# Patient Record
Sex: MALE | Race: BLACK OR AFRICAN AMERICAN | Employment: OTHER | URBAN - METROPOLITAN AREA
[De-identification: names, ages, dates, MRNs, and addresses within clinical notes are randomized per-mention and may not be internally consistent; named-entity substitution may affect disease eponyms.]

---

## 2017-01-16 ENCOUNTER — OFFICE VISIT (OUTPATIENT)
Dept: ORTHOPEDIC SURGERY | Age: 82
End: 2017-01-16

## 2017-01-16 VITALS
HEART RATE: 83 BPM | SYSTOLIC BLOOD PRESSURE: 138 MMHG | HEIGHT: 74 IN | WEIGHT: 174 LBS | BODY MASS INDEX: 22.33 KG/M2 | DIASTOLIC BLOOD PRESSURE: 73 MMHG

## 2017-01-16 DIAGNOSIS — M17.12 PRIMARY OSTEOARTHRITIS OF LEFT KNEE: ICD-10-CM

## 2017-01-16 DIAGNOSIS — S80.02XD CONTUSION OF LEFT KNEE, SUBSEQUENT ENCOUNTER: Primary | ICD-10-CM

## 2017-01-16 PROCEDURE — 20610 DRAIN/INJ JOINT/BURSA W/O US: CPT | Performed by: ORTHOPAEDIC SURGERY

## 2017-01-16 PROCEDURE — G8427 DOCREV CUR MEDS BY ELIG CLIN: HCPCS | Performed by: ORTHOPAEDIC SURGERY

## 2017-01-16 PROCEDURE — G8419 CALC BMI OUT NRM PARAM NOF/U: HCPCS | Performed by: ORTHOPAEDIC SURGERY

## 2017-01-16 PROCEDURE — G8484 FLU IMMUNIZE NO ADMIN: HCPCS | Performed by: ORTHOPAEDIC SURGERY

## 2017-01-16 PROCEDURE — 99213 OFFICE O/P EST LOW 20 MIN: CPT | Performed by: ORTHOPAEDIC SURGERY

## 2017-01-16 PROCEDURE — G8598 ASA/ANTIPLAT THER USED: HCPCS | Performed by: ORTHOPAEDIC SURGERY

## 2017-01-16 PROCEDURE — 1036F TOBACCO NON-USER: CPT | Performed by: ORTHOPAEDIC SURGERY

## 2017-01-16 PROCEDURE — 4040F PNEUMOC VAC/ADMIN/RCVD: CPT | Performed by: ORTHOPAEDIC SURGERY

## 2017-01-16 PROCEDURE — 1123F ACP DISCUSS/DSCN MKR DOCD: CPT | Performed by: ORTHOPAEDIC SURGERY

## 2017-01-16 RX ORDER — CYCLOBENZAPRINE HCL 10 MG
10 TABLET ORAL NIGHTLY PRN
Qty: 30 TABLET | Refills: 0 | Status: SHIPPED | OUTPATIENT
Start: 2017-01-16 | End: 2019-02-28

## 2017-02-08 ENCOUNTER — HOSPITAL ENCOUNTER (OUTPATIENT)
Dept: PHYSICAL THERAPY | Facility: MEDICAL CENTER | Age: 82
Discharge: HOME | End: 2017-02-28
Admitting: ORTHOPAEDIC SURGERY

## 2017-02-14 ENCOUNTER — OFFICE VISIT (OUTPATIENT)
Dept: INTERNAL MEDICINE CLINIC | Age: 82
End: 2017-02-14

## 2017-02-14 VITALS
HEART RATE: 74 BPM | SYSTOLIC BLOOD PRESSURE: 130 MMHG | OXYGEN SATURATION: 98 % | WEIGHT: 182 LBS | DIASTOLIC BLOOD PRESSURE: 82 MMHG | BODY MASS INDEX: 23.37 KG/M2

## 2017-02-14 DIAGNOSIS — H91.93 HEARING DEFICIT, BILATERAL: ICD-10-CM

## 2017-02-14 DIAGNOSIS — R68.89 FORGETFULNESS: ICD-10-CM

## 2017-02-14 DIAGNOSIS — E78.00 PURE HYPERCHOLESTEROLEMIA: ICD-10-CM

## 2017-02-14 DIAGNOSIS — I10 ESSENTIAL HYPERTENSION: ICD-10-CM

## 2017-02-14 PROCEDURE — G8427 DOCREV CUR MEDS BY ELIG CLIN: HCPCS | Performed by: INTERNAL MEDICINE

## 2017-02-14 PROCEDURE — G8598 ASA/ANTIPLAT THER USED: HCPCS | Performed by: INTERNAL MEDICINE

## 2017-02-14 PROCEDURE — 1036F TOBACCO NON-USER: CPT | Performed by: INTERNAL MEDICINE

## 2017-02-14 PROCEDURE — G8484 FLU IMMUNIZE NO ADMIN: HCPCS | Performed by: INTERNAL MEDICINE

## 2017-02-14 PROCEDURE — 4040F PNEUMOC VAC/ADMIN/RCVD: CPT | Performed by: INTERNAL MEDICINE

## 2017-02-14 PROCEDURE — 99214 OFFICE O/P EST MOD 30 MIN: CPT | Performed by: INTERNAL MEDICINE

## 2017-02-14 PROCEDURE — 1123F ACP DISCUSS/DSCN MKR DOCD: CPT | Performed by: INTERNAL MEDICINE

## 2017-02-14 PROCEDURE — G8420 CALC BMI NORM PARAMETERS: HCPCS | Performed by: INTERNAL MEDICINE

## 2017-02-14 RX ORDER — ATORVASTATIN CALCIUM 40 MG/1
40 TABLET, FILM COATED ORAL DAILY
Qty: 30 TABLET | Refills: 5 | Status: SHIPPED | OUTPATIENT
Start: 2017-02-14 | End: 2018-06-27 | Stop reason: SDUPTHER

## 2017-02-14 RX ORDER — METOPROLOL SUCCINATE 25 MG/1
25 TABLET, EXTENDED RELEASE ORAL DAILY
Qty: 30 TABLET | Refills: 5 | Status: SHIPPED | OUTPATIENT
Start: 2017-02-14 | End: 2018-02-06 | Stop reason: SDUPTHER

## 2017-02-14 RX ORDER — LISINOPRIL 10 MG/1
TABLET ORAL
Qty: 30 TABLET | Refills: 5 | Status: SHIPPED | OUTPATIENT
Start: 2017-02-14 | End: 2017-04-28 | Stop reason: SDUPTHER

## 2017-02-14 ASSESSMENT — PATIENT HEALTH QUESTIONNAIRE - PHQ9
1. LITTLE INTEREST OR PLEASURE IN DOING THINGS: 1
SUM OF ALL RESPONSES TO PHQ9 QUESTIONS 1 & 2: 1
SUM OF ALL RESPONSES TO PHQ QUESTIONS 1-9: 1
2. FEELING DOWN, DEPRESSED OR HOPELESS: 0

## 2017-02-19 ASSESSMENT — ENCOUNTER SYMPTOMS
EYES NEGATIVE: 1
GASTROINTESTINAL NEGATIVE: 1
RESPIRATORY NEGATIVE: 1

## 2017-04-07 ENCOUNTER — OFFICE VISIT (OUTPATIENT)
Dept: ORTHOPEDIC SURGERY | Age: 82
End: 2017-04-07

## 2017-04-07 VITALS
DIASTOLIC BLOOD PRESSURE: 90 MMHG | HEART RATE: 56 BPM | SYSTOLIC BLOOD PRESSURE: 175 MMHG | HEIGHT: 74 IN | WEIGHT: 174 LBS | RESPIRATION RATE: 16 BRPM | BODY MASS INDEX: 22.33 KG/M2

## 2017-04-07 DIAGNOSIS — M17.12 PRIMARY OSTEOARTHRITIS OF LEFT KNEE: Primary | ICD-10-CM

## 2017-04-07 DIAGNOSIS — M25.662 JOINT STIFFNESS OF LEFT LOWER LEG: ICD-10-CM

## 2017-04-07 PROCEDURE — 1036F TOBACCO NON-USER: CPT | Performed by: ORTHOPAEDIC SURGERY

## 2017-04-07 PROCEDURE — G8598 ASA/ANTIPLAT THER USED: HCPCS | Performed by: ORTHOPAEDIC SURGERY

## 2017-04-07 PROCEDURE — 99213 OFFICE O/P EST LOW 20 MIN: CPT | Performed by: ORTHOPAEDIC SURGERY

## 2017-04-07 PROCEDURE — G8419 CALC BMI OUT NRM PARAM NOF/U: HCPCS | Performed by: ORTHOPAEDIC SURGERY

## 2017-04-07 PROCEDURE — G8427 DOCREV CUR MEDS BY ELIG CLIN: HCPCS | Performed by: ORTHOPAEDIC SURGERY

## 2017-04-07 PROCEDURE — 4040F PNEUMOC VAC/ADMIN/RCVD: CPT | Performed by: ORTHOPAEDIC SURGERY

## 2017-04-07 PROCEDURE — 1123F ACP DISCUSS/DSCN MKR DOCD: CPT | Performed by: ORTHOPAEDIC SURGERY

## 2017-04-28 ENCOUNTER — OFFICE VISIT (OUTPATIENT)
Dept: ORTHOPEDIC SURGERY | Age: 82
End: 2017-04-28

## 2017-04-28 VITALS
HEIGHT: 74 IN | BODY MASS INDEX: 22.33 KG/M2 | DIASTOLIC BLOOD PRESSURE: 70 MMHG | HEART RATE: 70 BPM | RESPIRATION RATE: 16 BRPM | SYSTOLIC BLOOD PRESSURE: 154 MMHG | WEIGHT: 174 LBS

## 2017-04-28 DIAGNOSIS — I10 ESSENTIAL HYPERTENSION: ICD-10-CM

## 2017-04-28 DIAGNOSIS — M19.041 DEGENERATIVE ARTHRITIS OF FINGER, RIGHT: Primary | ICD-10-CM

## 2017-04-28 PROCEDURE — 99214 OFFICE O/P EST MOD 30 MIN: CPT | Performed by: ORTHOPAEDIC SURGERY

## 2017-04-28 PROCEDURE — 4040F PNEUMOC VAC/ADMIN/RCVD: CPT | Performed by: ORTHOPAEDIC SURGERY

## 2017-04-28 PROCEDURE — G8598 ASA/ANTIPLAT THER USED: HCPCS | Performed by: ORTHOPAEDIC SURGERY

## 2017-04-28 PROCEDURE — G8419 CALC BMI OUT NRM PARAM NOF/U: HCPCS | Performed by: ORTHOPAEDIC SURGERY

## 2017-04-28 PROCEDURE — 1036F TOBACCO NON-USER: CPT | Performed by: ORTHOPAEDIC SURGERY

## 2017-04-28 PROCEDURE — 20600 DRAIN/INJ JOINT/BURSA W/O US: CPT | Performed by: ORTHOPAEDIC SURGERY

## 2017-04-28 PROCEDURE — G8427 DOCREV CUR MEDS BY ELIG CLIN: HCPCS | Performed by: ORTHOPAEDIC SURGERY

## 2017-04-28 RX ORDER — LISINOPRIL 10 MG/1
TABLET ORAL
Qty: 90 TABLET | Refills: 3 | Status: SHIPPED | OUTPATIENT
Start: 2017-04-28 | End: 2018-06-27 | Stop reason: SDUPTHER

## 2017-05-15 ENCOUNTER — OFFICE VISIT (OUTPATIENT)
Dept: INTERNAL MEDICINE CLINIC | Age: 82
End: 2017-05-15

## 2017-05-15 VITALS
HEART RATE: 75 BPM | SYSTOLIC BLOOD PRESSURE: 110 MMHG | OXYGEN SATURATION: 98 % | BODY MASS INDEX: 22.21 KG/M2 | WEIGHT: 173 LBS | DIASTOLIC BLOOD PRESSURE: 70 MMHG

## 2017-05-15 DIAGNOSIS — Z23 NEED FOR PROPHYLACTIC VACCINATION AGAINST DIPHTHERIA-TETANUS-PERTUSSIS (DTP): ICD-10-CM

## 2017-05-15 DIAGNOSIS — R53.83 OTHER FATIGUE: ICD-10-CM

## 2017-05-15 DIAGNOSIS — I25.10 CAD IN NATIVE ARTERY: ICD-10-CM

## 2017-05-15 DIAGNOSIS — Z23 NEED FOR PROPHYLACTIC VACCINATION AGAINST STREPTOCOCCUS PNEUMONIAE (PNEUMOCOCCUS): ICD-10-CM

## 2017-05-15 DIAGNOSIS — E78.2 MIXED HYPERLIPIDEMIA: ICD-10-CM

## 2017-05-15 DIAGNOSIS — I10 ESSENTIAL HYPERTENSION: ICD-10-CM

## 2017-05-15 LAB
ANION GAP SERPL CALCULATED.3IONS-SCNC: 14 MMOL/L (ref 3–16)
BUN BLDV-MCNC: 11 MG/DL (ref 7–20)
CALCIUM SERPL-MCNC: 9.8 MG/DL (ref 8.3–10.6)
CHLORIDE BLD-SCNC: 102 MMOL/L (ref 99–110)
CHOLESTEROL, TOTAL: 160 MG/DL (ref 0–199)
CO2: 27 MMOL/L (ref 21–32)
CREAT SERPL-MCNC: 0.8 MG/DL (ref 0.8–1.3)
CREATININE URINE: 96.1 MG/DL (ref 39–259)
GFR AFRICAN AMERICAN: >60
GFR NON-AFRICAN AMERICAN: >60
GLUCOSE BLD-MCNC: 63 MG/DL (ref 70–99)
HDLC SERPL-MCNC: 64 MG/DL (ref 40–60)
LDL CHOLESTEROL CALCULATED: 85 MG/DL
MICROALBUMIN UR-MCNC: <1.2 MG/DL
MICROALBUMIN/CREAT UR-RTO: NORMAL MG/G (ref 0–30)
POTASSIUM SERPL-SCNC: 5.4 MMOL/L (ref 3.5–5.1)
SODIUM BLD-SCNC: 143 MMOL/L (ref 136–145)
TRIGL SERPL-MCNC: 54 MG/DL (ref 0–150)
TSH REFLEX: 0.89 UIU/ML (ref 0.27–4.2)
VLDLC SERPL CALC-MCNC: 11 MG/DL

## 2017-05-15 PROCEDURE — G8427 DOCREV CUR MEDS BY ELIG CLIN: HCPCS | Performed by: INTERNAL MEDICINE

## 2017-05-15 PROCEDURE — 99214 OFFICE O/P EST MOD 30 MIN: CPT | Performed by: INTERNAL MEDICINE

## 2017-05-15 PROCEDURE — 90670 PCV13 VACCINE IM: CPT | Performed by: INTERNAL MEDICINE

## 2017-05-15 PROCEDURE — G8419 CALC BMI OUT NRM PARAM NOF/U: HCPCS | Performed by: INTERNAL MEDICINE

## 2017-05-15 PROCEDURE — 1036F TOBACCO NON-USER: CPT | Performed by: INTERNAL MEDICINE

## 2017-05-15 PROCEDURE — G0009 ADMIN PNEUMOCOCCAL VACCINE: HCPCS | Performed by: INTERNAL MEDICINE

## 2017-05-15 PROCEDURE — G8598 ASA/ANTIPLAT THER USED: HCPCS | Performed by: INTERNAL MEDICINE

## 2017-05-15 PROCEDURE — 4040F PNEUMOC VAC/ADMIN/RCVD: CPT | Performed by: INTERNAL MEDICINE

## 2017-05-15 PROCEDURE — 1123F ACP DISCUSS/DSCN MKR DOCD: CPT | Performed by: INTERNAL MEDICINE

## 2017-05-15 RX ORDER — CLOTRIMAZOLE AND BETAMETHASONE DIPROPIONATE 10; .64 MG/G; MG/G
CREAM TOPICAL
Qty: 15 G | Refills: 0 | Status: SHIPPED | OUTPATIENT
Start: 2017-05-15 | End: 2017-08-15 | Stop reason: SDUPTHER

## 2017-05-21 ASSESSMENT — ENCOUNTER SYMPTOMS
GASTROINTESTINAL NEGATIVE: 1
EYES NEGATIVE: 1
RESPIRATORY NEGATIVE: 1

## 2017-07-07 ENCOUNTER — TELEPHONE (OUTPATIENT)
Dept: ORTHOPEDIC SURGERY | Age: 82
End: 2017-07-07

## 2017-07-07 ENCOUNTER — OFFICE VISIT (OUTPATIENT)
Dept: ORTHOPEDIC SURGERY | Age: 82
End: 2017-07-07

## 2017-07-07 VITALS
DIASTOLIC BLOOD PRESSURE: 80 MMHG | HEIGHT: 74 IN | SYSTOLIC BLOOD PRESSURE: 154 MMHG | HEART RATE: 75 BPM | WEIGHT: 173 LBS | BODY MASS INDEX: 22.2 KG/M2

## 2017-07-07 DIAGNOSIS — M17.12 PRIMARY OSTEOARTHRITIS OF LEFT KNEE: ICD-10-CM

## 2017-07-07 DIAGNOSIS — R29.898 LEFT LEG WEAKNESS: Primary | ICD-10-CM

## 2017-07-07 DIAGNOSIS — R26.9 GAIT ABNORMALITY: ICD-10-CM

## 2017-07-07 PROCEDURE — 99213 OFFICE O/P EST LOW 20 MIN: CPT | Performed by: ORTHOPAEDIC SURGERY

## 2017-07-07 PROCEDURE — G8598 ASA/ANTIPLAT THER USED: HCPCS | Performed by: ORTHOPAEDIC SURGERY

## 2017-07-07 PROCEDURE — 1036F TOBACCO NON-USER: CPT | Performed by: ORTHOPAEDIC SURGERY

## 2017-07-07 PROCEDURE — 1123F ACP DISCUSS/DSCN MKR DOCD: CPT | Performed by: ORTHOPAEDIC SURGERY

## 2017-07-07 PROCEDURE — G8420 CALC BMI NORM PARAMETERS: HCPCS | Performed by: ORTHOPAEDIC SURGERY

## 2017-07-07 PROCEDURE — 4040F PNEUMOC VAC/ADMIN/RCVD: CPT | Performed by: ORTHOPAEDIC SURGERY

## 2017-07-07 PROCEDURE — G8427 DOCREV CUR MEDS BY ELIG CLIN: HCPCS | Performed by: ORTHOPAEDIC SURGERY

## 2017-08-15 ENCOUNTER — OFFICE VISIT (OUTPATIENT)
Dept: INTERNAL MEDICINE CLINIC | Age: 82
End: 2017-08-15

## 2017-08-15 VITALS
SYSTOLIC BLOOD PRESSURE: 126 MMHG | WEIGHT: 170 LBS | BODY MASS INDEX: 21.82 KG/M2 | HEART RATE: 74 BPM | OXYGEN SATURATION: 98 % | HEIGHT: 74 IN | DIASTOLIC BLOOD PRESSURE: 80 MMHG

## 2017-08-15 DIAGNOSIS — E78.2 MIXED HYPERLIPIDEMIA: ICD-10-CM

## 2017-08-15 DIAGNOSIS — R53.83 FATIGUE, UNSPECIFIED TYPE: ICD-10-CM

## 2017-08-15 DIAGNOSIS — I25.10 CAD IN NATIVE ARTERY: ICD-10-CM

## 2017-08-15 DIAGNOSIS — I10 ESSENTIAL HYPERTENSION: ICD-10-CM

## 2017-08-15 PROCEDURE — G8427 DOCREV CUR MEDS BY ELIG CLIN: HCPCS | Performed by: INTERNAL MEDICINE

## 2017-08-15 PROCEDURE — 1036F TOBACCO NON-USER: CPT | Performed by: INTERNAL MEDICINE

## 2017-08-15 PROCEDURE — 1123F ACP DISCUSS/DSCN MKR DOCD: CPT | Performed by: INTERNAL MEDICINE

## 2017-08-15 PROCEDURE — 4040F PNEUMOC VAC/ADMIN/RCVD: CPT | Performed by: INTERNAL MEDICINE

## 2017-08-15 PROCEDURE — 99214 OFFICE O/P EST MOD 30 MIN: CPT | Performed by: INTERNAL MEDICINE

## 2017-08-15 PROCEDURE — G8598 ASA/ANTIPLAT THER USED: HCPCS | Performed by: INTERNAL MEDICINE

## 2017-08-15 PROCEDURE — G8420 CALC BMI NORM PARAMETERS: HCPCS | Performed by: INTERNAL MEDICINE

## 2017-08-15 RX ORDER — CLOTRIMAZOLE AND BETAMETHASONE DIPROPIONATE 10; .64 MG/G; MG/G
CREAM TOPICAL
Qty: 15 G | Refills: 0 | Status: SHIPPED | OUTPATIENT
Start: 2017-08-15 | End: 2019-02-28

## 2017-08-20 ASSESSMENT — ENCOUNTER SYMPTOMS
RESPIRATORY NEGATIVE: 1
EYES NEGATIVE: 1
GASTROINTESTINAL NEGATIVE: 1

## 2017-08-28 ENCOUNTER — TELEPHONE (OUTPATIENT)
Dept: ORTHOPEDIC SURGERY | Age: 82
End: 2017-08-28

## 2017-08-28 ENCOUNTER — OFFICE VISIT (OUTPATIENT)
Dept: ORTHOPEDIC SURGERY | Age: 82
End: 2017-08-28

## 2017-08-28 VITALS
BODY MASS INDEX: 21.82 KG/M2 | WEIGHT: 170 LBS | HEIGHT: 74 IN | HEART RATE: 72 BPM | DIASTOLIC BLOOD PRESSURE: 79 MMHG | SYSTOLIC BLOOD PRESSURE: 171 MMHG

## 2017-08-28 DIAGNOSIS — M48.061 LUMBAR STENOSIS: Primary | ICD-10-CM

## 2017-08-28 DIAGNOSIS — R26.81 GAIT INSTABILITY: ICD-10-CM

## 2017-08-28 DIAGNOSIS — R29.898 WEAKNESS OF LOWER EXTREMITY, UNSPECIFIED LATERALITY: ICD-10-CM

## 2017-08-28 PROCEDURE — G8420 CALC BMI NORM PARAMETERS: HCPCS | Performed by: ORTHOPAEDIC SURGERY

## 2017-08-28 PROCEDURE — 4040F PNEUMOC VAC/ADMIN/RCVD: CPT | Performed by: ORTHOPAEDIC SURGERY

## 2017-08-28 PROCEDURE — G8598 ASA/ANTIPLAT THER USED: HCPCS | Performed by: ORTHOPAEDIC SURGERY

## 2017-08-28 PROCEDURE — 99213 OFFICE O/P EST LOW 20 MIN: CPT | Performed by: ORTHOPAEDIC SURGERY

## 2017-08-28 PROCEDURE — 1036F TOBACCO NON-USER: CPT | Performed by: ORTHOPAEDIC SURGERY

## 2017-08-28 PROCEDURE — 1123F ACP DISCUSS/DSCN MKR DOCD: CPT | Performed by: ORTHOPAEDIC SURGERY

## 2017-08-28 PROCEDURE — G8427 DOCREV CUR MEDS BY ELIG CLIN: HCPCS | Performed by: ORTHOPAEDIC SURGERY

## 2017-09-21 ENCOUNTER — OFFICE VISIT (OUTPATIENT)
Dept: CARDIOLOGY CLINIC | Age: 82
End: 2017-09-21

## 2017-09-21 VITALS
BODY MASS INDEX: 22.34 KG/M2 | SYSTOLIC BLOOD PRESSURE: 132 MMHG | HEART RATE: 63 BPM | WEIGHT: 174 LBS | DIASTOLIC BLOOD PRESSURE: 76 MMHG

## 2017-09-21 DIAGNOSIS — I25.119 CORONARY ARTERY DISEASE INVOLVING NATIVE CORONARY ARTERY OF NATIVE HEART WITH ANGINA PECTORIS (HCC): Primary | ICD-10-CM

## 2017-09-21 DIAGNOSIS — I10 ESSENTIAL HYPERTENSION: ICD-10-CM

## 2017-09-21 PROCEDURE — 99214 OFFICE O/P EST MOD 30 MIN: CPT | Performed by: INTERNAL MEDICINE

## 2017-09-21 PROCEDURE — G8420 CALC BMI NORM PARAMETERS: HCPCS | Performed by: INTERNAL MEDICINE

## 2017-09-21 PROCEDURE — 1036F TOBACCO NON-USER: CPT | Performed by: INTERNAL MEDICINE

## 2017-09-21 PROCEDURE — G8427 DOCREV CUR MEDS BY ELIG CLIN: HCPCS | Performed by: INTERNAL MEDICINE

## 2017-09-21 PROCEDURE — 1123F ACP DISCUSS/DSCN MKR DOCD: CPT | Performed by: INTERNAL MEDICINE

## 2017-09-21 PROCEDURE — G8598 ASA/ANTIPLAT THER USED: HCPCS | Performed by: INTERNAL MEDICINE

## 2017-09-21 PROCEDURE — 4040F PNEUMOC VAC/ADMIN/RCVD: CPT | Performed by: INTERNAL MEDICINE

## 2017-10-12 ENCOUNTER — TELEPHONE (OUTPATIENT)
Dept: ORTHOPEDIC SURGERY | Age: 82
End: 2017-10-12

## 2017-11-30 ENCOUNTER — OFFICE VISIT (OUTPATIENT)
Dept: INTERNAL MEDICINE CLINIC | Age: 82
End: 2017-11-30

## 2017-11-30 VITALS
HEART RATE: 66 BPM | WEIGHT: 174 LBS | OXYGEN SATURATION: 97 % | SYSTOLIC BLOOD PRESSURE: 138 MMHG | RESPIRATION RATE: 16 BRPM | DIASTOLIC BLOOD PRESSURE: 88 MMHG | HEIGHT: 74 IN | BODY MASS INDEX: 22.33 KG/M2

## 2017-11-30 DIAGNOSIS — I10 ESSENTIAL HYPERTENSION: ICD-10-CM

## 2017-11-30 DIAGNOSIS — E78.2 MIXED HYPERLIPIDEMIA: ICD-10-CM

## 2017-11-30 DIAGNOSIS — Z23 FLU VACCINE NEED: ICD-10-CM

## 2017-11-30 DIAGNOSIS — N52.01 ERECTILE DYSFUNCTION DUE TO ARTERIAL INSUFFICIENCY: ICD-10-CM

## 2017-11-30 PROCEDURE — G8427 DOCREV CUR MEDS BY ELIG CLIN: HCPCS | Performed by: INTERNAL MEDICINE

## 2017-11-30 PROCEDURE — 4040F PNEUMOC VAC/ADMIN/RCVD: CPT | Performed by: INTERNAL MEDICINE

## 2017-11-30 PROCEDURE — G8420 CALC BMI NORM PARAMETERS: HCPCS | Performed by: INTERNAL MEDICINE

## 2017-11-30 PROCEDURE — 1036F TOBACCO NON-USER: CPT | Performed by: INTERNAL MEDICINE

## 2017-11-30 PROCEDURE — 99214 OFFICE O/P EST MOD 30 MIN: CPT | Performed by: INTERNAL MEDICINE

## 2017-11-30 PROCEDURE — G8598 ASA/ANTIPLAT THER USED: HCPCS | Performed by: INTERNAL MEDICINE

## 2017-11-30 PROCEDURE — 90662 IIV NO PRSV INCREASED AG IM: CPT | Performed by: INTERNAL MEDICINE

## 2017-11-30 PROCEDURE — 1123F ACP DISCUSS/DSCN MKR DOCD: CPT | Performed by: INTERNAL MEDICINE

## 2017-11-30 PROCEDURE — G0008 ADMIN INFLUENZA VIRUS VAC: HCPCS | Performed by: INTERNAL MEDICINE

## 2017-11-30 PROCEDURE — G8484 FLU IMMUNIZE NO ADMIN: HCPCS | Performed by: INTERNAL MEDICINE

## 2017-11-30 RX ORDER — METOPROLOL SUCCINATE 25 MG/1
TABLET, EXTENDED RELEASE ORAL
Refills: 5 | COMMUNITY
Start: 2017-10-30 | End: 2018-04-30 | Stop reason: ALTCHOICE

## 2017-11-30 RX ORDER — SILDENAFIL 100 MG/1
100 TABLET, FILM COATED ORAL PRN
Qty: 6 TABLET | Refills: 5 | Status: SHIPPED | OUTPATIENT
Start: 2017-11-30 | End: 2018-07-11 | Stop reason: SDUPTHER

## 2017-12-01 ASSESSMENT — ENCOUNTER SYMPTOMS
RESPIRATORY NEGATIVE: 1
EYES NEGATIVE: 1
GASTROINTESTINAL NEGATIVE: 1

## 2017-12-01 NOTE — PROGRESS NOTES
Heart healthy diet and statin compliance discussed. 3. Erectile dysfunction due to arterial insufficiency  sildenafil (VIAGRA) 100 MG tablet  Diet, proper rest, regular physical activity discussed. 4. Flu vaccine need  INFLUENZA, HIGH DOSE, 65 YRS +, IM, PF, PREFILL SYR, 0.5ML (FLUZONE HD)     Ryder Bailey received counseling on the following healthy behaviors: diet, physical activity and proper rest.    Patient given educational materials on Nutrition and Exercise    I have instructed Ryder Bailey to complete a self tracking handout on Blood Pressures  and instructed them to bring it with them to his next appointment. Discussed use, benefit, and side effects of prescribed medications. Barriers to medication compliance addressed. All patient questions answered. Pt voiced understanding. Plan:    See plans above.

## 2018-04-24 ENCOUNTER — OFFICE VISIT (OUTPATIENT)
Dept: INTERNAL MEDICINE CLINIC | Age: 83
End: 2018-04-24

## 2018-04-24 VITALS
DIASTOLIC BLOOD PRESSURE: 70 MMHG | BODY MASS INDEX: 22.97 KG/M2 | WEIGHT: 179 LBS | HEART RATE: 82 BPM | OXYGEN SATURATION: 98 % | HEIGHT: 74 IN | SYSTOLIC BLOOD PRESSURE: 126 MMHG

## 2018-04-24 DIAGNOSIS — E78.2 MIXED HYPERLIPIDEMIA: ICD-10-CM

## 2018-04-24 DIAGNOSIS — S89.92XD INJURY OF LEFT KNEE, SUBSEQUENT ENCOUNTER: ICD-10-CM

## 2018-04-24 DIAGNOSIS — G31.84 MCI (MILD COGNITIVE IMPAIRMENT) WITH MEMORY LOSS: ICD-10-CM

## 2018-04-24 DIAGNOSIS — I10 ESSENTIAL HYPERTENSION: Primary | ICD-10-CM

## 2018-04-24 LAB
A/G RATIO: 1.4 (ref 1.1–2.2)
ALBUMIN SERPL-MCNC: 4.4 G/DL (ref 3.4–5)
ALP BLD-CCNC: 68 U/L (ref 40–129)
ALT SERPL-CCNC: 16 U/L (ref 10–40)
ANION GAP SERPL CALCULATED.3IONS-SCNC: 14 MMOL/L (ref 3–16)
AST SERPL-CCNC: 20 U/L (ref 15–37)
BILIRUB SERPL-MCNC: 0.3 MG/DL (ref 0–1)
BUN BLDV-MCNC: 14 MG/DL (ref 7–20)
CALCIUM SERPL-MCNC: 9.7 MG/DL (ref 8.3–10.6)
CHLORIDE BLD-SCNC: 101 MMOL/L (ref 99–110)
CHOLESTEROL, TOTAL: 175 MG/DL (ref 0–199)
CO2: 29 MMOL/L (ref 21–32)
CREAT SERPL-MCNC: 0.7 MG/DL (ref 0.8–1.3)
FOLATE: >20 NG/ML (ref 4.78–24.2)
GFR AFRICAN AMERICAN: >60
GFR NON-AFRICAN AMERICAN: >60
GLOBULIN: 3.1 G/DL
GLUCOSE BLD-MCNC: 74 MG/DL (ref 70–99)
HCT VFR BLD CALC: 43.9 % (ref 40.5–52.5)
HDLC SERPL-MCNC: 64 MG/DL (ref 40–60)
HEMOGLOBIN: 14.5 G/DL (ref 13.5–17.5)
LDL CHOLESTEROL CALCULATED: 99 MG/DL
MCH RBC QN AUTO: 28.8 PG (ref 26–34)
MCHC RBC AUTO-ENTMCNC: 33 G/DL (ref 31–36)
MCV RBC AUTO: 87.3 FL (ref 80–100)
PDW BLD-RTO: 13.1 % (ref 12.4–15.4)
PLATELET # BLD: 195 K/UL (ref 135–450)
PMV BLD AUTO: 9.8 FL (ref 5–10.5)
POTASSIUM SERPL-SCNC: 4.7 MMOL/L (ref 3.5–5.1)
RBC # BLD: 5.03 M/UL (ref 4.2–5.9)
SODIUM BLD-SCNC: 144 MMOL/L (ref 136–145)
TOTAL PROTEIN: 7.5 G/DL (ref 6.4–8.2)
TRIGL SERPL-MCNC: 58 MG/DL (ref 0–150)
TSH REFLEX: 0.84 UIU/ML (ref 0.27–4.2)
VITAMIN B-12: 552 PG/ML (ref 211–911)
VLDLC SERPL CALC-MCNC: 12 MG/DL
WBC # BLD: 4.6 K/UL (ref 4–11)

## 2018-04-24 PROCEDURE — G8427 DOCREV CUR MEDS BY ELIG CLIN: HCPCS | Performed by: INTERNAL MEDICINE

## 2018-04-24 PROCEDURE — 99214 OFFICE O/P EST MOD 30 MIN: CPT | Performed by: INTERNAL MEDICINE

## 2018-04-24 PROCEDURE — G8420 CALC BMI NORM PARAMETERS: HCPCS | Performed by: INTERNAL MEDICINE

## 2018-04-24 PROCEDURE — 4040F PNEUMOC VAC/ADMIN/RCVD: CPT | Performed by: INTERNAL MEDICINE

## 2018-04-24 PROCEDURE — 1123F ACP DISCUSS/DSCN MKR DOCD: CPT | Performed by: INTERNAL MEDICINE

## 2018-04-24 PROCEDURE — G8598 ASA/ANTIPLAT THER USED: HCPCS | Performed by: INTERNAL MEDICINE

## 2018-04-24 PROCEDURE — 1036F TOBACCO NON-USER: CPT | Performed by: INTERNAL MEDICINE

## 2018-04-24 ASSESSMENT — PATIENT HEALTH QUESTIONNAIRE - PHQ9
SUM OF ALL RESPONSES TO PHQ9 QUESTIONS 1 & 2: 0
2. FEELING DOWN, DEPRESSED OR HOPELESS: 0
SUM OF ALL RESPONSES TO PHQ QUESTIONS 1-9: 0
1. LITTLE INTEREST OR PLEASURE IN DOING THINGS: 0

## 2018-04-25 LAB — RPR: NORMAL

## 2018-04-26 ENCOUNTER — OFFICE VISIT (OUTPATIENT)
Dept: CARDIOLOGY CLINIC | Age: 83
End: 2018-04-26

## 2018-04-26 VITALS
SYSTOLIC BLOOD PRESSURE: 124 MMHG | BODY MASS INDEX: 22.98 KG/M2 | WEIGHT: 179 LBS | HEART RATE: 68 BPM | DIASTOLIC BLOOD PRESSURE: 64 MMHG

## 2018-04-26 DIAGNOSIS — I25.119 CORONARY ARTERY DISEASE INVOLVING NATIVE CORONARY ARTERY OF NATIVE HEART WITH ANGINA PECTORIS (HCC): Primary | ICD-10-CM

## 2018-04-26 DIAGNOSIS — I10 ESSENTIAL HYPERTENSION: ICD-10-CM

## 2018-04-26 PROCEDURE — G8427 DOCREV CUR MEDS BY ELIG CLIN: HCPCS | Performed by: INTERNAL MEDICINE

## 2018-04-26 PROCEDURE — 1036F TOBACCO NON-USER: CPT | Performed by: INTERNAL MEDICINE

## 2018-04-26 PROCEDURE — G8598 ASA/ANTIPLAT THER USED: HCPCS | Performed by: INTERNAL MEDICINE

## 2018-04-26 PROCEDURE — 99214 OFFICE O/P EST MOD 30 MIN: CPT | Performed by: INTERNAL MEDICINE

## 2018-04-26 PROCEDURE — 4040F PNEUMOC VAC/ADMIN/RCVD: CPT | Performed by: INTERNAL MEDICINE

## 2018-04-26 PROCEDURE — 1123F ACP DISCUSS/DSCN MKR DOCD: CPT | Performed by: INTERNAL MEDICINE

## 2018-04-26 PROCEDURE — G8420 CALC BMI NORM PARAMETERS: HCPCS | Performed by: INTERNAL MEDICINE

## 2018-04-30 ENCOUNTER — PRE-EVALUATION (OUTPATIENT)
Dept: ORTHOPEDIC SURGERY | Age: 83
End: 2018-04-30

## 2018-04-30 ENCOUNTER — TELEPHONE (OUTPATIENT)
Dept: ORTHOPEDIC SURGERY | Age: 83
End: 2018-04-30

## 2018-04-30 ENCOUNTER — OFFICE VISIT (OUTPATIENT)
Dept: ORTHOPEDIC SURGERY | Age: 83
End: 2018-04-30

## 2018-04-30 VITALS
HEART RATE: 60 BPM | WEIGHT: 181 LBS | BODY MASS INDEX: 23.23 KG/M2 | SYSTOLIC BLOOD PRESSURE: 171 MMHG | HEIGHT: 74 IN | DIASTOLIC BLOOD PRESSURE: 72 MMHG

## 2018-04-30 DIAGNOSIS — M48.00 SPINAL STENOSIS, UNSPECIFIED SPINAL REGION: Primary | ICD-10-CM

## 2018-04-30 DIAGNOSIS — M48.061 SPINAL STENOSIS OF LUMBAR REGION, UNSPECIFIED WHETHER NEUROGENIC CLAUDICATION PRESENT: ICD-10-CM

## 2018-04-30 DIAGNOSIS — R26.81 GAIT INSTABILITY: ICD-10-CM

## 2018-04-30 DIAGNOSIS — M62.89 MUSCLE STIFFNESS: ICD-10-CM

## 2018-04-30 PROCEDURE — 4040F PNEUMOC VAC/ADMIN/RCVD: CPT | Performed by: ORTHOPAEDIC SURGERY

## 2018-04-30 PROCEDURE — APPSS15 APP SPLIT SHARED TIME 0-15 MINUTES: Performed by: NURSE PRACTITIONER

## 2018-04-30 PROCEDURE — G8427 DOCREV CUR MEDS BY ELIG CLIN: HCPCS | Performed by: ORTHOPAEDIC SURGERY

## 2018-04-30 PROCEDURE — 99213 OFFICE O/P EST LOW 20 MIN: CPT | Performed by: ORTHOPAEDIC SURGERY

## 2018-04-30 PROCEDURE — 1036F TOBACCO NON-USER: CPT | Performed by: ORTHOPAEDIC SURGERY

## 2018-04-30 PROCEDURE — G8420 CALC BMI NORM PARAMETERS: HCPCS | Performed by: ORTHOPAEDIC SURGERY

## 2018-04-30 PROCEDURE — 1123F ACP DISCUSS/DSCN MKR DOCD: CPT | Performed by: ORTHOPAEDIC SURGERY

## 2018-04-30 PROCEDURE — G8598 ASA/ANTIPLAT THER USED: HCPCS | Performed by: ORTHOPAEDIC SURGERY

## 2018-05-07 ASSESSMENT — ENCOUNTER SYMPTOMS
GASTROINTESTINAL NEGATIVE: 1
EYES NEGATIVE: 1
RESPIRATORY NEGATIVE: 1

## 2018-05-10 ENCOUNTER — HOSPITAL ENCOUNTER (OUTPATIENT)
Dept: MRI IMAGING | Age: 83
Discharge: HOME | End: 2018-05-10
Attending: ORTHOPAEDIC SURGERY | Admitting: ORTHOPAEDIC SURGERY

## 2018-05-10 ENCOUNTER — HOSPITAL ENCOUNTER (OUTPATIENT)
Dept: CT IMAGING | Age: 83
Discharge: HOME | End: 2018-05-10
Attending: INTERNAL MEDICINE | Admitting: INTERNAL MEDICINE

## 2018-05-10 DIAGNOSIS — M48.00 SPINAL STENOSIS, UNSPECIFIED SPINAL REGION: ICD-10-CM

## 2018-05-10 DIAGNOSIS — I62.00 NONTRAUMATIC SUBDURAL HEMORRHAGE (HCC): ICD-10-CM

## 2018-05-10 DIAGNOSIS — G31.84 MCI (MILD COGNITIVE IMPAIRMENT) WITH MEMORY LOSS: ICD-10-CM

## 2018-05-22 ENCOUNTER — TELEPHONE (OUTPATIENT)
Dept: INTERNAL MEDICINE CLINIC | Age: 83
End: 2018-05-22

## 2018-07-11 ENCOUNTER — OFFICE VISIT (OUTPATIENT)
Dept: INTERNAL MEDICINE CLINIC | Age: 83
End: 2018-07-11

## 2018-07-11 VITALS
DIASTOLIC BLOOD PRESSURE: 64 MMHG | HEIGHT: 73 IN | BODY MASS INDEX: 22.93 KG/M2 | WEIGHT: 173 LBS | SYSTOLIC BLOOD PRESSURE: 126 MMHG | HEART RATE: 70 BPM | OXYGEN SATURATION: 99 %

## 2018-07-11 DIAGNOSIS — I10 ESSENTIAL HYPERTENSION: ICD-10-CM

## 2018-07-11 DIAGNOSIS — G31.84 MCI (MILD COGNITIVE IMPAIRMENT) WITH MEMORY LOSS: Primary | ICD-10-CM

## 2018-07-11 DIAGNOSIS — E78.00 PURE HYPERCHOLESTEROLEMIA: ICD-10-CM

## 2018-07-11 DIAGNOSIS — N52.01 ERECTILE DYSFUNCTION DUE TO ARTERIAL INSUFFICIENCY: ICD-10-CM

## 2018-07-11 PROCEDURE — 1123F ACP DISCUSS/DSCN MKR DOCD: CPT | Performed by: INTERNAL MEDICINE

## 2018-07-11 PROCEDURE — 4040F PNEUMOC VAC/ADMIN/RCVD: CPT | Performed by: INTERNAL MEDICINE

## 2018-07-11 PROCEDURE — G8598 ASA/ANTIPLAT THER USED: HCPCS | Performed by: INTERNAL MEDICINE

## 2018-07-11 PROCEDURE — 1101F PT FALLS ASSESS-DOCD LE1/YR: CPT | Performed by: INTERNAL MEDICINE

## 2018-07-11 PROCEDURE — 99214 OFFICE O/P EST MOD 30 MIN: CPT | Performed by: INTERNAL MEDICINE

## 2018-07-11 PROCEDURE — G8427 DOCREV CUR MEDS BY ELIG CLIN: HCPCS | Performed by: INTERNAL MEDICINE

## 2018-07-11 PROCEDURE — G8420 CALC BMI NORM PARAMETERS: HCPCS | Performed by: INTERNAL MEDICINE

## 2018-07-11 PROCEDURE — 1036F TOBACCO NON-USER: CPT | Performed by: INTERNAL MEDICINE

## 2018-07-11 RX ORDER — SILDENAFIL 100 MG/1
100 TABLET, FILM COATED ORAL PRN
Qty: 6 TABLET | Refills: 5 | Status: SHIPPED | OUTPATIENT
Start: 2018-07-11 | End: 2019-02-28 | Stop reason: SDUPTHER

## 2018-07-11 RX ORDER — METOPROLOL SUCCINATE 25 MG/1
25 TABLET, EXTENDED RELEASE ORAL DAILY
Qty: 30 TABLET | Refills: 5 | Status: SHIPPED | OUTPATIENT
Start: 2018-07-11 | End: 2018-07-11 | Stop reason: SDUPTHER

## 2018-07-31 ASSESSMENT — ENCOUNTER SYMPTOMS
EYES NEGATIVE: 1
RESPIRATORY NEGATIVE: 1
GASTROINTESTINAL NEGATIVE: 1

## 2018-11-28 ENCOUNTER — OFFICE VISIT (OUTPATIENT)
Dept: INTERNAL MEDICINE CLINIC | Age: 83
End: 2018-11-28
Payer: MEDICARE

## 2018-11-28 VITALS
HEIGHT: 73 IN | DIASTOLIC BLOOD PRESSURE: 60 MMHG | OXYGEN SATURATION: 95 % | BODY MASS INDEX: 23.22 KG/M2 | WEIGHT: 175.2 LBS | HEART RATE: 63 BPM | SYSTOLIC BLOOD PRESSURE: 128 MMHG

## 2018-11-28 DIAGNOSIS — I10 ESSENTIAL HYPERTENSION: Primary | ICD-10-CM

## 2018-11-28 DIAGNOSIS — E78.2 MIXED HYPERLIPIDEMIA: ICD-10-CM

## 2018-11-28 DIAGNOSIS — I25.10 CORONARY ARTERY DISEASE INVOLVING NATIVE CORONARY ARTERY OF NATIVE HEART WITHOUT ANGINA PECTORIS: ICD-10-CM

## 2018-11-28 PROCEDURE — G8420 CALC BMI NORM PARAMETERS: HCPCS | Performed by: INTERNAL MEDICINE

## 2018-11-28 PROCEDURE — 1101F PT FALLS ASSESS-DOCD LE1/YR: CPT | Performed by: INTERNAL MEDICINE

## 2018-11-28 PROCEDURE — 1123F ACP DISCUSS/DSCN MKR DOCD: CPT | Performed by: INTERNAL MEDICINE

## 2018-11-28 PROCEDURE — 99214 OFFICE O/P EST MOD 30 MIN: CPT | Performed by: INTERNAL MEDICINE

## 2018-11-28 PROCEDURE — G8598 ASA/ANTIPLAT THER USED: HCPCS | Performed by: INTERNAL MEDICINE

## 2018-11-28 PROCEDURE — G8484 FLU IMMUNIZE NO ADMIN: HCPCS | Performed by: INTERNAL MEDICINE

## 2018-11-28 PROCEDURE — G8427 DOCREV CUR MEDS BY ELIG CLIN: HCPCS | Performed by: INTERNAL MEDICINE

## 2018-11-28 PROCEDURE — 4040F PNEUMOC VAC/ADMIN/RCVD: CPT | Performed by: INTERNAL MEDICINE

## 2018-11-28 PROCEDURE — 1036F TOBACCO NON-USER: CPT | Performed by: INTERNAL MEDICINE

## 2018-11-28 ASSESSMENT — PATIENT HEALTH QUESTIONNAIRE - PHQ9
1. LITTLE INTEREST OR PLEASURE IN DOING THINGS: 0
SUM OF ALL RESPONSES TO PHQ QUESTIONS 1-9: 0
2. FEELING DOWN, DEPRESSED OR HOPELESS: 0
SUM OF ALL RESPONSES TO PHQ QUESTIONS 1-9: 0
SUM OF ALL RESPONSES TO PHQ9 QUESTIONS 1 & 2: 0

## 2018-11-28 NOTE — PROGRESS NOTES
Subjective:      Patient ID: Ardine Paget is a 82 y.o. male. HPI He is here for a check up. Seen today for hypertension, hyperlipidemia. He is taking medication as prescribed, eats fairly balanced meals and is physically active. Has a [employer] business but has slowed down with the physical activity aspect of his [place]. Review of Systems   Constitutional: Negative. HENT: Negative. Eyes: Negative. Respiratory: Negative. Cardiovascular:        HTN, on stable med regimen. See list.    CAD with H/O CABG. Stable. Denies CP or SOB. Gastrointestinal: Negative. Endocrine:        Hyperlipidemia, treated with statin. LDL 99. Genitourinary: Negative. Musculoskeletal: Negative. Skin: Negative. Neurological: Negative. Psychiatric/Behavioral: Negative. Objective:   Physical Exam   Constitutional: He is oriented to person, place, and time. He appears well-developed and well-nourished. No distress. HENT:   Head: Normocephalic and atraumatic. Right Ear: External ear normal.   Left Ear: External ear normal.   Nose: Nose normal.   Mouth/Throat: Oropharynx is clear and moist.   Eyes: Pupils are equal, round, and reactive to light. Conjunctivae and EOM are normal. No scleral icterus. Neck: Normal range of motion. Neck supple. No thyromegaly present. Cardiovascular: Normal rate, regular rhythm, normal heart sounds and intact distal pulses. Pulmonary/Chest: Effort normal and breath sounds normal.   Abdominal: Soft. Bowel sounds are normal. He exhibits no mass. Lymphadenopathy:     He has no cervical adenopathy. Neurological: He is alert and oriented to person, place, and time. He has normal reflexes. Skin: Skin is warm and dry. Psychiatric: He has a normal mood and affect. His behavior is normal. Judgment and thought content normal.       Assessment:        Diagnosis Orders   1. Essential hypertension  Stable. Continue same med regimen. DASH diet discussed.    2. Mixed hyperlipidemia  Heart healthy diet and statin compliance discussed. 3. Coronary artery disease involving native coronary artery of native heart without angina pectoris  Stable. Heart healthy lifestyle discussed. Plan:    See plans above.         Viktor Sood MD

## 2018-12-11 ASSESSMENT — ENCOUNTER SYMPTOMS
EYES NEGATIVE: 1
GASTROINTESTINAL NEGATIVE: 1
RESPIRATORY NEGATIVE: 1

## 2019-02-28 ENCOUNTER — OFFICE VISIT (OUTPATIENT)
Dept: INTERNAL MEDICINE CLINIC | Age: 84
End: 2019-02-28
Payer: MEDICARE

## 2019-02-28 VITALS
SYSTOLIC BLOOD PRESSURE: 122 MMHG | DIASTOLIC BLOOD PRESSURE: 62 MMHG | HEIGHT: 72 IN | WEIGHT: 177 LBS | HEART RATE: 76 BPM | BODY MASS INDEX: 23.98 KG/M2 | OXYGEN SATURATION: 99 %

## 2019-02-28 DIAGNOSIS — I25.10 CORONARY ARTERY DISEASE INVOLVING NATIVE CORONARY ARTERY OF NATIVE HEART WITHOUT ANGINA PECTORIS: ICD-10-CM

## 2019-02-28 DIAGNOSIS — C61 PROSTATE CANCER (HCC): ICD-10-CM

## 2019-02-28 DIAGNOSIS — E78.2 MIXED HYPERLIPIDEMIA: ICD-10-CM

## 2019-02-28 DIAGNOSIS — M54.89 OTHER BACK PAIN, UNSPECIFIED CHRONICITY: ICD-10-CM

## 2019-02-28 DIAGNOSIS — N52.01 ERECTILE DYSFUNCTION DUE TO ARTERIAL INSUFFICIENCY: ICD-10-CM

## 2019-02-28 DIAGNOSIS — I10 ESSENTIAL HYPERTENSION: Primary | ICD-10-CM

## 2019-02-28 PROCEDURE — G8598 ASA/ANTIPLAT THER USED: HCPCS | Performed by: INTERNAL MEDICINE

## 2019-02-28 PROCEDURE — G8427 DOCREV CUR MEDS BY ELIG CLIN: HCPCS | Performed by: INTERNAL MEDICINE

## 2019-02-28 PROCEDURE — G8484 FLU IMMUNIZE NO ADMIN: HCPCS | Performed by: INTERNAL MEDICINE

## 2019-02-28 PROCEDURE — 1036F TOBACCO NON-USER: CPT | Performed by: INTERNAL MEDICINE

## 2019-02-28 PROCEDURE — 4040F PNEUMOC VAC/ADMIN/RCVD: CPT | Performed by: INTERNAL MEDICINE

## 2019-02-28 PROCEDURE — 99214 OFFICE O/P EST MOD 30 MIN: CPT | Performed by: INTERNAL MEDICINE

## 2019-02-28 PROCEDURE — 1101F PT FALLS ASSESS-DOCD LE1/YR: CPT | Performed by: INTERNAL MEDICINE

## 2019-02-28 PROCEDURE — 1123F ACP DISCUSS/DSCN MKR DOCD: CPT | Performed by: INTERNAL MEDICINE

## 2019-02-28 PROCEDURE — G8420 CALC BMI NORM PARAMETERS: HCPCS | Performed by: INTERNAL MEDICINE

## 2019-02-28 RX ORDER — SILDENAFIL 100 MG/1
100 TABLET, FILM COATED ORAL PRN
Qty: 6 TABLET | Refills: 5 | Status: SHIPPED | OUTPATIENT
Start: 2019-02-28 | End: 2022-10-26 | Stop reason: ALTCHOICE

## 2019-02-28 ASSESSMENT — PATIENT HEALTH QUESTIONNAIRE - PHQ9
SUM OF ALL RESPONSES TO PHQ QUESTIONS 1-9: 0
2. FEELING DOWN, DEPRESSED OR HOPELESS: 0
SUM OF ALL RESPONSES TO PHQ9 QUESTIONS 1 & 2: 0
1. LITTLE INTEREST OR PLEASURE IN DOING THINGS: 0
SUM OF ALL RESPONSES TO PHQ QUESTIONS 1-9: 0

## 2019-03-01 ASSESSMENT — ENCOUNTER SYMPTOMS
RESPIRATORY NEGATIVE: 1
GASTROINTESTINAL NEGATIVE: 1
EYES NEGATIVE: 1

## 2019-04-30 ENCOUNTER — OFFICE VISIT (OUTPATIENT)
Dept: CARDIOLOGY CLINIC | Age: 84
End: 2019-04-30
Payer: MEDICARE

## 2019-04-30 VITALS
WEIGHT: 174 LBS | DIASTOLIC BLOOD PRESSURE: 82 MMHG | BODY MASS INDEX: 23.6 KG/M2 | SYSTOLIC BLOOD PRESSURE: 138 MMHG | HEART RATE: 64 BPM

## 2019-04-30 DIAGNOSIS — R07.9 CHEST PAIN AT REST: Primary | ICD-10-CM

## 2019-04-30 DIAGNOSIS — I25.119 CORONARY ARTERY DISEASE INVOLVING NATIVE CORONARY ARTERY OF NATIVE HEART WITH ANGINA PECTORIS (HCC): ICD-10-CM

## 2019-04-30 DIAGNOSIS — I10 ESSENTIAL HYPERTENSION: ICD-10-CM

## 2019-04-30 PROCEDURE — 99214 OFFICE O/P EST MOD 30 MIN: CPT | Performed by: INTERNAL MEDICINE

## 2019-04-30 PROCEDURE — G8598 ASA/ANTIPLAT THER USED: HCPCS | Performed by: INTERNAL MEDICINE

## 2019-04-30 PROCEDURE — 4040F PNEUMOC VAC/ADMIN/RCVD: CPT | Performed by: INTERNAL MEDICINE

## 2019-04-30 PROCEDURE — G8420 CALC BMI NORM PARAMETERS: HCPCS | Performed by: INTERNAL MEDICINE

## 2019-04-30 PROCEDURE — G8427 DOCREV CUR MEDS BY ELIG CLIN: HCPCS | Performed by: INTERNAL MEDICINE

## 2019-04-30 PROCEDURE — 93000 ELECTROCARDIOGRAM COMPLETE: CPT | Performed by: INTERNAL MEDICINE

## 2019-04-30 PROCEDURE — 1123F ACP DISCUSS/DSCN MKR DOCD: CPT | Performed by: INTERNAL MEDICINE

## 2019-04-30 PROCEDURE — 1036F TOBACCO NON-USER: CPT | Performed by: INTERNAL MEDICINE

## 2019-04-30 NOTE — PROGRESS NOTES
[address]   Dr Velma Mendez. Eugenio STEIN, [facility]    Outpatient Follow Up Note    Subjective:   CHIEF COMPLAINT / HPI:  Follow Up secondary to hypertension, hyperlipidemia and coronary artery disease     Geni Carvajal is 82 y.o. male who presents today for  follow-up. He is doing well without complaints or problems. He did have some issues with his toes and appears at his toenails are very thickened chunky and needs to have a podiatrist.  His vitals are otherwise stable. Lungs are clear and no chest pains. I looked at his feet I do not see any injuries. And cardiac status looks stable. He is still doing his job as a lawn care person. Will return to see me in about 6 months. CAD with previous bypass surgery and angioplasty  Status post TURP  Past Medical History:    Past Medical History:   Diagnosis Date    CAD (coronary artery disease)     Chest pain     Dyslipidemia     Fatigue     Hard of hearing     Hyperthyroidism     SOB (shortness of breath)     Vitamin D deficiency      Past Surgical History  Past Surgical History:   Procedure Laterality Date    CARDIAC SURGERY       Social History:       Social History     Tobacco Use   Smoking Status Former Smoker    Last attempt to quit: 1975    Years since quittin.3   Smokeless Tobacco Never Used     Current Medications:  Prior to Visit Medications    Medication Sig Taking? Authorizing Provider   sildenafil (VIAGRA) 100 MG tablet Take 1 tablet by mouth as needed for Erectile Dysfunction  Joey Hoang MD   metoprolol succinate (TOPROL XL) 25 MG extended release tablet TAKE 1 TABLET BY MOUTH DAILY.   Joey Honag MD   atorvastatin (LIPITOR) 40 MG tablet TAKE 1 TABLET BY MOUTH DAILY FOR CHOLESTEROL  Joey Hoang MD   lisinopril (PRINIVIL;ZESTRIL) 10 MG tablet TAKE 1 TABLET BY MOUTH DAILY FOR BLOOD PRESSURE  Joey Hoang MD   therapeutic multivitamin-minerals [facility]) tablet Take 1 tablet by mouth daily.  Historical Provider, MD   aspirin 81 MG EC tablet Take 81 mg by mouth daily. Historical Provider, MD   Cholecalciferol (VITAMIN D) 1000 UNIT CAPS Take  by mouth daily. Historical Provider, MD     Family History  No family history on file. Current Medications  Current Outpatient Medications   Medication Sig Dispense Refill    sildenafil (VIAGRA) 100 MG tablet Take 1 tablet by mouth as needed for Erectile Dysfunction 6 tablet 5    metoprolol succinate (TOPROL XL) 25 MG extended release tablet TAKE 1 TABLET BY MOUTH DAILY. 90 tablet 3    atorvastatin (LIPITOR) 40 MG tablet TAKE 1 TABLET BY MOUTH DAILY FOR CHOLESTEROL 90 tablet 3    lisinopril (PRINIVIL;ZESTRIL) 10 MG tablet TAKE 1 TABLET BY MOUTH DAILY FOR BLOOD PRESSURE 90 tablet 3    therapeutic multivitamin-minerals (THERAGRAN-M) tablet Take 1 tablet by mouth daily.  aspirin 81 MG EC tablet Take 81 mg by mouth daily.  Cholecalciferol (VITAMIN D) 1000 UNIT CAPS Take  by mouth daily. No current facility-administered medications for this visit. REVIEW OF SYSTEMS:    CONSTITUTIONAL: No major weight gain or loss, fatigue, weakness, night sweats or fever. HEENT: No new vision difficulties or ringing in the ears. RESPIRATORY: No new SOB, PND, orthopnea or cough. CARDIOVASCULAR: See HPI  GI: No nausea, vomiting, diarrhea, constipation, abdominal pain or changes in bowel habits. : No urinary frequency, urgency, incontinence hematuria or dysuria. SKIN: No cyanosis or skin lesions. MUSCULOSKELETAL: No new muscle or joint pain. NEUROLOGICAL: No syncope or TIA-like symptoms.   PSYCHIATRIC: No anxiety, pain, insomnia or depression    Objective:   PHYSICAL EXAM:        VITALS:    Wt Readings from Last 3 Encounters:   04/30/19 174 lb (78.9 kg)   02/28/19 177 lb (80.3 kg)   11/28/18 175 lb 3.2 oz (79.5 kg)     BP Readings from Last 3 Encounters:   04/30/19 138/82   02/28/19 122/62   11/28/18 128/60     Pulse Readings from Last 3 Encounters:   04/30/19 64   02/28/19 76   11/28/18 63       CONSTITUTIONAL: Cooperative, no apparent distress, and appears well nourished / developed  NEUROLOGIC:  Awake and orientated to person, place and time. PSYCH: Calm affect. SKIN: Warm and dry. HEENT: Sclera non-icteric, normocephalic, neck supple, no elevation of JVP, normal carotid pulses with no bruits and thyroid normal size. LUNGS:  No increased work of breathing and clear to auscultation, no crackles or wheezing  CARDIOVASCULAR: No edema. The lungs are clear the heart is regular the abdomen is soft. Cervical veins are not engorged  The carotid upstroke is normal in amplitude and contour without delay or bruit  JVP is not elevated  ABDOMEN:  Normal bowel sounds, non-distended and non-tender to palpation  EXT: No edema, no calf tenderness. Pulses are present bilaterally.     DATA:    Lab Results   Component Value Date    ALT 16 04/24/2018    AST 20 04/24/2018    ALKPHOS 68 04/24/2018    BILITOT 0.3 04/24/2018     Lab Results   Component Value Date    CREATININE 0.7 (L) 02/28/2019    BUN 11 02/28/2019     02/28/2019    K 4.1 02/28/2019     02/28/2019    CO2 28 02/28/2019     Lab Results   Component Value Date    TSH 0.96 12/15/2011     Lab Results   Component Value Date    WBC 3.7 (L) 02/28/2019    HGB 13.1 (L) 02/28/2019    HCT 40.6 02/28/2019    MCV 87.1 02/28/2019     02/28/2019     No components found for: CHLPL  Lab Results   Component Value Date    TRIG 42 02/28/2019    TRIG 58 04/24/2018    TRIG 54 05/15/2017     Lab Results   Component Value Date    HDL 56 02/28/2019    HDL 64 (H) 04/24/2018    HDL 64 (H) 05/15/2017     Lab Results   Component Value Date    LDLCALC 78 02/28/2019    LDLCALC 99 04/24/2018    LDLCALC 85 05/15/2017     Lab Results   Component Value Date    LABVLDL 8 02/28/2019    LABVLDL 12 04/24/2018    LABVLDL 11 05/15/2017     Radiology Review:  Pertinent images / reports were reviewed as a part of this visit and reveals the following:    Last Echo:    Last Stress Test / Angiogram: 12/2/15  Conclusions        Summary    No evidence for ischemia.    TID is normal at 0.91    Left ventricular ejection fraction of 52 %.           Last ECG: Sinus rhythm with RBBB pattern. She is stable compared to 2011. EKG today on 9/21/17 shows sinus rhythm right bundle branch block pattern 75/m. Assessment:   CAD and post bypass. Plan:     Continue current therapy. He should be seeing a podiatrist for his feet and toenails. Return to see me in 6 months. All else looks stable. Please call if we can assist further [phone]. Eirc Perez.  Eugenio STEIN, [facility]      This note was likely completed using voice recognition technology and may contain unintended errors

## 2019-06-05 ENCOUNTER — OFFICE VISIT (OUTPATIENT)
Dept: INTERNAL MEDICINE CLINIC | Age: 84
End: 2019-06-05
Payer: MEDICARE

## 2019-06-05 VITALS
OXYGEN SATURATION: 97 % | HEIGHT: 71 IN | BODY MASS INDEX: 23.52 KG/M2 | WEIGHT: 168 LBS | DIASTOLIC BLOOD PRESSURE: 72 MMHG | SYSTOLIC BLOOD PRESSURE: 128 MMHG | HEART RATE: 88 BPM

## 2019-06-05 DIAGNOSIS — I10 ESSENTIAL HYPERTENSION: ICD-10-CM

## 2019-06-05 DIAGNOSIS — R68.89 FORGETFULNESS: ICD-10-CM

## 2019-06-05 DIAGNOSIS — R21 RASH: ICD-10-CM

## 2019-06-05 DIAGNOSIS — E78.2 MIXED HYPERLIPIDEMIA: ICD-10-CM

## 2019-06-05 DIAGNOSIS — H91.93 DECREASED HEARING OF BOTH EARS: Primary | ICD-10-CM

## 2019-06-05 PROCEDURE — G8420 CALC BMI NORM PARAMETERS: HCPCS | Performed by: INTERNAL MEDICINE

## 2019-06-05 PROCEDURE — G8427 DOCREV CUR MEDS BY ELIG CLIN: HCPCS | Performed by: INTERNAL MEDICINE

## 2019-06-05 PROCEDURE — G8598 ASA/ANTIPLAT THER USED: HCPCS | Performed by: INTERNAL MEDICINE

## 2019-06-05 PROCEDURE — 99214 OFFICE O/P EST MOD 30 MIN: CPT | Performed by: INTERNAL MEDICINE

## 2019-06-05 PROCEDURE — 1036F TOBACCO NON-USER: CPT | Performed by: INTERNAL MEDICINE

## 2019-06-05 PROCEDURE — 1123F ACP DISCUSS/DSCN MKR DOCD: CPT | Performed by: INTERNAL MEDICINE

## 2019-06-05 PROCEDURE — 4040F PNEUMOC VAC/ADMIN/RCVD: CPT | Performed by: INTERNAL MEDICINE

## 2019-06-05 RX ORDER — CLOTRIMAZOLE AND BETAMETHASONE DIPROPIONATE 10; .64 MG/G; MG/G
CREAM TOPICAL
Qty: 15 G | Refills: 0 | Status: SHIPPED | OUTPATIENT
Start: 2019-06-05 | End: 2022-10-26 | Stop reason: ALTCHOICE

## 2019-06-05 ASSESSMENT — PATIENT HEALTH QUESTIONNAIRE - PHQ9
SUM OF ALL RESPONSES TO PHQ QUESTIONS 1-9: 0
1. LITTLE INTEREST OR PLEASURE IN DOING THINGS: 0
SUM OF ALL RESPONSES TO PHQ9 QUESTIONS 1 & 2: 0
SUM OF ALL RESPONSES TO PHQ QUESTIONS 1-9: 0
2. FEELING DOWN, DEPRESSED OR HOPELESS: 0

## 2019-06-05 NOTE — PROGRESS NOTES
Subjective:      Patient ID: Gentry Lundberg is a 82 y.o. male. HPIHe is here for a check up and f/u on HTN, HLD, h/o CAD. 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 He says his memory is a problem at times. Is forgetful. Review of Systems   Constitutional: Negative. HENT:        Decrease hearing see HPI. Eyes: Negative. Respiratory: Negative. Cardiovascular:        CAD, H/O CABG and stent. Denies CP or SOB with exertion. HTN, treated with B BLKer. Says he has not taken lisinopril for > 1 year. Gastrointestinal: Negative. Endocrine:        Hyperlipidemia, treated with statin. LDL 78. Genitourinary: Negative. Musculoskeletal: Negative. Skin:        Has rash on buttock. It is pruritic. Psychiatric/Behavioral:        Forgetfulness. See HPI. Takes care of ADL without problem. Physical Exam   Constitutional: He is oriented to person, place, and time. He appears well-developed and well-nourished. No distress. HENT:   Head: Normocephalic and atraumatic. Right Ear: External ear normal.   Left Ear: External ear normal.   Nose: Nose normal.   Mouth/Throat: Oropharynx is clear and moist.   TMs negative. Decrease high pitch hearing bilaterally. Eyes: Pupils are equal, round, and reactive to light. Conjunctivae and EOM are normal. No scleral icterus. Neck: Normal range of motion. Neck supple. No thyromegaly present. Cardiovascular: Normal rate, regular rhythm, normal heart sounds and intact distal pulses. Pulmonary/Chest: Effort normal and breath sounds normal.   Abdominal: Soft. Bowel sounds are normal. He exhibits no mass. Lymphadenopathy:     He has no cervical adenopathy. Neurological: He is alert and oriented to person, place, and time. He has normal reflexes. Skin: Skin is warm and dry. Scaly macular papular rash, buttocks. Psychiatric: He has a normal mood and affect. His behavior is normal. Judgment and thought content normal.       Assessment:        Diagnosis Orders   1. Decreased hearing of both ears  High pitch hearing loss. Yazan Magaña MD, Otolaryngology, [facility]   2. Rash  Suspect fungal infection. clotrimazole-betamethasone (LOTRISONE) 1-0.05 % cream   3. Essential hypertension  Stable. Continue same med regimen. DASH diet discussed. 4. Mixed hyperlipidemia  Heart healthy diet and statin compliance discussed. 5. Forgetfulness  Mild cognitive and memory challenges. Highly functional.   Discussed planning and organization. Plan:    See plans above.         Theodore Kaufman MD

## 2019-06-19 ASSESSMENT — ENCOUNTER SYMPTOMS
EYES NEGATIVE: 1
GASTROINTESTINAL NEGATIVE: 1
RESPIRATORY NEGATIVE: 1

## 2019-08-05 ENCOUNTER — TELEPHONE (OUTPATIENT)
Dept: INTERNAL MEDICINE CLINIC | Age: 84
End: 2019-08-05

## 2019-08-05 NOTE — TELEPHONE ENCOUNTER
Pt stated that he is needing 3 of his medication filled for blood pressure. Did not know the name of the medications when called.  Stated he spoke with his pharmacy and told him he had to call us for an appointment    [facility] [address] [phone] Betty Hallie [phone]       Last office visit: 6/05/2019  Next office visit: 9/12/2019

## 2019-08-14 DIAGNOSIS — I10 ESSENTIAL HYPERTENSION: ICD-10-CM

## 2019-08-21 RX ORDER — LISINOPRIL 10 MG/1
TABLET ORAL
Qty: 90 TABLET | Refills: 0 | Status: SHIPPED | OUTPATIENT
Start: 2019-08-21 | End: 2020-01-09

## 2019-10-30 ENCOUNTER — OFFICE VISIT (OUTPATIENT)
Dept: CARDIOLOGY CLINIC | Age: 84
End: 2019-10-30
Payer: MEDICARE

## 2019-10-30 VITALS
BODY MASS INDEX: 22.87 KG/M2 | SYSTOLIC BLOOD PRESSURE: 130 MMHG | WEIGHT: 164 LBS | HEART RATE: 56 BPM | DIASTOLIC BLOOD PRESSURE: 72 MMHG

## 2019-10-30 DIAGNOSIS — R97.20 ELEVATED PSA: ICD-10-CM

## 2019-10-30 DIAGNOSIS — I25.119 CORONARY ARTERY DISEASE INVOLVING NATIVE CORONARY ARTERY OF NATIVE HEART WITH ANGINA PECTORIS (HCC): ICD-10-CM

## 2019-10-30 DIAGNOSIS — I25.119 CORONARY ARTERY DISEASE INVOLVING NATIVE CORONARY ARTERY OF NATIVE HEART WITH ANGINA PECTORIS (HCC): Primary | ICD-10-CM

## 2019-10-30 DIAGNOSIS — N40.1 BENIGN PROSTATIC HYPERPLASIA WITH LOWER URINARY TRACT SYMPTOMS, SYMPTOM DETAILS UNSPECIFIED: ICD-10-CM

## 2019-10-30 DIAGNOSIS — E78.5 HYPERLIPIDEMIA, UNSPECIFIED HYPERLIPIDEMIA TYPE: ICD-10-CM

## 2019-10-30 LAB
ALBUMIN SERPL-MCNC: 4 G/DL (ref 3.4–5)
ANION GAP SERPL CALCULATED.3IONS-SCNC: 12 MMOL/L (ref 3–16)
BILIRUBIN URINE: NEGATIVE
BLOOD, URINE: NEGATIVE
BUN BLDV-MCNC: 14 MG/DL (ref 7–20)
CALCIUM SERPL-MCNC: 9.4 MG/DL (ref 8.3–10.6)
CHLORIDE BLD-SCNC: 100 MMOL/L (ref 99–110)
CLARITY: CLEAR
CO2: 29 MMOL/L (ref 21–32)
COLOR: YELLOW
CREAT SERPL-MCNC: 0.8 MG/DL (ref 0.8–1.3)
EPITHELIAL CELLS, UA: 0 /HPF (ref 0–5)
GFR AFRICAN AMERICAN: >60
GFR NON-AFRICAN AMERICAN: >60
GLUCOSE BLD-MCNC: 83 MG/DL (ref 70–99)
GLUCOSE URINE: NEGATIVE MG/DL
HYALINE CASTS: 0 /LPF (ref 0–8)
KETONES, URINE: NEGATIVE MG/DL
LEUKOCYTE ESTERASE, URINE: NEGATIVE
MICROSCOPIC EXAMINATION: YES
NITRITE, URINE: NEGATIVE
PH UA: 7 (ref 5–8)
PHOSPHORUS: 4 MG/DL (ref 2.5–4.9)
POTASSIUM SERPL-SCNC: 4.7 MMOL/L (ref 3.5–5.1)
PROTEIN UA: ABNORMAL MG/DL
RBC UA: 4 /HPF (ref 0–4)
SODIUM BLD-SCNC: 141 MMOL/L (ref 136–145)
SPECIFIC GRAVITY UA: 1.02 (ref 1–1.03)
URINE REFLEX TO CULTURE: ABNORMAL
URINE TYPE: ABNORMAL
UROBILINOGEN, URINE: 0.2 E.U./DL
WBC UA: 0 /HPF (ref 0–5)

## 2019-10-30 PROCEDURE — 99214 OFFICE O/P EST MOD 30 MIN: CPT | Performed by: INTERNAL MEDICINE

## 2019-10-30 PROCEDURE — G8420 CALC BMI NORM PARAMETERS: HCPCS | Performed by: INTERNAL MEDICINE

## 2019-10-30 PROCEDURE — G8484 FLU IMMUNIZE NO ADMIN: HCPCS | Performed by: INTERNAL MEDICINE

## 2019-10-30 PROCEDURE — G8427 DOCREV CUR MEDS BY ELIG CLIN: HCPCS | Performed by: INTERNAL MEDICINE

## 2019-10-30 PROCEDURE — 4040F PNEUMOC VAC/ADMIN/RCVD: CPT | Performed by: INTERNAL MEDICINE

## 2019-10-30 PROCEDURE — G8598 ASA/ANTIPLAT THER USED: HCPCS | Performed by: INTERNAL MEDICINE

## 2019-10-30 PROCEDURE — 1123F ACP DISCUSS/DSCN MKR DOCD: CPT | Performed by: INTERNAL MEDICINE

## 2019-10-30 PROCEDURE — 1036F TOBACCO NON-USER: CPT | Performed by: INTERNAL MEDICINE

## 2019-10-31 LAB
ESTIMATED AVERAGE GLUCOSE: 122.6 MG/DL
HBA1C MFR BLD: 5.9 %

## 2020-01-09 RX ORDER — LISINOPRIL 10 MG/1
TABLET ORAL
Qty: 90 TABLET | Refills: 0 | Status: SHIPPED | OUTPATIENT
Start: 2020-01-09 | End: 2020-04-24 | Stop reason: SDUPTHER

## 2020-04-14 ENCOUNTER — TELEPHONE (OUTPATIENT)
Dept: INTERNAL MEDICINE CLINIC | Age: 85
End: 2020-04-14

## 2020-04-23 ASSESSMENT — PATIENT HEALTH QUESTIONNAIRE - PHQ9
SUM OF ALL RESPONSES TO PHQ QUESTIONS 1-9: 1
SUM OF ALL RESPONSES TO PHQ9 QUESTIONS 1 & 2: 1
1. LITTLE INTEREST OR PLEASURE IN DOING THINGS: 0
SUM OF ALL RESPONSES TO PHQ QUESTIONS 1-9: 1
2. FEELING DOWN, DEPRESSED OR HOPELESS: 1

## 2020-04-24 ENCOUNTER — VIRTUAL VISIT (OUTPATIENT)
Dept: INTERNAL MEDICINE CLINIC | Age: 85
End: 2020-04-24
Payer: COMMERCIAL

## 2020-04-24 PROCEDURE — G8420 CALC BMI NORM PARAMETERS: HCPCS | Performed by: INTERNAL MEDICINE

## 2020-04-24 PROCEDURE — G8427 DOCREV CUR MEDS BY ELIG CLIN: HCPCS | Performed by: INTERNAL MEDICINE

## 2020-04-24 PROCEDURE — 99442 PR PHYS/QHP TELEPHONE EVALUATION 11-20 MIN: CPT | Performed by: INTERNAL MEDICINE

## 2020-04-24 RX ORDER — LISINOPRIL 10 MG/1
10 TABLET ORAL DAILY
Qty: 90 TABLET | Refills: 3 | Status: SHIPPED | OUTPATIENT
Start: 2020-04-24 | End: 2020-09-24 | Stop reason: SDUPTHER

## 2020-04-24 RX ORDER — METOPROLOL SUCCINATE 25 MG/1
25 TABLET, EXTENDED RELEASE ORAL DAILY
Qty: 90 TABLET | Refills: 3 | Status: SHIPPED | OUTPATIENT
Start: 2020-04-24 | End: 2020-09-24 | Stop reason: SDUPTHER

## 2020-04-24 RX ORDER — TAMSULOSIN HYDROCHLORIDE 0.4 MG/1
0.4 CAPSULE ORAL DAILY
Qty: 90 CAPSULE | Refills: 3 | Status: SHIPPED | OUTPATIENT
Start: 2020-04-24 | End: 2020-09-24 | Stop reason: SDUPTHER

## 2020-04-24 RX ORDER — ATORVASTATIN CALCIUM 40 MG/1
40 TABLET, FILM COATED ORAL DAILY
Qty: 90 TABLET | Refills: 3 | Status: SHIPPED | OUTPATIENT
Start: 2020-04-24 | End: 2020-09-24 | Stop reason: SDUPTHER

## 2020-09-24 ENCOUNTER — OFFICE VISIT (OUTPATIENT)
Dept: INTERNAL MEDICINE CLINIC | Age: 85
End: 2020-09-24
Payer: COMMERCIAL

## 2020-09-24 VITALS
SYSTOLIC BLOOD PRESSURE: 128 MMHG | WEIGHT: 161 LBS | DIASTOLIC BLOOD PRESSURE: 68 MMHG | HEART RATE: 76 BPM | HEIGHT: 71 IN | TEMPERATURE: 97.6 F | BODY MASS INDEX: 22.54 KG/M2 | OXYGEN SATURATION: 99 %

## 2020-09-24 PROCEDURE — G0008 ADMIN INFLUENZA VIRUS VAC: HCPCS | Performed by: INTERNAL MEDICINE

## 2020-09-24 PROCEDURE — 90694 VACC AIIV4 NO PRSRV 0.5ML IM: CPT | Performed by: INTERNAL MEDICINE

## 2020-09-24 PROCEDURE — 99214 OFFICE O/P EST MOD 30 MIN: CPT | Performed by: INTERNAL MEDICINE

## 2020-09-24 RX ORDER — LISINOPRIL 10 MG/1
10 TABLET ORAL DAILY
Qty: 90 TABLET | Refills: 3 | Status: SHIPPED | OUTPATIENT
Start: 2020-09-24 | End: 2021-08-09 | Stop reason: SDUPTHER

## 2020-09-24 RX ORDER — TAMSULOSIN HYDROCHLORIDE 0.4 MG/1
0.4 CAPSULE ORAL DAILY
Qty: 90 CAPSULE | Refills: 3 | Status: SHIPPED | OUTPATIENT
Start: 2020-09-24 | End: 2021-09-16 | Stop reason: SDUPTHER

## 2020-09-24 RX ORDER — METOPROLOL SUCCINATE 25 MG/1
25 TABLET, EXTENDED RELEASE ORAL DAILY
Qty: 90 TABLET | Refills: 3 | Status: SHIPPED | OUTPATIENT
Start: 2020-09-24 | End: 2021-08-09 | Stop reason: SDUPTHER

## 2020-09-24 RX ORDER — ATORVASTATIN CALCIUM 40 MG/1
40 TABLET, FILM COATED ORAL DAILY
Qty: 90 TABLET | Refills: 3 | Status: SHIPPED | OUTPATIENT
Start: 2020-09-24 | End: 2021-09-16 | Stop reason: SDUPTHER

## 2020-09-24 NOTE — PROGRESS NOTES
Vaccine Information Sheet, \"Influenza - Inactivated\"  given to Celia Girard, or parent/legal guardian of  Celia Girard and verbalized understanding. Patient responses:    Have you ever had a reaction to a flu vaccine? No  Do you have any current illness? No  Have you ever had Guillian Tad Syndrome? No  Do you have a serious allergy to any of the follow: Neomycin, Polymyxin, Thimerosal, eggs or egg products? No    Flu vaccine given per order. Please see immunization tab. Risks and benefits explained. Current VIS given.       Immunizations Administered     Name Date Dose Route    Influenza, Triv, inactivated, subunit, adjuvanted, IM (Fluad 65 yrs and older) 9/24/2020 0.5 mL Intramuscular    Site: Deltoid- Right    Lot: [id number]    [address]: 64675-004-71

## 2020-09-24 NOTE — PROGRESS NOTES
Subjective:      Patient ID: Dionna Kumar is a 82 y.o. male. HPIHe is here for a check up. Concerned about memory. Does not retain information. This was a problem before but now it is worse. Dementia w/u negative a few years ago including lab studies and CT of head. He still works with his lawn business. Plans to cut back some. Son pays his bills. Daughter also assists. He can pretty much do most other things. Still drives. He forgets at times where he is going if destination is unfamiliar. He has CAD and denies CP or SOB. Review of Systems   Constitutional: Negative. HENT: Negative. Eyes: Negative. Respiratory: Negative. Cardiovascular:        CAD, no CP or SOB with exertion. Gastrointestinal: Negative. Endocrine:        HLD, treated with statin. Last LDL 99. Current pending. Genitourinary: Positive for frequency and urgency. Negative for decreased urine volume, difficulty urinating, discharge, dysuria, flank pain, hematuria, penile pain, penile swelling, scrotal swelling and testicular pain. Urinates often. H/O BPH. Musculoskeletal: Negative. Skin: Negative. Neurological: Negative for dizziness, tremors, seizures, speech difficulty, weakness, light-headedness and numbness. See HPI. Psychiatric/Behavioral: Positive for confusion and decreased concentration. Negative for agitation, behavioral problems, dysphoric mood and sleep disturbance. The patient is not nervous/anxious. See HPI. Objective:   Physical Exam  Constitutional:       General: He is not in acute distress. Appearance: He is well-developed. HENT:      Head: Normocephalic and atraumatic. Right Ear: External ear normal.      Left Ear: External ear normal.      Nose: Nose normal.   Eyes:      General: No scleral icterus. Conjunctiva/sclera: Conjunctivae normal.      Pupils: Pupils are equal, round, and reactive to light.    Neck:      Musculoskeletal: Normal range of motion and neck supple. Thyroid: No thyromegaly. Cardiovascular:      Rate and Rhythm: Normal rate and regular rhythm. Heart sounds: Normal heart sounds. Pulmonary:      Effort: Pulmonary effort is normal.      Breath sounds: Normal breath sounds. Abdominal:      General: Bowel sounds are normal.      Palpations: Abdomen is soft. There is no mass. Lymphadenopathy:      Cervical: No cervical adenopathy. Skin:     General: Skin is warm and dry. Neurological:      Mental Status: He is alert and oriented to person, place, and time. Deep Tendon Reflexes: Reflexes are normal and symmetric. Psychiatric:         Behavior: Behavior normal.         Thought Content: Thought content normal.         Judgment: Judgment normal.      Comments: Remembered 2 of 3 objects. Created clock with correct time. Assessment:        Diagnosis Orders   1. MCI (mild cognitive impairment) with memory loss  Mild dementia. Still very functional.   Diet, physical activity, which he gets, and proper rest discussed. MVI recommended. Memory and cognitive exercises suggested. Geriatric psych consult. Will dicussed with family. 2. Essential hypertension  lisinopril (PRINIVIL;ZESTRIL) 10 MG tablet    metoprolol succinate (TOPROL XL) 25 MG extended release tablet  DASH and low sodium diet discussed. COMPREHENSIVE METABOLIC PANEL    TSH with Reflex    CBC WITH AUTO DIFFERENTIAL    MICROALBUMIN / CREATININE URINE RATIO   3. Pure hypercholesterolemia  atorvastatin (LIPITOR) 40 MG tablet    Lipid Panel  Heart healthy diet and statin compliance discussed. 4. Flu vaccine need  INFLUENZA, TRIV, INACTIVATED, SUBUNIT, ADJUVANTED, 65 YRS AND OLDER, IM, PREFILL SYR, 0.5ML (FLUAD TRIV)   5. Benign prostatic hyperplasia with urinary frequency  tamsulosin (FLOMAX) 0.4 MG capsule    MARYANN - Elvis Bolton MD, [facility]   6.  IGT (impaired glucose tolerance)  HEMOGLOBIN A1C  Diet discussed. 7. Acute cystitis without hematuria  Suspect symptoms are more likely from BPH. Need to rule out uti however. URINALYSIS    Culture, Urine   8. Screening PSA (prostate specific antigen)  Psa screening               Plan:    See plans above.          Garret Scott MD

## 2020-09-25 ENCOUNTER — TELEPHONE (OUTPATIENT)
Dept: INTERNAL MEDICINE CLINIC | Age: 85
End: 2020-09-25

## 2020-09-25 NOTE — TELEPHONE ENCOUNTER
----- Message from Zaida Verdin sent at 9/25/2020 11:04 AM EDT -----  Subject: Message to Provider    QUESTIONS  Information for Provider? pt son Tata Crawford stated on his father discharge   papers it told him to call dr Warren Forward   however   he was already advised that it was just to set his father up for a f/u.   he still insist that dr Lee Dalton calls him back for clarification.  ---------------------------------------------------------------------------  --------------  [address]  What is the best way for the office to contact you? OK to leave message on   voicemail  Preferred Call Back Phone Number? [phone]  ---------------------------------------------------------------------------  --------------  SCRIPT ANSWERS  Relationship to Patient?  Self

## 2020-09-28 ENCOUNTER — TELEPHONE (OUTPATIENT)
Dept: INTERNAL MEDICINE CLINIC | Age: 85
End: 2020-09-28

## 2020-09-28 NOTE — TELEPHONE ENCOUNTER
Called pt son, could not leave ms, mailbox is full. He will need to bring pt into office to get hippa resigned.  It has

## 2020-10-04 ASSESSMENT — ENCOUNTER SYMPTOMS
EYES NEGATIVE: 1
RESPIRATORY NEGATIVE: 1
GASTROINTESTINAL NEGATIVE: 1

## 2021-01-28 ENCOUNTER — OFFICE VISIT (OUTPATIENT)
Dept: INTERNAL MEDICINE CLINIC | Age: 86
End: 2021-01-28
Payer: COMMERCIAL

## 2021-01-28 VITALS
HEIGHT: 71 IN | BODY MASS INDEX: 23.66 KG/M2 | HEART RATE: 97 BPM | OXYGEN SATURATION: 98 % | TEMPERATURE: 96.9 F | WEIGHT: 169 LBS | SYSTOLIC BLOOD PRESSURE: 122 MMHG | DIASTOLIC BLOOD PRESSURE: 82 MMHG

## 2021-01-28 DIAGNOSIS — E78.2 MIXED HYPERLIPIDEMIA: ICD-10-CM

## 2021-01-28 DIAGNOSIS — R73.02 IGT (IMPAIRED GLUCOSE TOLERANCE): ICD-10-CM

## 2021-01-28 DIAGNOSIS — I10 ESSENTIAL HYPERTENSION: Primary | ICD-10-CM

## 2021-01-28 PROCEDURE — 99214 OFFICE O/P EST MOD 30 MIN: CPT | Performed by: INTERNAL MEDICINE

## 2021-01-28 ASSESSMENT — PATIENT HEALTH QUESTIONNAIRE - PHQ9
SUM OF ALL RESPONSES TO PHQ9 QUESTIONS 1 & 2: 0
2. FEELING DOWN, DEPRESSED OR HOPELESS: 0

## 2021-01-28 NOTE — PROGRESS NOTES
Patient: Jacki Castle is a 82 y.o. male who presents today with the following Chief Complaint(s):    Chief Complaint   Patient presents with    Hypertension         HPIHe is here for a check up and f/u on hypertension, IGT, hyperlipidemia. He is taking medication as prescribed, eats a fairly balanced meal plan including regular vegetables and walks as part of physical activity. Current Outpatient Medications   Medication Sig Dispense Refill    tamsulosin (FLOMAX) 0.4 MG capsule Take 1 capsule by mouth daily for prostate enlargement. 90 capsule 3    lisinopril (PRINIVIL;ZESTRIL) 10 MG tablet Take 1 tablet by mouth daily for blood pressure and heart. 90 tablet 3    atorvastatin (LIPITOR) 40 MG tablet Take 1 tablet by mouth daily for cholesterol. 90 tablet 3    metoprolol succinate (TOPROL XL) 25 MG extended release tablet Take 1 tablet by mouth daily for blood pressure and heart. 90 tablet 3    clotrimazole-betamethasone (LOTRISONE) 1-0.05 % cream Apply topically 2 times daily. 15 g 0    sildenafil (VIAGRA) 100 MG tablet Take 1 tablet by mouth as needed for Erectile Dysfunction 6 tablet 5    therapeutic multivitamin-minerals (THERAGRAN-M) tablet Take 1 tablet by mouth daily.  aspirin 81 MG EC tablet Take 81 mg by mouth daily.  Cholecalciferol (VITAMIN D) 1000 UNIT CAPS Take  by mouth daily. No current facility-administered medications for this visit. Patient's past medical history, surgical history, family history, medications,and allergies  were all reviewed and updated as appropriate today. Review of Systems   Constitutional: Negative. HENT: Negative. Eyes: Negative. Respiratory: Negative. Cardiovascular:        Hypertension, treated with B Blker and ACE I.     CAD with H/O CABG, and PCI. Denies CP or SOB currently. Gastrointestinal: Negative. Endocrine:        IGT, diet controlled. Last A1c 5.9. Hyperlipidemia, treated with statin. Prior .

## 2021-02-01 ASSESSMENT — ENCOUNTER SYMPTOMS
RESPIRATORY NEGATIVE: 1
GASTROINTESTINAL NEGATIVE: 1
EYES NEGATIVE: 1

## 2021-05-14 ENCOUNTER — OFFICE VISIT (OUTPATIENT)
Dept: INTERNAL MEDICINE CLINIC | Age: 86
End: 2021-05-14
Payer: COMMERCIAL

## 2021-05-14 VITALS
SYSTOLIC BLOOD PRESSURE: 140 MMHG | HEIGHT: 71 IN | HEART RATE: 83 BPM | BODY MASS INDEX: 23.77 KG/M2 | DIASTOLIC BLOOD PRESSURE: 70 MMHG | WEIGHT: 169.8 LBS | OXYGEN SATURATION: 96 %

## 2021-05-14 DIAGNOSIS — R73.02 IGT (IMPAIRED GLUCOSE TOLERANCE): ICD-10-CM

## 2021-05-14 DIAGNOSIS — N30.00 ACUTE CYSTITIS WITHOUT HEMATURIA: ICD-10-CM

## 2021-05-14 DIAGNOSIS — I10 ESSENTIAL HYPERTENSION: ICD-10-CM

## 2021-05-14 DIAGNOSIS — E78.00 PURE HYPERCHOLESTEROLEMIA: ICD-10-CM

## 2021-05-14 DIAGNOSIS — N40.1 BENIGN PROSTATIC HYPERPLASIA WITH URINARY FREQUENCY: ICD-10-CM

## 2021-05-14 DIAGNOSIS — I25.10 CORONARY ARTERY DISEASE INVOLVING NATIVE CORONARY ARTERY OF NATIVE HEART WITHOUT ANGINA PECTORIS: ICD-10-CM

## 2021-05-14 DIAGNOSIS — R41.3 IMPAIRED MEMORY: ICD-10-CM

## 2021-05-14 DIAGNOSIS — R25.1 TREMOR OF RIGHT HAND: ICD-10-CM

## 2021-05-14 DIAGNOSIS — G31.84 MCI (MILD COGNITIVE IMPAIRMENT): ICD-10-CM

## 2021-05-14 DIAGNOSIS — Z12.5 SCREENING PSA (PROSTATE SPECIFIC ANTIGEN): ICD-10-CM

## 2021-05-14 DIAGNOSIS — R26.89 BALANCE DISORDER: Primary | ICD-10-CM

## 2021-05-14 DIAGNOSIS — R35.0 BENIGN PROSTATIC HYPERPLASIA WITH URINARY FREQUENCY: ICD-10-CM

## 2021-05-14 LAB
A/G RATIO: 1.9 (ref 1.1–2.2)
ALBUMIN SERPL-MCNC: 4.3 G/DL (ref 3.4–5)
ALP BLD-CCNC: 69 U/L (ref 40–129)
ALT SERPL-CCNC: 23 U/L (ref 10–40)
ANION GAP SERPL CALCULATED.3IONS-SCNC: 12 MMOL/L (ref 3–16)
AST SERPL-CCNC: 24 U/L (ref 15–37)
BASOPHILS ABSOLUTE: 0 K/UL (ref 0–0.2)
BASOPHILS RELATIVE PERCENT: 0.4 %
BILIRUB SERPL-MCNC: 0.4 MG/DL (ref 0–1)
BILIRUBIN URINE: NEGATIVE
BLOOD, URINE: NEGATIVE
BUN BLDV-MCNC: 12 MG/DL (ref 7–20)
CALCIUM SERPL-MCNC: 9.3 MG/DL (ref 8.3–10.6)
CHLORIDE BLD-SCNC: 106 MMOL/L (ref 99–110)
CHOLESTEROL, TOTAL: 146 MG/DL (ref 0–199)
CLARITY: CLEAR
CO2: 25 MMOL/L (ref 21–32)
COLOR: YELLOW
CREAT SERPL-MCNC: 0.9 MG/DL (ref 0.8–1.3)
CREATININE URINE: 158.3 MG/DL (ref 39–259)
EOSINOPHILS ABSOLUTE: 0 K/UL (ref 0–0.6)
EOSINOPHILS RELATIVE PERCENT: 1 %
GFR AFRICAN AMERICAN: >60
GFR NON-AFRICAN AMERICAN: >60
GLOBULIN: 2.3 G/DL
GLUCOSE BLD-MCNC: 83 MG/DL (ref 70–99)
GLUCOSE URINE: NEGATIVE MG/DL
HCT VFR BLD CALC: 38.4 % (ref 40.5–52.5)
HDLC SERPL-MCNC: 58 MG/DL (ref 40–60)
HEMOGLOBIN: 12.9 G/DL (ref 13.5–17.5)
KETONES, URINE: NEGATIVE MG/DL
LDL CHOLESTEROL CALCULATED: 79 MG/DL
LEUKOCYTE ESTERASE, URINE: NEGATIVE
LYMPHOCYTES ABSOLUTE: 1 K/UL (ref 1–5.1)
LYMPHOCYTES RELATIVE PERCENT: 28.4 %
MCH RBC QN AUTO: 28.8 PG (ref 26–34)
MCHC RBC AUTO-ENTMCNC: 33.5 G/DL (ref 31–36)
MCV RBC AUTO: 85.9 FL (ref 80–100)
MICROALBUMIN UR-MCNC: 4 MG/DL
MICROALBUMIN/CREAT UR-RTO: 25.3 MG/G (ref 0–30)
MICROSCOPIC EXAMINATION: NORMAL
MONOCYTES ABSOLUTE: 0.6 K/UL (ref 0–1.3)
MONOCYTES RELATIVE PERCENT: 18.6 %
NEUTROPHILS ABSOLUTE: 1.8 K/UL (ref 1.7–7.7)
NEUTROPHILS RELATIVE PERCENT: 51.6 %
NITRITE, URINE: NEGATIVE
PDW BLD-RTO: 13.4 % (ref 12.4–15.4)
PH UA: 5.5 (ref 5–8)
PLATELET # BLD: 138 K/UL (ref 135–450)
PMV BLD AUTO: 10.5 FL (ref 5–10.5)
POTASSIUM SERPL-SCNC: 3.9 MMOL/L (ref 3.5–5.1)
PROSTATE SPECIFIC ANTIGEN: 0.07 NG/ML (ref 0–4)
PROTEIN UA: NEGATIVE MG/DL
RBC # BLD: 4.47 M/UL (ref 4.2–5.9)
SODIUM BLD-SCNC: 143 MMOL/L (ref 136–145)
SPECIFIC GRAVITY UA: 1.02 (ref 1–1.03)
TOTAL PROTEIN: 6.6 G/DL (ref 6.4–8.2)
TRIGL SERPL-MCNC: 43 MG/DL (ref 0–150)
TSH REFLEX: 1.29 UIU/ML (ref 0.27–4.2)
URINE TYPE: NORMAL
UROBILINOGEN, URINE: 0.2 E.U./DL
VLDLC SERPL CALC-MCNC: 9 MG/DL
WBC # BLD: 3.5 K/UL (ref 4–11)

## 2021-05-14 PROCEDURE — 99214 OFFICE O/P EST MOD 30 MIN: CPT | Performed by: INTERNAL MEDICINE

## 2021-05-14 ASSESSMENT — PATIENT HEALTH QUESTIONNAIRE - PHQ9
SUM OF ALL RESPONSES TO PHQ9 QUESTIONS 1 & 2: 0
2. FEELING DOWN, DEPRESSED OR HOPELESS: 0
SUM OF ALL RESPONSES TO PHQ QUESTIONS 1-9: 0
SUM OF ALL RESPONSES TO PHQ QUESTIONS 1-9: 0
1. LITTLE INTEREST OR PLEASURE IN DOING THINGS: 0
SUM OF ALL RESPONSES TO PHQ QUESTIONS 1-9: 0

## 2021-05-14 NOTE — PROGRESS NOTES
Patient: Matthias Taveras is a 82 y.o. male who presents today with the following Chief Complaint(s):    Chief Complaint   Patient presents with    Follow-up     PT has been walking off balance     Hypertension         HPI He is here for a check up. Concerned about balance. Feels he is not as study as he should be. May sway to one side or the other. No falls. Memory and cognition have been a concern. He has mild impairment as previously noted. Does not pay bills. Admits to being forgetful. 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 His son helps. does not pay bills. Forgetful. Still working. Balance a little off. Right hand resting tremor. Current Outpatient Medications   Medication Sig Dispense Refill    tamsulosin (FLOMAX) 0.4 MG capsule Take 1 capsule by mouth daily for prostate enlargement. 90 capsule 3    lisinopril (PRINIVIL;ZESTRIL) 10 MG tablet Take 1 tablet by mouth daily for blood pressure and heart. 90 tablet 3    atorvastatin (LIPITOR) 40 MG tablet Take 1 tablet by mouth daily for cholesterol. 90 tablet 3    metoprolol succinate (TOPROL XL) 25 MG extended release tablet Take 1 tablet by mouth daily for blood pressure and heart. 90 tablet 3    clotrimazole-betamethasone (LOTRISONE) 1-0.05 % cream Apply topically 2 times daily. 15 g 0    sildenafil (VIAGRA) 100 MG tablet Take 1 tablet by mouth as needed for Erectile Dysfunction 6 tablet 5    therapeutic multivitamin-minerals (THERAGRAN-M) tablet Take 1 tablet by mouth daily.  aspirin 81 MG EC tablet Take 81 mg by mouth daily.  Cholecalciferol (VITAMIN D) 1000 UNIT CAPS Take  by mouth daily. No current facility-administered medications for this visit. Patient's past medical history, surgical history, family history, medications,and allergies  were all reviewed and updated as appropriate today. Review of Systems   Constitutional: Negative. HENT: Negative. Eyes: Negative. Respiratory: Negative. Cardiovascular:        CAD with past h/o CABG and PCI. Denies CP or sob. Hypertension, treated with B blker and ACE I.    Gastrointestinal: Negative. Endocrine:        IGT, diet controlled. Last A1c 5.8. Genitourinary: Negative. Musculoskeletal: Negative. Skin: Negative. Neurological:        See HPI. Psychiatric/Behavioral: Positive for decreased concentration. Physical Exam  Constitutional:       General: He is not in acute distress. Appearance: He is well-developed. HENT:      Head: Normocephalic and atraumatic. Right Ear: External ear normal.      Left Ear: External ear normal.      Nose: Nose normal.   Eyes:      General: No scleral icterus. Conjunctiva/sclera: Conjunctivae normal.      Pupils: Pupils are equal, round, and reactive to light. Neck:      Thyroid: No thyromegaly. Cardiovascular:      Rate and Rhythm: Normal rate and regular rhythm. Heart sounds: Normal heart sounds. Pulmonary:      Effort: Pulmonary effort is normal.      Breath sounds: Normal breath sounds. Abdominal:      General: Bowel sounds are normal.      Palpations: Abdomen is soft. There is no mass. Musculoskeletal:      Cervical back: Normal range of motion and neck supple. Lymphadenopathy:      Cervical: No cervical adenopathy. Skin:     General: Skin is warm and dry. Neurological:      Mental Status: He is alert and oriented to person, place, and time. Deep Tendon Reflexes: Reflexes are normal and symmetric. Comments: Mild memory and cognitive challenges as previously outlined. Proprioception ok. Gait is slightly wide based, mildly shuffling, need to self correct tendency to sway to one side of the other. Right hand fine, resting tremor. Psychiatric:         Behavior: Behavior normal.         Thought Content:  Thought content normal.         Judgment: Judgment normal.         Vitals:    05/14/21 1054   BP: (!) 140/70 Pulse:    SpO2:        Assessment:   Diagnosis Orders   1. Balance disorder  Altered gait as noted. Resting tremor. ? Early parkinson's. Needs neurological work up. Given balance and gait exercises. [facility] DO, Neurology, [place]   2. Benign prostatic hyperplasia with urinary frequency  Diet discussed. Continue flomax. 3. Coronary artery disease involving native coronary artery of native heart without angina pectoris  Heart healthy lifestyle discussed. Med compliance stressed. 4. MCI (mild cognitive impairment)  Exercises given. Healthy lifestyle discussed. Highly functional.  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 5. Tremor of right hand  See above. 6. Impaired memory  Exercises, healthy diet and lifestyle stressed. Plan:  See plans above.

## 2021-05-15 LAB
ESTIMATED AVERAGE GLUCOSE: 119.8 MG/DL
HBA1C MFR BLD: 5.8 %
URINE CULTURE, ROUTINE: NORMAL

## 2021-06-13 PROBLEM — R25.1 TREMOR OF RIGHT HAND: Status: ACTIVE | Noted: 2021-06-13

## 2021-06-13 PROBLEM — R26.89 BALANCE DISORDER: Status: ACTIVE | Noted: 2021-06-13

## 2021-06-13 PROBLEM — R41.3 IMPAIRED MEMORY: Status: ACTIVE | Noted: 2021-06-13

## 2021-06-13 ASSESSMENT — ENCOUNTER SYMPTOMS
EYES NEGATIVE: 1
RESPIRATORY NEGATIVE: 1
GASTROINTESTINAL NEGATIVE: 1

## 2021-07-26 ENCOUNTER — NURSE TRIAGE (OUTPATIENT)
Dept: OTHER | Facility: CLINIC | Age: 86
End: 2021-07-26

## 2021-07-26 NOTE — TELEPHONE ENCOUNTER
Received call from [facility] at [facility]ARINE with Red Flag Complaint. Brief description of triage: Son calling. Called 911 over the weekend due to not being able to walk. Paramedics checked vitals and told him to follow up with PCP. Sister with pt now-states is not able to move much. Attempted to call [place] (daughter that is with pt). No answer. Also attempted to call pt cell with no answer and unable to leave a message. Will try to call pt back. Attempted to call pt again, no answer, unable to leave message. Care advice provided, patient verbalizes understanding; denies any other questions or concerns; instructed to call back for any new or worsening symptoms. Attention Provider: Thank you for allowing me to participate in the care of your patient. The patient was connected to triage in response to information provided to the ECC. Please do not respond through this encounter as the response is not directed to a shared pool. Reason for Disposition   No answer. First attempt to contact caller. Follow-up call scheduled within 15 minutes.     Protocols used: NO CONTACT OR DUPLICATE CONTACT CALL-ADULT-[place]
greater than 3 times/day

## 2021-08-06 ENCOUNTER — NURSE TRIAGE (OUTPATIENT)
Dept: OTHER | Facility: CLINIC | Age: 86
End: 2021-08-06

## 2021-08-06 NOTE — TELEPHONE ENCOUNTER
Reason for Disposition   Patient wants to be seen    Answer Assessment - Initial Assessment Questions  1. DESCRIPTION: \"Describe your dizziness. \"      Dizzy    2. LIGHTHEADED: \"Do you feel lightheaded? \" (e.g., somewhat faint, woozy, weak upon standing)      Woozy at times    3. VERTIGO: \"Do you feel like either you or the room is spinning or tilting? \" (i.e. vertigo)      Unsure    4. SEVERITY: \"How bad is it? \"  \"Do you feel like you are going to faint? \" \"Can you stand and walk? \"    - MILD - walking normally    - MODERATE - interferes with normal activities (e.g., work, school)     - SEVERE - unable to stand, requires support to walk, feels like passing out now. Mild to mod    5. ONSET:  \"When did the dizziness begin? \"      6 months ago    6. AGGRAVATING FACTORS: \"Does anything make it worse? \" (e.g., standing, change in head position)      More dizzy with quick movements    7. HEART RATE: \"Can you tell me your heart rate? \" \"How many beats in 15 seconds? \"  (Note: not all patients can do this)        Denies    8. CAUSE: \"What do you think is causing the dizziness? \"      Unsure    9. RECURRENT SYMPTOM: \"Have you had dizziness before? \" If so, ask: \"When was the last time? \" \"What happened that time? \"      On/off for 6 months    10. OTHER SYMPTOMS: \"Do you have any other symptoms? \" (e.g., fever, chest pain, vomiting, diarrhea, bleeding)      Cp maybe,     11. PREGNANCY: \"Is there any chance you are pregnant? \" \"When was your last menstrual period? \"        n/a    Protocols used: DIZZINESS-ADULT-[place]    Received call from MONI/Margareth Nichols 1070 at [facility] with Red Flag Complaint.     Brief description of triage: as above c/o dizziness for 6 months states have to move slow and walk slowly denies fever     Triage indicates for patient to be seen today if [place] go [place] [facility]    Care advice provided, patient verbalizes understanding; denies any other questions or concerns; instructed to call back for any new or worsening symptoms. Writer provided warm transfer to [facility] for appointment scheduling. Attention Provider: Thank you for allowing me to participate in the care of your patient. The patient was connected to triage in response to information provided to the ECC. Please do not respond through this encounter as the response is not directed to a shared pool.

## 2021-08-09 ENCOUNTER — OFFICE VISIT (OUTPATIENT)
Dept: INTERNAL MEDICINE CLINIC | Age: 86
End: 2021-08-09
Payer: COMMERCIAL

## 2021-08-09 VITALS
HEIGHT: 71 IN | OXYGEN SATURATION: 99 % | DIASTOLIC BLOOD PRESSURE: 70 MMHG | BODY MASS INDEX: 21.98 KG/M2 | SYSTOLIC BLOOD PRESSURE: 130 MMHG | HEART RATE: 92 BPM | WEIGHT: 157 LBS

## 2021-08-09 DIAGNOSIS — R42 DIZZINESS: Primary | ICD-10-CM

## 2021-08-09 DIAGNOSIS — R26.89 BALANCE DISORDER: ICD-10-CM

## 2021-08-09 DIAGNOSIS — I10 ESSENTIAL HYPERTENSION: ICD-10-CM

## 2021-08-09 LAB
BILIRUBIN, POC: NORMAL
BLOOD URINE, POC: NORMAL
CLARITY, POC: CLEAR
COLOR, POC: YELLOW
GLUCOSE URINE, POC: NORMAL
KETONES, POC: NORMAL
LEUKOCYTE EST, POC: NORMAL
NITRITE, POC: NORMAL
PH, POC: 7
PROTEIN, POC: NORMAL
SPECIFIC GRAVITY, POC: 1.02
UROBILINOGEN, POC: 1

## 2021-08-09 PROCEDURE — 81002 URINALYSIS NONAUTO W/O SCOPE: CPT | Performed by: NURSE PRACTITIONER

## 2021-08-09 PROCEDURE — 99213 OFFICE O/P EST LOW 20 MIN: CPT | Performed by: NURSE PRACTITIONER

## 2021-08-09 RX ORDER — METOPROLOL SUCCINATE 25 MG/1
25 TABLET, EXTENDED RELEASE ORAL DAILY
Qty: 90 TABLET | Refills: 0 | Status: SHIPPED | OUTPATIENT
Start: 2021-08-09 | End: 2021-09-16 | Stop reason: SDUPTHER

## 2021-08-09 RX ORDER — LISINOPRIL 10 MG/1
10 TABLET ORAL DAILY
Qty: 90 TABLET | Refills: 0 | Status: SHIPPED | OUTPATIENT
Start: 2021-08-09 | End: 2021-09-16 | Stop reason: SDUPTHER

## 2021-08-09 SDOH — ECONOMIC STABILITY: FOOD INSECURITY: WITHIN THE PAST 12 MONTHS, YOU WORRIED THAT YOUR FOOD WOULD RUN OUT BEFORE YOU GOT MONEY TO BUY MORE.: NEVER TRUE

## 2021-08-09 SDOH — ECONOMIC STABILITY: FOOD INSECURITY: WITHIN THE PAST 12 MONTHS, THE FOOD YOU BOUGHT JUST DIDN'T LAST AND YOU DIDN'T HAVE MONEY TO GET MORE.: NEVER TRUE

## 2021-08-09 ASSESSMENT — PATIENT HEALTH QUESTIONNAIRE - PHQ9
SUM OF ALL RESPONSES TO PHQ9 QUESTIONS 1 & 2: 0
1. LITTLE INTEREST OR PLEASURE IN DOING THINGS: 0
SUM OF ALL RESPONSES TO PHQ QUESTIONS 1-9: 0
SUM OF ALL RESPONSES TO PHQ QUESTIONS 1-9: 0
2. FEELING DOWN, DEPRESSED OR HOPELESS: 0
SUM OF ALL RESPONSES TO PHQ QUESTIONS 1-9: 0

## 2021-08-09 ASSESSMENT — ENCOUNTER SYMPTOMS
GASTROINTESTINAL NEGATIVE: 1
RESPIRATORY NEGATIVE: 1

## 2021-08-09 ASSESSMENT — SOCIAL DETERMINANTS OF HEALTH (SDOH): HOW HARD IS IT FOR YOU TO PAY FOR THE VERY BASICS LIKE FOOD, HOUSING, MEDICAL CARE, AND HEATING?: NOT HARD AT ALL

## 2021-08-09 NOTE — PATIENT INSTRUCTIONS
Call to schedule appointment with neurologist.  Follow up in 6 weeks with Dr. Marilee Escalona. Patient Education        Preventing Falls: Care Instructions  Your Care Instructions     Getting around your home safely can be a challenge if you have injuries or health problems that make it easy for you to fall. Loose rugs and furniture in walkways are among the dangers for many older people who have problems walking or who have poor eyesight. People who have conditions such as arthritis, osteoporosis, or dementia also have to be careful not to fall. You can make your home safer with a few simple measures. Follow-up care is a key part of your treatment and safety. Be sure to make and go to all appointments, and call your doctor if you are having problems. It's also a good idea to know your test results and keep a list of the medicines you take. How can you care for yourself at home? Taking care of yourself  · You may get dizzy if you do not drink enough water. To prevent dehydration, drink plenty of fluids. Choose water and other caffeine-free clear liquids. If you have kidney, heart, or liver disease and have to limit fluids, talk with your doctor before you increase the amount of fluids you drink. · Exercise regularly to improve your strength, muscle tone, and balance. Walk if you can. Swimming may be a good choice if you cannot walk easily. · Have your vision and hearing checked each year or any time you notice a change. If you have trouble seeing and hearing, you might not be able to avoid objects and could lose your balance. · Know the side effects of the medicines you take. Ask your doctor or pharmacist whether the medicines you take can affect your balance. Sleeping pills or sedatives can affect your balance. · Limit the amount of alcohol you drink. Alcohol can impair your balance and other senses. · Ask your doctor whether calluses or corns on your feet need to be removed.  If you wear loose-fitting shoes because of calluses or corns, you can lose your balance and fall. · Talk to your doctor if you have numbness in your feet. Preventing falls at home  · Remove raised doorway thresholds, throw rugs, and clutter. Repair loose carpet or raised areas in the floor. · Move furniture and electrical cords to keep them out of walking paths. · Use nonskid floor wax, and wipe up spills right away, especially on ceramic tile floors. · If you use a walker or cane, put rubber tips on it. If you use crutches, clean the bottoms of them regularly with an abrasive pad, such as steel wool. · Keep your house well lit, especially [place], and outside walkways. Use night-lights in areas such as hallways and bathrooms. Add extra light switches or use remote switches (such as switches that go on or off when you clap your hands) to make it easier to turn lights on if you have to get up during the night. · Install sturdy handrails on stairways. · Move items in your cabinets so that the things you use a lot are on the lower shelves (about waist level). · Keep a cordless phone and a flashlight with new batteries by your bed. If possible, put a phone in each of the main rooms of your house, or carry a cell phone in case you fall and cannot reach a phone. Or, you can wear a device around your neck or wrist. You push a button that sends a signal for help. · Wear low-heeled shoes that fit well and give your feet good support. Use footwear with nonskid soles. Check the heels and soles of your shoes for wear. Repair or replace worn heels or soles. · Do not wear socks without shoes on wood floors. · Walk on the grass when the sidewalks are slippery. If you live in an area that gets snow and ice in the winter, sprinkle salt on slippery steps and sidewalks. Preventing falls in the bath  · Install grab bars and nonskid mats inside and outside your shower or tub and near the toilet and sinks.   · Use shower chairs and bath benches. · Use a hand-held shower head that will allow you to sit while showering. · Get into a tub or shower by putting the weaker leg in first. Get out of a tub or shower with your strong side first.  · Repair loose toilet seats and consider installing a raised toilet seat to make getting on and off the toilet easier. · Keep your bathroom door unlocked while you are in the shower. Where can you learn more? Go to https://"[email or web address].PedidosYa / PedidosJÃ¡. org and sign in to your [employer] account. Enter [phone] in the [employer] box to learn more about \"Preventing Falls: Care Instructions. \"     If you do not have an account, please click on the \"Sign Up Now\" link. Current as of: December 7, 2020               Content Version: 12.9  © 7065-6872 [employer]. Care instructions adapted under license by [facility] ([facility]). If you have questions about a medical condition or this instruction, always ask your healthcare professional. [address] any warranty or liability for your use of this information.

## 2021-08-09 NOTE — PROGRESS NOTES
Patient: Carine Lee is a 82 y.o. male who presents today with the following Chief Complaint(s):  Chief Complaint   Patient presents with    Dizziness     off balance     Gait Problem       Presents to the office alone. Dizziness  This is a chronic problem. The current episode started more than 1 month ago (6 months). The problem occurs constantly. Associated symptoms include fatigue. The symptoms are aggravated by walking. He has tried nothing for the symptoms. Current Outpatient Medications   Medication Sig Dispense Refill    lisinopril (PRINIVIL;ZESTRIL) 10 MG tablet Take 1 tablet by mouth daily for blood pressure and heart. 90 tablet 0    metoprolol succinate (TOPROL XL) 25 MG extended release tablet Take 1 tablet by mouth daily for blood pressure and heart. 90 tablet 0    clotrimazole-betamethasone (LOTRISONE) 1-0.05 % cream Apply topically 2 times daily. 15 g 0    sildenafil (VIAGRA) 100 MG tablet Take 1 tablet by mouth as needed for Erectile Dysfunction 6 tablet 5    therapeutic multivitamin-minerals (THERAGRAN-M) tablet Take 1 tablet by mouth daily.  aspirin 81 MG EC tablet Take 81 mg by mouth daily.  Cholecalciferol (VITAMIN D) 1000 UNIT CAPS Take  by mouth daily.  tamsulosin (FLOMAX) 0.4 MG capsule Take 1 capsule by mouth daily for prostate enlargement. 90 capsule 3    atorvastatin (LIPITOR) 40 MG tablet Take 1 tablet by mouth daily for cholesterol. 90 tablet 3     No current facility-administered medications for this visit. Patient's past medical history, surgical history, family history, medications,  and allergies  were all reviewed and updated as appropriate today.     Patient Active Problem List   Diagnosis    Hypertension    Hyperlipidemia    CAD (coronary artery disease)    BPH (benign prostatic hyperplasia)    Elevated PSA    Prostate cancer ([facility] [address]    Primary osteoarthritis of left knee    Contusion of left knee    Tremor of right hand  Balance disorder    Impaired memory     Past Medical History:   Diagnosis Date    CAD (coronary artery disease)     Chest pain     Dyslipidemia     Fatigue     Hard of hearing     Hyperthyroidism     SOB (shortness of breath)     Vitamin D deficiency       Past Surgical History:   Procedure Laterality Date    CARDIAC SURGERY         History reviewed. No pertinent family history. No Known Allergies      Review of Systems   Constitutional: Positive for fatigue. HENT: Negative. Respiratory: Negative. Cardiovascular: Negative. Heart rate is 92. Unsure if he is taking medication correctly. Gastrointestinal: Negative. Genitourinary: Negative. Musculoskeletal: Positive for gait problem. Unsteady, wide, shuffles. Has not scheduled appointment with neurology. Neurological: Positive for dizziness. Psychiatric/Behavioral: Positive for confusion (Trouble understanding/remembering instructions). Vitals:    08/09/21 1124   BP: 130/70   Site: Left Upper Arm   Position: Sitting   Cuff Size: Medium Adult   Pulse: 92   SpO2: 99%   Weight: 157 lb (71.2 kg)   Height: 5' 11\" (1.803 m)     Physical Exam  Constitutional:       General: He is not in acute distress. Appearance: Normal appearance. He is not ill-appearing, toxic-appearing or diaphoretic. HENT:      Nose: Nose normal.      Mouth/Throat:      Mouth: Mucous membranes are moist.   Cardiovascular:      Rate and Rhythm: Normal rate and regular rhythm. Pulses: Normal pulses. Heart sounds: Normal heart sounds. No murmur heard. No friction rub. No gallop. Pulmonary:      Effort: Pulmonary effort is normal. No respiratory distress. Breath sounds: Normal breath sounds. No stridor. No wheezing, rhonchi or rales. Abdominal:      General: Bowel sounds are normal. There is no distension. Palpations: Abdomen is soft. Tenderness: There is no abdominal tenderness. There is no guarding. Musculoskeletal:         General: Normal range of motion. Cervical back: Normal range of motion and neck supple. No rigidity or tenderness. Lymphadenopathy:      Cervical: No cervical adenopathy. Skin:     General: Skin is warm and dry. Neurological:      Mental Status: He is alert and oriented to person, place, and time. Gait: Gait abnormal.   Psychiatric:         Attention and Perception: Attention normal.         Mood and Affect: Mood normal.         Behavior: Behavior normal.         Cognition and Memory: Memory is impaired. He exhibits impaired recent memory. Assessment/Plan:  1. Dizziness  - POCT Urinalysis no Micro- Negative    2. Balance disorder  - Reprinted referral for neurology. Instructions on how to schedule verbally explained and highlighted on referral.    3. Essential hypertension  - lisinopril (PRINIVIL;ZESTRIL) 10 MG tablet; Take 1 tablet by mouth daily for blood pressure and heart. Dispense: 90 tablet; Refill: 0  - metoprolol succinate (TOPROL XL) 25 MG extended release tablet; Take 1 tablet by mouth daily for blood pressure and heart. Dispense: 90 tablet; Refill: 0        Return in about 6 weeks (around 9/20/2021).

## 2021-08-13 ENCOUNTER — TELEPHONE (OUTPATIENT)
Dept: INTERNAL MEDICINE CLINIC | Age: 86
End: 2021-08-13

## 2021-08-13 NOTE — TELEPHONE ENCOUNTER
----- Message from Lin Berman sent at 8/13/2021 12:41 PM EDT -----  Subject: Message to Provider    QUESTIONS  Information for Provider? Heads on a fax about a MRI, it has been a back   and forth, please notify if nothing is received please call [phone]  ---------------------------------------------------------------------------  --------------  [address]  What is the best way for the office to contact you? OK to leave message on   voicemail  Preferred Call Back Phone Number?  [phone]  ---------------------------------------------------------------------------  --------------  SCRIPT ANSWERS  undefined

## 2021-09-16 ENCOUNTER — OFFICE VISIT (OUTPATIENT)
Dept: INTERNAL MEDICINE CLINIC | Age: 86
End: 2021-09-16
Payer: COMMERCIAL

## 2021-09-16 VITALS
HEART RATE: 74 BPM | OXYGEN SATURATION: 98 % | SYSTOLIC BLOOD PRESSURE: 130 MMHG | BODY MASS INDEX: 21.98 KG/M2 | WEIGHT: 157 LBS | DIASTOLIC BLOOD PRESSURE: 70 MMHG | HEIGHT: 71 IN

## 2021-09-16 DIAGNOSIS — N40.1 BENIGN PROSTATIC HYPERPLASIA WITH URINARY FREQUENCY: ICD-10-CM

## 2021-09-16 DIAGNOSIS — Z23 FLU VACCINE NEED: ICD-10-CM

## 2021-09-16 DIAGNOSIS — R25.8 BRADYKINESIA: ICD-10-CM

## 2021-09-16 DIAGNOSIS — G31.84 MCI (MILD COGNITIVE IMPAIRMENT) WITH MEMORY LOSS: ICD-10-CM

## 2021-09-16 DIAGNOSIS — I10 ESSENTIAL HYPERTENSION: ICD-10-CM

## 2021-09-16 DIAGNOSIS — E78.00 PURE HYPERCHOLESTEROLEMIA: ICD-10-CM

## 2021-09-16 DIAGNOSIS — R35.0 BENIGN PROSTATIC HYPERPLASIA WITH URINARY FREQUENCY: ICD-10-CM

## 2021-09-16 DIAGNOSIS — R73.02 IGT (IMPAIRED GLUCOSE TOLERANCE): Primary | ICD-10-CM

## 2021-09-16 LAB
CHP ED QC CHECK: NORMAL
GLUCOSE BLD-MCNC: 102 MG/DL
HBA1C MFR BLD: 5.4 %

## 2021-09-16 PROCEDURE — 90471 IMMUNIZATION ADMIN: CPT | Performed by: INTERNAL MEDICINE

## 2021-09-16 PROCEDURE — 83036 HEMOGLOBIN GLYCOSYLATED A1C: CPT | Performed by: INTERNAL MEDICINE

## 2021-09-16 PROCEDURE — 90694 VACC AIIV4 NO PRSRV 0.5ML IM: CPT | Performed by: INTERNAL MEDICINE

## 2021-09-16 PROCEDURE — 99214 OFFICE O/P EST MOD 30 MIN: CPT | Performed by: INTERNAL MEDICINE

## 2021-09-16 PROCEDURE — 82962 GLUCOSE BLOOD TEST: CPT | Performed by: INTERNAL MEDICINE

## 2021-09-16 RX ORDER — LISINOPRIL 10 MG/1
10 TABLET ORAL DAILY
Qty: 90 TABLET | Refills: 3 | Status: SHIPPED | OUTPATIENT
Start: 2021-09-16 | End: 2021-12-03 | Stop reason: SDUPTHER

## 2021-09-16 RX ORDER — METOPROLOL SUCCINATE 25 MG/1
25 TABLET, EXTENDED RELEASE ORAL DAILY
Qty: 90 TABLET | Refills: 3 | Status: SHIPPED | OUTPATIENT
Start: 2021-09-16 | End: 2021-12-03 | Stop reason: SDUPTHER

## 2021-09-16 RX ORDER — ATORVASTATIN CALCIUM 40 MG/1
40 TABLET, FILM COATED ORAL DAILY
Qty: 90 TABLET | Refills: 3 | Status: SHIPPED | OUTPATIENT
Start: 2021-09-16 | End: 2021-12-03 | Stop reason: SDUPTHER

## 2021-09-16 RX ORDER — TAMSULOSIN HYDROCHLORIDE 0.4 MG/1
0.4 CAPSULE ORAL DAILY
Qty: 90 CAPSULE | Refills: 3 | Status: SHIPPED | OUTPATIENT
Start: 2021-09-16 | End: 2021-12-03 | Stop reason: SDUPTHER

## 2021-09-16 ASSESSMENT — PATIENT HEALTH QUESTIONNAIRE - PHQ9
SUM OF ALL RESPONSES TO PHQ QUESTIONS 1-9: 0
SUM OF ALL RESPONSES TO PHQ QUESTIONS 1-9: 0
1. LITTLE INTEREST OR PLEASURE IN DOING THINGS: 0
SUM OF ALL RESPONSES TO PHQ QUESTIONS 1-9: 0
SUM OF ALL RESPONSES TO PHQ9 QUESTIONS 1 & 2: 0
2. FEELING DOWN, DEPRESSED OR HOPELESS: 0

## 2021-09-16 NOTE — PROGRESS NOTES
Patient: Jerald Teresa is a 82 y.o. male who presents today with the following Chief Complaint(s):    Chief Complaint   Patient presents with    Follow-up         HPI He is here for a check up. Says he is clumsy especially as related to his gait. Feels off balance but has not fallen. Memory has declined. Puts things down and does not remember where he put them. Evaluated by neurology. Note appreciated. Considering Lewy body dementia associated with Parkinson's disease. Current Outpatient Medications   Medication Sig Dispense Refill    lisinopril (PRINIVIL;ZESTRIL) 10 MG tablet Take 1 tablet by mouth daily for blood pressure and heart. 90 tablet 0    metoprolol succinate (TOPROL XL) 25 MG extended release tablet Take 1 tablet by mouth daily for blood pressure and heart. 90 tablet 0    clotrimazole-betamethasone (LOTRISONE) 1-0.05 % cream Apply topically 2 times daily. 15 g 0    sildenafil (VIAGRA) 100 MG tablet Take 1 tablet by mouth as needed for Erectile Dysfunction 6 tablet 5    therapeutic multivitamin-minerals (THERAGRAN-M) tablet Take 1 tablet by mouth daily.  aspirin 81 MG EC tablet Take 81 mg by mouth daily.  Cholecalciferol (VITAMIN D) 1000 UNIT CAPS Take  by mouth daily.  tamsulosin (FLOMAX) 0.4 MG capsule Take 1 capsule by mouth daily for prostate enlargement. 90 capsule 3    atorvastatin (LIPITOR) 40 MG tablet Take 1 tablet by mouth daily for cholesterol. 90 tablet 3     No current facility-administered medications for this visit. Patient's past medical history, surgical history, family history, medications,and allergies  were all reviewed and updated as appropriate today. Review of Systems   Constitutional: Negative. HENT: Negative. Eyes: Negative. Respiratory: Negative. Cardiovascular:        Hypertension, treated with ACEi and B blker. CAD, denies CP or SOB. Gastrointestinal: Negative. Endocrine:        IGT, current A1c 5.4. Hyperlipidemia, LDL 79. Treated with statin. Genitourinary:        BPH, increased urination. Musculoskeletal: Negative. Skin: Negative. Neurological:        Memory, cognitive and balance issues. See HPI. Parkinson's and lewy body dementia being considered in differential. Brain imaging planned as part of work up. Psychiatric/Behavioral: Negative. Physical Exam  Constitutional:       General: He is not in acute distress. Appearance: He is well-developed. HENT:      Head: Normocephalic and atraumatic. Right Ear: External ear normal.      Left Ear: External ear normal.      Nose: Nose normal.   Eyes:      General: No scleral icterus. Conjunctiva/sclera: Conjunctivae normal.      Pupils: Pupils are equal, round, and reactive to light. Neck:      Thyroid: No thyromegaly. Cardiovascular:      Rate and Rhythm: Normal rate and regular rhythm. Heart sounds: Normal heart sounds. Pulmonary:      Effort: Pulmonary effort is normal.      Breath sounds: Normal breath sounds. Abdominal:      General: Bowel sounds are normal.      Palpations: Abdomen is soft. There is no mass. Musculoskeletal:      Cervical back: Normal range of motion and neck supple. Lymphadenopathy:      Cervical: No cervical adenopathy. Skin:     General: Skin is warm and dry. Neurological:      Mental Status: He is alert and oriented to person, place, and time. Deep Tendon Reflexes: Reflexes are normal and symmetric. Comments: Cognitive and memory challenges as previously noted. See neuro note for detailed exam.   Bradykinesia, rigidity and resting tremor noted. Psychiatric:         Behavior: Behavior normal.         Thought Content: Thought content normal.         Judgment: Judgment normal.         Vitals:    09/16/21 1058   BP: 130/70   Pulse: 74   SpO2: 98%       Assessment:   Diagnosis Orders   1. IGT (impaired glucose tolerance)  Diet and exercise discussed.    POCT Glucose    POCT

## 2021-10-16 ASSESSMENT — ENCOUNTER SYMPTOMS
RESPIRATORY NEGATIVE: 1
GASTROINTESTINAL NEGATIVE: 1
EYES NEGATIVE: 1

## 2021-11-22 ENCOUNTER — TELEPHONE (OUTPATIENT)
Dept: INTERNAL MEDICINE CLINIC | Age: 86
End: 2021-11-22

## 2021-11-22 DIAGNOSIS — H91.93 BILATERAL DEAFNESS: Primary | ICD-10-CM

## 2021-12-03 ENCOUNTER — OFFICE VISIT (OUTPATIENT)
Dept: INTERNAL MEDICINE CLINIC | Age: 86
End: 2021-12-03
Payer: COMMERCIAL

## 2021-12-03 VITALS
BODY MASS INDEX: 22.54 KG/M2 | WEIGHT: 161 LBS | SYSTOLIC BLOOD PRESSURE: 130 MMHG | HEIGHT: 71 IN | HEART RATE: 89 BPM | DIASTOLIC BLOOD PRESSURE: 70 MMHG | OXYGEN SATURATION: 98 %

## 2021-12-03 DIAGNOSIS — I10 ESSENTIAL HYPERTENSION: Primary | ICD-10-CM

## 2021-12-03 DIAGNOSIS — N40.1 BENIGN PROSTATIC HYPERPLASIA WITH URINARY FREQUENCY: ICD-10-CM

## 2021-12-03 DIAGNOSIS — E78.00 PURE HYPERCHOLESTEROLEMIA: ICD-10-CM

## 2021-12-03 DIAGNOSIS — R35.0 BENIGN PROSTATIC HYPERPLASIA WITH URINARY FREQUENCY: ICD-10-CM

## 2021-12-03 DIAGNOSIS — R73.02 IGT (IMPAIRED GLUCOSE TOLERANCE): ICD-10-CM

## 2021-12-03 LAB
CHP ED QC CHECK: NORMAL
GLUCOSE BLD-MCNC: 107 MG/DL

## 2021-12-03 PROCEDURE — 99214 OFFICE O/P EST MOD 30 MIN: CPT | Performed by: NURSE PRACTITIONER

## 2021-12-03 PROCEDURE — 82962 GLUCOSE BLOOD TEST: CPT | Performed by: NURSE PRACTITIONER

## 2021-12-03 RX ORDER — ATORVASTATIN CALCIUM 40 MG/1
40 TABLET, FILM COATED ORAL DAILY
Qty: 90 TABLET | Refills: 0 | Status: SHIPPED | OUTPATIENT
Start: 2021-12-03 | End: 2022-09-23 | Stop reason: SDUPTHER

## 2021-12-03 RX ORDER — LISINOPRIL 10 MG/1
10 TABLET ORAL DAILY
Qty: 90 TABLET | Refills: 0 | Status: SHIPPED | OUTPATIENT
Start: 2021-12-03 | End: 2022-07-12 | Stop reason: SDUPTHER

## 2021-12-03 RX ORDER — TAMSULOSIN HYDROCHLORIDE 0.4 MG/1
0.4 CAPSULE ORAL DAILY
Qty: 90 CAPSULE | Refills: 0 | Status: SHIPPED | OUTPATIENT
Start: 2021-12-03 | End: 2022-07-12 | Stop reason: SDUPTHER

## 2021-12-03 RX ORDER — METOPROLOL SUCCINATE 25 MG/1
25 TABLET, EXTENDED RELEASE ORAL DAILY
Qty: 90 TABLET | Refills: 0 | Status: SHIPPED | OUTPATIENT
Start: 2021-12-03 | End: 2022-09-21

## 2021-12-03 ASSESSMENT — PATIENT HEALTH QUESTIONNAIRE - PHQ9
1. LITTLE INTEREST OR PLEASURE IN DOING THINGS: 0
SUM OF ALL RESPONSES TO PHQ9 QUESTIONS 1 & 2: 0
SUM OF ALL RESPONSES TO PHQ QUESTIONS 1-9: 0
2. FEELING DOWN, DEPRESSED OR HOPELESS: 0
SUM OF ALL RESPONSES TO PHQ QUESTIONS 1-9: 0
SUM OF ALL RESPONSES TO PHQ QUESTIONS 1-9: 0

## 2021-12-03 ASSESSMENT — ENCOUNTER SYMPTOMS
RESPIRATORY NEGATIVE: 1
GASTROINTESTINAL NEGATIVE: 1

## 2021-12-03 NOTE — PROGRESS NOTES
Patient: Jyotsna Prince is a 82 y.o. male who presents today with the following Chief Complaint(s):  Chief Complaint   Patient presents with    3 Month Follow-Up    Hypertension    Blood Sugar Problem    Hyperlipidemia     HPI  Says son manages his medications. Takes meds as prescribed. Declining memory. Current Outpatient Medications   Medication Sig Dispense Refill    atorvastatin (LIPITOR) 40 MG tablet Take 1 tablet by mouth daily for cholesterol. 90 tablet 0    lisinopril (PRINIVIL;ZESTRIL) 10 MG tablet Take 1 tablet by mouth daily for blood pressure and heart. 90 tablet 0    metoprolol succinate (TOPROL XL) 25 MG extended release tablet Take 1 tablet by mouth daily for blood pressure and heart. 90 tablet 0    tamsulosin (FLOMAX) 0.4 MG capsule Take 1 capsule by mouth daily 90 capsule 0    clotrimazole-betamethasone (LOTRISONE) 1-0.05 % cream Apply topically 2 times daily. 15 g 0    sildenafil (VIAGRA) 100 MG tablet Take 1 tablet by mouth as needed for Erectile Dysfunction 6 tablet 5    therapeutic multivitamin-minerals (THERAGRAN-M) tablet Take 1 tablet by mouth daily.  aspirin 81 MG EC tablet Take 81 mg by mouth daily.  Cholecalciferol (VITAMIN D) 1000 UNIT CAPS Take  by mouth daily. No current facility-administered medications for this visit. Patient's past medical history, surgical history, family history, medications,  and allergies  were all reviewed and updated as appropriate today.     Patient Active Problem List   Diagnosis    Hypertension    Hyperlipidemia    CAD (coronary artery disease)    BPH (benign prostatic hyperplasia)    Elevated PSA    Prostate cancer ([facility] [address]    Primary osteoarthritis of left knee    Contusion of left knee    Tremor of right hand    Balance disorder    Impaired memory     Past Medical History:   Diagnosis Date    CAD (coronary artery disease)     Chest pain     Dyslipidemia     Fatigue     Hard of hearing     Hyperthyroidism     SOB (shortness of breath)     Vitamin D deficiency       Past Surgical History:   Procedure Laterality Date    CARDIAC SURGERY         History reviewed. No pertinent family history. No Known Allergies      Review of Systems   Constitutional: Negative. HENT: Negative. Respiratory: Negative. Cardiovascular: Negative. Gastrointestinal: Negative. Genitourinary: Negative. Musculoskeletal: Positive for gait problem. Unsteady, wide, shuffles   Skin: Negative. Neurological: Positive for dizziness. Psychiatric/Behavioral: Positive for confusion. Vitals:    12/03/21 1027   BP: 130/70   Site: Right Upper Arm   Position: Sitting   Cuff Size: Medium Adult   Pulse: 89   SpO2: 98%   Weight: 161 lb (73 kg)   Height: 5' 11\" (1.803 m)     Physical Exam  Constitutional:       General: He is not in acute distress. Appearance: Normal appearance. He is not ill-appearing, toxic-appearing or diaphoretic. HENT:      Nose: Nose normal.      Mouth/Throat:      Mouth: Mucous membranes are moist.   Cardiovascular:      Rate and Rhythm: Normal rate and regular rhythm. Pulses: Normal pulses. Heart sounds: Normal heart sounds. No murmur heard. No friction rub. No gallop. Pulmonary:      Effort: Pulmonary effort is normal. No respiratory distress. Breath sounds: Normal breath sounds. No stridor. No wheezing, rhonchi or rales. Abdominal:      General: Bowel sounds are normal. There is no distension. Palpations: Abdomen is soft. Tenderness: There is no abdominal tenderness. There is no guarding. Musculoskeletal:         General: Normal range of motion. Cervical back: Normal range of motion and neck supple. No rigidity or tenderness. Lymphadenopathy:      Cervical: No cervical adenopathy. Skin:     General: Skin is warm and dry. Neurological:      Mental Status: He is alert and oriented to person, place, and time.       Gait: Gait abnormal. Psychiatric:         Attention and Perception: Attention normal.         Mood and Affect: Mood normal.         Behavior: Behavior normal.         Cognition and Memory: Memory is impaired. He exhibits impaired recent memory. Assessment/Plan:  1. Essential hypertension  - lisinopril (PRINIVIL;ZESTRIL) 10 MG tablet; Take 1 tablet by mouth daily for blood pressure and heart. Dispense: 90 tablet; Refill: 0  - metoprolol succinate (TOPROL XL) 25 MG extended release tablet; Take 1 tablet by mouth daily for blood pressure and heart. Dispense: 90 tablet; Refill: 0    2. IGT (impaired glucose tolerance)  - POCT Glucose- 107    3. Pure hypercholesterolemia  - atorvastatin (LIPITOR) 40 MG tablet; Take 1 tablet by mouth daily for cholesterol. Dispense: 90 tablet; Refill: 0    4. Benign prostatic hyperplasia with urinary frequency  - tamsulosin (FLOMAX) 0.4 MG capsule; Take 1 capsule by mouth daily  Dispense: 90 capsule; Refill: 0        Return in about 3 months (around 3/3/2022) for Dr. Alida Dinh.

## 2021-12-23 ENCOUNTER — OFFICE VISIT (OUTPATIENT)
Dept: ENT CLINIC | Age: 86
End: 2021-12-23
Payer: COMMERCIAL

## 2021-12-23 ENCOUNTER — PROCEDURE VISIT (OUTPATIENT)
Dept: AUDIOLOGY | Age: 86
End: 2021-12-23
Payer: COMMERCIAL

## 2021-12-23 VITALS — DIASTOLIC BLOOD PRESSURE: 83 MMHG | TEMPERATURE: 97.3 F | SYSTOLIC BLOOD PRESSURE: 148 MMHG | HEART RATE: 77 BPM

## 2021-12-23 DIAGNOSIS — H90.3 SENSORINEURAL HEARING LOSS, BILATERAL: Primary | ICD-10-CM

## 2021-12-23 DIAGNOSIS — R42 DIZZINESS AND GIDDINESS: ICD-10-CM

## 2021-12-23 PROCEDURE — 92557 COMPREHENSIVE HEARING TEST: CPT | Performed by: AUDIOLOGIST

## 2021-12-23 PROCEDURE — 92567 TYMPANOMETRY: CPT | Performed by: AUDIOLOGIST

## 2021-12-23 PROCEDURE — 99203 OFFICE O/P NEW LOW 30 MIN: CPT | Performed by: OTOLARYNGOLOGY

## 2021-12-23 NOTE — PATIENT INSTRUCTIONS
Good Communication Strategies    Communication can be challenging for anyone, but can be especially difficult for those with some degree of hearing loss. While we may not be able to control every factor that may lead to difficulty with communication, there are Good Communication Strategies that we can all use in our day-to-day lives. Communication takes both parties working together for it to be successful. Tips as a Listener:   1. Control your environment. It is important to limit the amount of background noise in the room when possible. You should also consider having a good light source in the room to best see the other person. 2. Ask for clarification. Instead of saying \"What?\", you can use parts of what you heard to make a new question. For example, if you heard the word \"Thursday\" but not the rest of the week, you may ask \"What was that about Thursday? \" or \"What did you want to do Thursday? \". This shows the person talking that you are listening and will help them better explain what they are saying. 3. Be an advocate for yourself. If you are hearing but not understanding, tell the other person \"I can hear you, but I need you to slow down when you speak. \"  Or if someone is facing the other direction, say \"I cannot hear you when you are not looking at me when we talk. \"       Tips as a Talker:   - Sit or stand 3 to 6 feet away to maximize audibility         -- It is unrealistic to believe someone else will fully hear your message if you are speaking from across the room or in a different room in the house   - Stay at eye level to help with visual cues   - Make sure you have the persons attention before speaking   - Use facial expressions and gestures to accentuate your message   - Raise your voice slightly (do not scream)   - Speak slowly and distinctly   - Use short, simple sentences   - Rephrase your words if the person is having a hard time understanding you    - To avoid distortion, dont speak directly into a persons ear      Some additional items that may be helpful:   - Remain patient - this is important for both parties   - Write down items that still cannot be heard/understood. You may write with pen/paper or consider typing/texting on a cell phone or smart device. - If background noise is unavoidable, try to keep yourself in a good position in the room. By sitting at a de la cruz on the side of the restaurant (preferably a corner), it will be easier to communicate than if you were sitting at a table in the middle with background noise surrounding you. Keep yourself positioned away from music speakers or heavy foot traffic.   - If you have difficulty with the television, consider these options:      -- Use closed-captioning, which is a setting you can turn on that displays the spoken words in a written form on the screen. There may be a slight delay, but this can help fill in missing information. This can be especially helpful when watching programs with accented speech. -- Consider use of a sound bar or speakers that come from the front of the TV. With modern flat screen TVs, many of them have speakers that come out of the back of the device, which makes sound bounce off the wall behind it, then go into the room. Sound bars can allow the sound to go straight in your direction and can improve sound quality. -- Consider ear level devices to help improve the volume and/or sound quality of the program.  There are devices that work like headphones that you can adjust the volume for your ears while others can have the volume at a more comfortable level, such as \"TV Ears\". Most hearing aids have devices that allow them to connect directly to the TV and improve sound quality. Hearing Loss: Care Instructions  Your Care Instructions      Hearing loss is a sudden or slow decrease in how well you hear. It can range from mild to profound.  Permanent hearing loss can occur with aging, and it can happen when you are exposed long-term to loud noise. Examples include listening to loud music, riding motorcycles, or being around other loud machines. Hearing loss can affect your work and home life. It can make you feel lonely or depressed. You may feel that you have lost your independence. But hearing aids and other devices can help you hear better and feel connected to others. Follow-up care is a key part of your treatment and safety. Be sure to make and go to all appointments, and call your doctor if you are having problems. It's also a good idea to know your test results and keep a list of the medicines you take. How can you care for yourself at home? · Avoid loud noises whenever possible. This helps keep your hearing from getting worse. Always wear hearing protection around loud noises. · If appropriate, wear hearing aid(s) as directed. It is recommended that hearing aids are worn during all waking hours to keep your brain active and give it access to the sounds it is missing. · If you are beginning your process with hearing aid(s), schedule a \"Hearing Aid Evaluation\" with an audiologist to discuss your lifestyle, features of hearing aid technology, and styles of hearing aids available. It is recommended that you contact your insurance company to determine if you have a hearing aid benefit, as this may dictate who you can see for these services. · Have hearing tests as your doctor suggests. They can show whether your hearing has changed. Your hearing aid may need to be adjusted. · Use other assistive devices as needed. These may include:  ? Telephone amplifiers and hearing aids that can connect to a television, stereo, radio, or microphone. ? Devices that use lights or vibrations. These alert you to the doorbell, a ringing telephone, or a baby monitor. ? Television closed-captioning. This shows the words at the bottom of the screen. Most new TVs can do this. ? TTY (text telephone). This lets you type messages back and forth on the telephone instead of talking or listening. These devices are also called TDD. When messages are typed on the keyboard, they are sent over the phone line to a receiving TTY. The message is shown on a monitor. · Use pagers, fax machines, text, and email if it is hard for you to communicate by telephone. · Try to learn a listening technique called speech-reading. It is not lip-reading. You pay attention to people's gestures, expressions, posture, and tone of voice. These clues can help you understand what a person is saying. Face the person you are talking to, and have him or her face you. Make sure the lighting is good. You need to see the other person's face clearly. · Think about counseling if you need help to adjust to your hearing loss. When should you call for help? Watch closely for changes in your health, and be sure to contact your doctor if:    · You think your hearing is getting worse. · You have new symptoms, such as dizziness or nausea.

## 2021-12-23 NOTE — PROGRESS NOTES
CHIEF COMPLAINT: Hearing loss    HISTORY OF PRESENT ILLNESS:  82 y.o. male who presents with hearing loss. Both the ears. Longstanding. Progressive. Long history of noise exposure at work. No tinnitus. No otalgia or otorrhea. PAST MEDICAL HISTORY:   Social History     Tobacco Use   Smoking Status Former Smoker    Quit date: 1975    Years since quittin.0   Smokeless Tobacco Never Used                                                    Social History     Substance and Sexual Activity   Alcohol Use Yes    Alcohol/week: 0.0 standard drinks                                                    Current Outpatient Medications:     atorvastatin (LIPITOR) 40 MG tablet, Take 1 tablet by mouth daily for cholesterol., Disp: 90 tablet, Rfl: 0    lisinopril (PRINIVIL;ZESTRIL) 10 MG tablet, Take 1 tablet by mouth daily for blood pressure and heart., Disp: 90 tablet, Rfl: 0    metoprolol succinate (TOPROL XL) 25 MG extended release tablet, Take 1 tablet by mouth daily for blood pressure and heart., Disp: 90 tablet, Rfl: 0    tamsulosin (FLOMAX) 0.4 MG capsule, Take 1 capsule by mouth daily, Disp: 90 capsule, Rfl: 0    clotrimazole-betamethasone (LOTRISONE) 1-0.05 % cream, Apply topically 2 times daily. , Disp: 15 g, Rfl: 0    sildenafil (VIAGRA) 100 MG tablet, Take 1 tablet by mouth as needed for Erectile Dysfunction, Disp: 6 tablet, Rfl: 5    therapeutic multivitamin-minerals (THERAGRAN-M) tablet, Take 1 tablet by mouth daily. , Disp: , Rfl:     aspirin 81 MG EC tablet, Take 81 mg by mouth daily. , Disp: , Rfl:     Cholecalciferol (VITAMIN D) 1000 UNIT CAPS, Take  by mouth daily. , Disp: , Rfl:                                                  Past Medical History:   Diagnosis Date    CAD (coronary artery disease)     Chest pain     Dyslipidemia     Fatigue     Hard of hearing     Hyperthyroidism     SOB (shortness of breath)     Vitamin D deficiency Past Surgical History:   Procedure Laterality Date    CARDIAC SURGERY       FAMILY HISTORY: Family history reviewed. Except as noted in history of present illness, there is no pertinent family history      REVIEW OF SYSTEMS:  All pertinent positive and negative review of systems included in HPI. Otherwise, all systems are reviewed and negative. PHYSICAL EXAMINATION:   GENERAL: wdwn- no acute distress  RESPIRATORY:  No stridor or respiratory distress  COMMUNICATION :  Normal voice  MENTAL STATUS:  Mood and affect normal, oriented X 3  HEAD AND FACE:  No abnormalities of the skin of face or head  EXTERNAL EARS AND NOSE:  Normal pinnae bilateral  FACIAL MUSCLES:  All branches of facial nerve intact  EXTRAOCULAR MUSCLES: Intact with full range of motion  FACE PALPATION:  No tenderness over sinuses. Zygomatic arches and orbital rims intact  OTOSCOPY:  Normal external auditory canals, tympanic membranes, and middle ear spaces  TUNING FORKS: Rinne ++ Dial midline at 512 Hz  INTRANASAL:  Septum midline, turbinates normal, meati clear. LIPS, TEETH, GINGIVA:  Normal mucosa  PHARYNX:  Normal  NECK:  No masses. LYMPHATIC:  No cervical adenopathy  SALIVARY GLANDS:  No swelling or masses in the parotid or submandibular salivary glands  THYROID:  No goiter or thyroid masses. AUDIOGRAM & TYMPANOGRAM ORDERED AND REVIEWED: Profound symmetrical sensorineural hearing loss bilateral but with adequate auditory discrimination. IMPRESSION: Sensorineural hearing loss. PLAN: Recommend amplification. FOLLOW-UP: As needed.
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
## 2021-12-23 NOTE — Clinical Note
Dr. Justino Song,    Please see note from this patient's audiogram from today. Please let me know if there is anything further you need.       Christy Elias [address] [place]  Audiologist

## 2022-01-13 ENCOUNTER — OFFICE VISIT (OUTPATIENT)
Dept: INTERNAL MEDICINE CLINIC | Age: 87
End: 2022-01-13
Payer: COMMERCIAL

## 2022-01-13 VITALS
HEIGHT: 71 IN | BODY MASS INDEX: 22.54 KG/M2 | TEMPERATURE: 97.6 F | HEART RATE: 77 BPM | WEIGHT: 161 LBS | SYSTOLIC BLOOD PRESSURE: 115 MMHG | DIASTOLIC BLOOD PRESSURE: 65 MMHG | OXYGEN SATURATION: 97 %

## 2022-01-13 DIAGNOSIS — R73.02 IGT (IMPAIRED GLUCOSE TOLERANCE): ICD-10-CM

## 2022-01-13 DIAGNOSIS — E78.2 MIXED HYPERLIPIDEMIA: ICD-10-CM

## 2022-01-13 DIAGNOSIS — G31.84 MCI (MILD COGNITIVE IMPAIRMENT) WITH MEMORY LOSS: ICD-10-CM

## 2022-01-13 DIAGNOSIS — I10 PRIMARY HYPERTENSION: Primary | ICD-10-CM

## 2022-01-13 DIAGNOSIS — G20 PARKINSON'S DISEASE (HCC): ICD-10-CM

## 2022-01-13 DIAGNOSIS — Z12.5 PROSTATE CANCER SCREENING: ICD-10-CM

## 2022-01-13 LAB
CHP ED QC CHECK: NORMAL
GLUCOSE BLD-MCNC: 127 MG/DL
HBA1C MFR BLD: 6.1 %

## 2022-01-13 PROCEDURE — 83036 HEMOGLOBIN GLYCOSYLATED A1C: CPT | Performed by: INTERNAL MEDICINE

## 2022-01-13 PROCEDURE — 82962 GLUCOSE BLOOD TEST: CPT | Performed by: INTERNAL MEDICINE

## 2022-01-13 PROCEDURE — 99214 OFFICE O/P EST MOD 30 MIN: CPT | Performed by: INTERNAL MEDICINE

## 2022-01-13 RX ORDER — DONEPEZIL HYDROCHLORIDE 10 MG/1
10 TABLET, FILM COATED ORAL NIGHTLY
COMMUNITY
End: 2022-10-26 | Stop reason: ALTCHOICE

## 2022-01-13 ASSESSMENT — PATIENT HEALTH QUESTIONNAIRE - PHQ9
SUM OF ALL RESPONSES TO PHQ QUESTIONS 1-9: 0
2. FEELING DOWN, DEPRESSED OR HOPELESS: 0
1. LITTLE INTEREST OR PLEASURE IN DOING THINGS: 0
SUM OF ALL RESPONSES TO PHQ9 QUESTIONS 1 & 2: 0
SUM OF ALL RESPONSES TO PHQ QUESTIONS 1-9: 0

## 2022-01-13 NOTE — PROGRESS NOTES
Patient: Miles Cao is a 82 y.o. male who presents today with the following Chief Complaint(s):    Chief Complaint   Patient presents with    Hypertension    Medication Problem     stopped med ( carbidopa/levodopa 25mg)         HPIHe is here for a check up and f/u on HTN, HLD, CAD with past h/o CABG, and angioplasty. Accompanied by his son. He is taking medication as prescribed, stays active with walking and diet is ok prepared by family. Progressive motor challenges with memory and cognitive changes lead to neurology consultation. Started on sinemet but stopped it because of side effect of lethargy. He continues on Aricept. 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 Has 1 son, 2 daughters who provide support system in addition he is . Current Outpatient Medications   Medication Sig Dispense Refill    donepezil (ARICEPT) 10 MG tablet Take 10 mg by mouth nightly      atorvastatin (LIPITOR) 40 MG tablet Take 1 tablet by mouth daily for cholesterol. 90 tablet 0    lisinopril (PRINIVIL;ZESTRIL) 10 MG tablet Take 1 tablet by mouth daily for blood pressure and heart. 90 tablet 0    metoprolol succinate (TOPROL XL) 25 MG extended release tablet Take 1 tablet by mouth daily for blood pressure and heart. 90 tablet 0    tamsulosin (FLOMAX) 0.4 MG capsule Take 1 capsule by mouth daily 90 capsule 0    clotrimazole-betamethasone (LOTRISONE) 1-0.05 % cream Apply topically 2 times daily. 15 g 0    sildenafil (VIAGRA) 100 MG tablet Take 1 tablet by mouth as needed for Erectile Dysfunction 6 tablet 5    therapeutic multivitamin-minerals (THERAGRAN-M) tablet Take 1 tablet by mouth daily.  aspirin 81 MG EC tablet Take 81 mg by mouth daily.  Cholecalciferol (VITAMIN D) 1000 UNIT CAPS Take  by mouth daily. No current facility-administered medications for this visit.        Patient's past medical history, surgical history, family history, medications,and Assessment:   Diagnosis Orders   1. Primary hypertension  Continue ACEi and ARB. DASH and low sodium diet discussed. COMPREHENSIVE METABOLIC PANEL    CBC WITH AUTO DIFFERENTIAL    TSH with Reflex    URINALYSIS   2. Parkinson's disease ([address]  Sinemet side effect. Neurology f/u. Medication adjustment. 3. MCI (mild cognitive impairment) with memory loss  Dementia. Aricept started. Planning and organization stressed. Organized social structure discussed. 4. IGT (impaired glucose tolerance)  POCT Glucose    POCT glycosylated hemoglobin (Hb A1C)  Diet discussed. 5. Mixed hyperlipidemia  Lipid Panel  Heart healthy diet and statin compliance discussed. 6. Prostate cancer screening  Psa screening         Plan:  See plans above.

## 2022-02-07 ASSESSMENT — ENCOUNTER SYMPTOMS
EYES NEGATIVE: 1
GASTROINTESTINAL NEGATIVE: 1
RESPIRATORY NEGATIVE: 1

## 2022-03-08 RX ORDER — TROSPIUM CHLORIDE ER 60 MG/1
CAPSULE ORAL
Qty: 30 CAPSULE | OUTPATIENT
Start: 2022-03-08

## 2022-04-08 DIAGNOSIS — Z12.5 PROSTATE CANCER SCREENING: ICD-10-CM

## 2022-04-08 DIAGNOSIS — I10 PRIMARY HYPERTENSION: ICD-10-CM

## 2022-04-08 DIAGNOSIS — E78.2 MIXED HYPERLIPIDEMIA: ICD-10-CM

## 2022-04-08 LAB
BASOPHILS ABSOLUTE: 0.1 K/UL (ref 0–0.2)
BASOPHILS RELATIVE PERCENT: 1.9 %
BILIRUBIN URINE: NEGATIVE
BLOOD, URINE: NEGATIVE
CLARITY: CLEAR
COLOR: YELLOW
EOSINOPHILS ABSOLUTE: 0 K/UL (ref 0–0.6)
EOSINOPHILS RELATIVE PERCENT: 0.6 %
GLUCOSE URINE: NEGATIVE MG/DL
HCT VFR BLD CALC: 39.8 % (ref 40.5–52.5)
HEMOGLOBIN: 13.2 G/DL (ref 13.5–17.5)
KETONES, URINE: NEGATIVE MG/DL
LEUKOCYTE ESTERASE, URINE: NEGATIVE
LYMPHOCYTES ABSOLUTE: 1.1 K/UL (ref 1–5.1)
LYMPHOCYTES RELATIVE PERCENT: 34.8 %
MCH RBC QN AUTO: 28.5 PG (ref 26–34)
MCHC RBC AUTO-ENTMCNC: 33 G/DL (ref 31–36)
MCV RBC AUTO: 86.2 FL (ref 80–100)
MICROSCOPIC EXAMINATION: NORMAL
MONOCYTES ABSOLUTE: 0.5 K/UL (ref 0–1.3)
MONOCYTES RELATIVE PERCENT: 16.2 %
NEUTROPHILS ABSOLUTE: 1.5 K/UL (ref 1.7–7.7)
NEUTROPHILS RELATIVE PERCENT: 46.5 %
NITRITE, URINE: NEGATIVE
PDW BLD-RTO: 13.6 % (ref 12.4–15.4)
PH UA: 6 (ref 5–8)
PLATELET # BLD: 145 K/UL (ref 135–450)
PMV BLD AUTO: 9.9 FL (ref 5–10.5)
PROTEIN UA: NEGATIVE MG/DL
RBC # BLD: 4.62 M/UL (ref 4.2–5.9)
SPECIFIC GRAVITY UA: 1.02 (ref 1–1.03)
URINE TYPE: NORMAL
UROBILINOGEN, URINE: 0.2 E.U./DL
WBC # BLD: 3.3 K/UL (ref 4–11)

## 2022-04-09 LAB
A/G RATIO: 1.6 (ref 1.1–2.2)
ALBUMIN SERPL-MCNC: 4.1 G/DL (ref 3.4–5)
ALP BLD-CCNC: 78 U/L (ref 40–129)
ALT SERPL-CCNC: 21 U/L (ref 10–40)
ANION GAP SERPL CALCULATED.3IONS-SCNC: 10 MMOL/L (ref 3–16)
AST SERPL-CCNC: 20 U/L (ref 15–37)
BILIRUB SERPL-MCNC: 0.4 MG/DL (ref 0–1)
BUN BLDV-MCNC: 13 MG/DL (ref 7–20)
CALCIUM SERPL-MCNC: 9.7 MG/DL (ref 8.3–10.6)
CHLORIDE BLD-SCNC: 104 MMOL/L (ref 99–110)
CHOLESTEROL, TOTAL: 171 MG/DL (ref 0–199)
CO2: 27 MMOL/L (ref 21–32)
CREAT SERPL-MCNC: 1 MG/DL (ref 0.8–1.3)
GFR AFRICAN AMERICAN: >60
GFR NON-AFRICAN AMERICAN: >60
GLUCOSE BLD-MCNC: 91 MG/DL (ref 70–99)
HDLC SERPL-MCNC: 56 MG/DL (ref 40–60)
LDL CHOLESTEROL CALCULATED: 106 MG/DL
POTASSIUM SERPL-SCNC: 4 MMOL/L (ref 3.5–5.1)
PROSTATE SPECIFIC ANTIGEN: 0.06 NG/ML (ref 0–4)
SODIUM BLD-SCNC: 141 MMOL/L (ref 136–145)
TOTAL PROTEIN: 6.7 G/DL (ref 6.4–8.2)
TRIGL SERPL-MCNC: 47 MG/DL (ref 0–150)
TSH REFLEX: 1.01 UIU/ML (ref 0.27–4.2)
VLDLC SERPL CALC-MCNC: 9 MG/DL

## 2022-04-14 ENCOUNTER — OFFICE VISIT (OUTPATIENT)
Dept: INTERNAL MEDICINE CLINIC | Age: 87
End: 2022-04-14
Payer: COMMERCIAL

## 2022-04-14 VITALS
HEIGHT: 74 IN | BODY MASS INDEX: 20.02 KG/M2 | SYSTOLIC BLOOD PRESSURE: 130 MMHG | WEIGHT: 156 LBS | OXYGEN SATURATION: 99 % | HEART RATE: 81 BPM | DIASTOLIC BLOOD PRESSURE: 70 MMHG

## 2022-04-14 DIAGNOSIS — E78.2 MIXED HYPERLIPIDEMIA: ICD-10-CM

## 2022-04-14 DIAGNOSIS — G31.84 MCI (MILD COGNITIVE IMPAIRMENT) WITH MEMORY LOSS: ICD-10-CM

## 2022-04-14 DIAGNOSIS — G20 PARKINSON DISEASE (HCC): ICD-10-CM

## 2022-04-14 DIAGNOSIS — R73.02 IGT (IMPAIRED GLUCOSE TOLERANCE): Primary | ICD-10-CM

## 2022-04-14 DIAGNOSIS — I10 PRIMARY HYPERTENSION: ICD-10-CM

## 2022-04-14 LAB
CHP ED QC CHECK: NORMAL
GLUCOSE BLD-MCNC: 143 MG/DL
HBA1C MFR BLD: 6.2 %

## 2022-04-14 PROCEDURE — 82962 GLUCOSE BLOOD TEST: CPT | Performed by: INTERNAL MEDICINE

## 2022-04-14 PROCEDURE — 99214 OFFICE O/P EST MOD 30 MIN: CPT | Performed by: INTERNAL MEDICINE

## 2022-04-14 PROCEDURE — 83036 HEMOGLOBIN GLYCOSYLATED A1C: CPT | Performed by: INTERNAL MEDICINE

## 2022-04-14 ASSESSMENT — PATIENT HEALTH QUESTIONNAIRE - PHQ9
SUM OF ALL RESPONSES TO PHQ QUESTIONS 1-9: 0
SUM OF ALL RESPONSES TO PHQ9 QUESTIONS 1 & 2: 0
SUM OF ALL RESPONSES TO PHQ QUESTIONS 1-9: 0
2. FEELING DOWN, DEPRESSED OR HOPELESS: 0
1. LITTLE INTEREST OR PLEASURE IN DOING THINGS: 0

## 2022-04-14 NOTE — PATIENT INSTRUCTIONS
[facility] Locations - No appointment necessary. @ indicates the location is open Saturdays in addition to Monday through Friday. Call your preferred location for test preparation, business hours and other information you need. [employer] accepts [employer]. [facility]    @ [place] [facility] [address] [facility] [id number] [place] [address]. [place], [address]   Ph: [phone] [facility] [address]   Ph: [phone]  @ [facility] S[facility] [address] [facility]   Ph: [phone]    [facility] S[facility] [address]   Ph: [phone] @ [place] [facility] [address]  Ph: [phone] @ [facility] S[facility]. [address] [facility]. [place], [place] [address]   Ph: [phone]    [place]   @ [place] [facility] [address] [facility] [address] [place] [address]   Ph: [phone] [facility] [address] [facility], [address] [facility]  Ph: [phone] HOSPITAL:  [address] [facility]   Ph: [phone] [facility] Lab Svcs. [address] [place], [place] [address]   Ph: [phone]        Write schedule down every day.

## 2022-04-14 NOTE — PROGRESS NOTES
Patient: Long Pena is a 82 y.o. male who presents today with the following Chief Complaint(s):    Chief Complaint   Patient presents with    Diabetes    Hypertension         HPIoff balance, urinating a lot, forgetting. 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 Got gu"[employer], [place]" working. Wife had stroke few years ago. Has to be with her to. He feels ok. [employer]. Current Outpatient Medications   Medication Sig Dispense Refill    donepezil (ARICEPT) 10 MG tablet Take 10 mg by mouth nightly      clotrimazole-betamethasone (LOTRISONE) 1-0.05 % cream Apply topically 2 times daily. 15 g 0    sildenafil (VIAGRA) 100 MG tablet Take 1 tablet by mouth as needed for Erectile Dysfunction 6 tablet 5    therapeutic multivitamin-minerals (THERAGRAN-M) tablet Take 1 tablet by mouth daily.  aspirin 81 MG EC tablet Take 81 mg by mouth daily.  Cholecalciferol (VITAMIN D) 1000 UNIT CAPS Take  by mouth daily.  atorvastatin (LIPITOR) 40 MG tablet Take 1 tablet by mouth daily for cholesterol. 90 tablet 0    lisinopril (PRINIVIL;ZESTRIL) 10 MG tablet Take 1 tablet by mouth daily for blood pressure and heart. 90 tablet 0    metoprolol succinate (TOPROL XL) 25 MG extended release tablet Take 1 tablet by mouth daily for blood pressure and heart. 90 tablet 0    tamsulosin (FLOMAX) 0.4 MG capsule Take 1 capsule by mouth daily 90 capsule 0     No current facility-administered medications for this visit. Patient's past medical history, surgical history, family history, medications,and allergies  were all reviewed and updated as appropriate today. Review of Systems      Physical Exam    Vitals:    04/14/22 0839   BP: 130/70   Pulse: 81   SpO2: 99%       Assessment:  Encounter Diagnosis   Name Primary?  IGT (impaired glucose tolerance) Yes       Plan:  1.  IGT (impaired glucose tolerance)  ***  - POCT Glucose  - POCT glycosylated hemoglobin (Hb A1C)

## 2022-05-02 ASSESSMENT — ENCOUNTER SYMPTOMS
EYES NEGATIVE: 1
GASTROINTESTINAL NEGATIVE: 1
RESPIRATORY NEGATIVE: 1

## 2022-05-02 NOTE — PROGRESS NOTES
Patient: Ml Hassan is a 82 y.o. male who presents today with the following Chief Complaint(s):    Chief Complaint   Patient presents with    Diabetes    Hypertension         HPIHe is here for a check up and f/u on HTN, HLD, CAD with past h/o CABG, and angioplasty. Accompanied by his son. He is taking medication as prescribed, stays active with walking and diet is ok prepared by family. Progressive motor challenges with memory and cognitive changes lead to neurology consultation. Started on sinemet but stopped it because of side effect of lethargy. He continues on Aricept. 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 Has 1 son, 2 daughters who provide support system. His wife had a stroke a few years ago. He assists in her care. Current Outpatient Medications   Medication Sig Dispense Refill    donepezil (ARICEPT) 10 MG tablet Take 10 mg by mouth nightly      clotrimazole-betamethasone (LOTRISONE) 1-0.05 % cream Apply topically 2 times daily. 15 g 0    sildenafil (VIAGRA) 100 MG tablet Take 1 tablet by mouth as needed for Erectile Dysfunction 6 tablet 5    therapeutic multivitamin-minerals (THERAGRAN-M) tablet Take 1 tablet by mouth daily.  aspirin 81 MG EC tablet Take 81 mg by mouth daily.  Cholecalciferol (VITAMIN D) 1000 UNIT CAPS Take  by mouth daily.  atorvastatin (LIPITOR) 40 MG tablet Take 1 tablet by mouth daily for cholesterol. 90 tablet 0    lisinopril (PRINIVIL;ZESTRIL) 10 MG tablet Take 1 tablet by mouth daily for blood pressure and heart. 90 tablet 0    metoprolol succinate (TOPROL XL) 25 MG extended release tablet Take 1 tablet by mouth daily for blood pressure and heart. 90 tablet 0    tamsulosin (FLOMAX) 0.4 MG capsule Take 1 capsule by mouth daily 90 capsule 0     No current facility-administered medications for this visit.        Patient's past medical history, surgical history, family history, medications,and allergies  were all reviewed and updated as appropriate today. Review of Systems   Constitutional: Negative. HENT: Negative. Eyes: Negative. Respiratory: Negative. Cardiovascular:        Hypertension, treated with B blker, and ACEi. CAD, see HPI. Denies CP or SOB. Gastrointestinal: Negative. Endocrine:        Hyperlipidemia, treated  with statin. . IGT, diet controlled. A1c 6.2. Genitourinary: Negative. Musculoskeletal: Negative. Skin: Negative. Neurological:        Parkinson's disease. See HPI. Psychiatric/Behavioral:        Memory and cognitive concerns. See HPI. Physical Exam  Constitutional:       General: He is not in acute distress. Appearance: He is well-developed. HENT:      Head: Normocephalic and atraumatic. Right Ear: External ear normal.      Left Ear: External ear normal.      Nose: Nose normal.   Eyes:      General: No scleral icterus. Conjunctiva/sclera: Conjunctivae normal.      Pupils: Pupils are equal, round, and reactive to light. Neck:      Thyroid: No thyromegaly. Cardiovascular:      Rate and Rhythm: Normal rate and regular rhythm. Heart sounds: Normal heart sounds. Pulmonary:      Effort: Pulmonary effort is normal.      Breath sounds: Normal breath sounds. Abdominal:      General: Bowel sounds are normal.      Palpations: Abdomen is soft. There is no mass. Musculoskeletal:      Cervical back: Normal range of motion and neck supple. Lymphadenopathy:      Cervical: No cervical adenopathy. Skin:     General: Skin is warm and dry. Neurological:      Mental Status: He is alert and oriented to person, place, and time. Deep Tendon Reflexes: Reflexes are normal and symmetric. Psychiatric:         Behavior: Behavior normal.         Thought Content: Thought content normal.         Judgment: Judgment normal.         Vitals:    04/14/22 0839   BP: 130/70   Pulse: 81   SpO2: 99%       Assessment:   Diagnosis Orders   1. Primary hypertension  Continue ACEi and ARB. DASH and low sodium diet discussed. COMPREHENSIVE METABOLIC PANEL    CBC WITH AUTO DIFFERENTIAL    TSH with Reflex    URINALYSIS   2. Parkinson's disease ([address]  Sinemet side effect. Neurology f/u. Medication adjustment. 3. MCI (mild cognitive impairment) with memory loss  Dementia. Aricept started. Planning and organization stressed. Organized social structure discussed. 4. IGT (impaired glucose tolerance)  POCT Glucose    POCT glycosylated hemoglobin (Hb A1C)  Diet discussed. 5. Mixed hyperlipidemia  Lipid Panel  Heart healthy diet and statin compliance discussed. Plan:  See plans above.

## 2022-07-11 NOTE — PATIENT INSTRUCTIONS
Consider Home Health Aide  Continue current medications. [facility] Laboratory Locations - No appointment necessary. @ indicates the location is open Saturdays in addition to Monday through Friday. Call your preferred location for test preparation, business hours and other information you need. [employer] accepts [employer]. [facility]    @ [place] [facility] Svcs. 3 [facility] [address] [place] [address]. Angelina [address]   Ph: [phone] [facility] Lab Svcs. [address]   Ph: [phone]  @ [facility] Lab Svcs. [address] [facility]   Ph: [phone]    [facility] Lab Svcs. [address] [facility] [address]   Ph: [phone] @ [place] [facility] Svcs. [address]  Ph: [phone] @ [facility] Lab Svcs. [address] [facility] [address]   Ph: [phone]     [facility] Svcs. [place] [address]  Ph: [phone]    [place]   @ [place] [facility] Svcs. [address] [place], [place] [address]   Ph: [phone] [facility] Med. Office Bldg. [address] [facility], [address] [facility]  Ph: [address] [place] [address]   [facility]:  [address] Lab Svcs[address] [facility]   Ph: [phone] [facility]. Lab Svcs.   [address] [facility], [facility]   Ph: [phone]

## 2022-07-12 ENCOUNTER — OFFICE VISIT (OUTPATIENT)
Dept: INTERNAL MEDICINE CLINIC | Age: 87
End: 2022-07-12
Payer: COMMERCIAL

## 2022-07-12 VITALS
WEIGHT: 150.6 LBS | HEART RATE: 78 BPM | DIASTOLIC BLOOD PRESSURE: 82 MMHG | OXYGEN SATURATION: 95 % | BODY MASS INDEX: 19.34 KG/M2 | SYSTOLIC BLOOD PRESSURE: 146 MMHG

## 2022-07-12 DIAGNOSIS — R31.29 OTHER MICROSCOPIC HEMATURIA: ICD-10-CM

## 2022-07-12 DIAGNOSIS — R35.0 FREQUENT URINATION: Primary | ICD-10-CM

## 2022-07-12 DIAGNOSIS — I10 ESSENTIAL HYPERTENSION: ICD-10-CM

## 2022-07-12 DIAGNOSIS — N40.1 BENIGN PROSTATIC HYPERPLASIA WITH URINARY FREQUENCY: ICD-10-CM

## 2022-07-12 DIAGNOSIS — R73.02 IGT (IMPAIRED GLUCOSE TOLERANCE): ICD-10-CM

## 2022-07-12 DIAGNOSIS — R35.0 BENIGN PROSTATIC HYPERPLASIA WITH URINARY FREQUENCY: ICD-10-CM

## 2022-07-12 LAB
BILIRUBIN, POC: NEGATIVE
BLOOD URINE, POC: NORMAL
CHP ED QC CHECK: NORMAL
CLARITY, POC: CLEAR
COLOR, POC: YELLOW
GLUCOSE BLD-MCNC: 90 MG/DL
GLUCOSE URINE, POC: NEGATIVE
HBA1C MFR BLD: 5.8 %
KETONES, POC: NEGATIVE
LEUKOCYTE EST, POC: NEGATIVE
NITRITE, POC: NEGATIVE
PH, POC: 5.5
PROTEIN, POC: 30
SPECIFIC GRAVITY, POC: 1.02
UROBILINOGEN, POC: 0.2

## 2022-07-12 PROCEDURE — 82962 GLUCOSE BLOOD TEST: CPT | Performed by: NURSE PRACTITIONER

## 2022-07-12 PROCEDURE — 99214 OFFICE O/P EST MOD 30 MIN: CPT | Performed by: NURSE PRACTITIONER

## 2022-07-12 PROCEDURE — 81002 URINALYSIS NONAUTO W/O SCOPE: CPT | Performed by: NURSE PRACTITIONER

## 2022-07-12 PROCEDURE — 1123F ACP DISCUSS/DSCN MKR DOCD: CPT | Performed by: NURSE PRACTITIONER

## 2022-07-12 PROCEDURE — 83036 HEMOGLOBIN GLYCOSYLATED A1C: CPT | Performed by: NURSE PRACTITIONER

## 2022-07-12 RX ORDER — TAMSULOSIN HYDROCHLORIDE 0.4 MG/1
0.4 CAPSULE ORAL DAILY
Qty: 90 CAPSULE | Refills: 0 | Status: SHIPPED | OUTPATIENT
Start: 2022-07-12 | End: 2022-10-11

## 2022-07-12 RX ORDER — LISINOPRIL 10 MG/1
10 TABLET ORAL DAILY
Qty: 90 TABLET | Refills: 0 | Status: SHIPPED | OUTPATIENT
Start: 2022-07-12 | End: 2022-09-21

## 2022-07-12 ASSESSMENT — ENCOUNTER SYMPTOMS
GASTROINTESTINAL NEGATIVE: 1
RESPIRATORY NEGATIVE: 1

## 2022-07-12 NOTE — PROGRESS NOTES
Patient: Johanna Bonilla is a 82 y.o. male who presents today with the following Chief Complaint(s):  Chief Complaint   Patient presents with    Urinary Frequency       HPI  Presents to the office c/o urinary frequency, urgency, memory loss, and balance issues. These are ongoing concerns. Is here alone. Unsure of what medications he is taking. Says son puts them in the pill box but he forgets to take them. Is being seen by neurology but unsure if he is taking donepezil.  - A1c is 5.8        Current Outpatient Medications   Medication Sig Dispense Refill    tamsulosin (FLOMAX) 0.4 MG capsule Take 1 capsule by mouth daily 90 capsule 0    lisinopril (PRINIVIL;ZESTRIL) 10 MG tablet Take 1 tablet by mouth daily for blood pressure and heart. 90 tablet 0    donepezil (ARICEPT) 10 MG tablet Take 10 mg by mouth nightly      sildenafil (VIAGRA) 100 MG tablet Take 1 tablet by mouth as needed for Erectile Dysfunction 6 tablet 5    therapeutic multivitamin-minerals (THERAGRAN-M) tablet Take 1 tablet by mouth daily.  aspirin 81 MG EC tablet Take 81 mg by mouth daily.  atorvastatin (LIPITOR) 40 MG tablet Take 1 tablet by mouth daily for cholesterol. 90 tablet 0    metoprolol succinate (TOPROL XL) 25 MG extended release tablet Take 1 tablet by mouth daily for blood pressure and heart. 90 tablet 0    clotrimazole-betamethasone (LOTRISONE) 1-0.05 % cream Apply topically 2 times daily. (Patient not taking: Reported on 7/12/2022) 15 g 0    Cholecalciferol (VITAMIN D) 1000 UNIT CAPS Take  by mouth daily. (Patient not taking: Reported on 7/12/2022)       No current facility-administered medications for this visit. Patient's past medical history, surgical history, family history, medications,  and allergies  were all reviewed and updated as appropriate today.     Patient Active Problem List   Diagnosis    Hypertension    Hyperlipidemia    CAD (coronary artery disease)    BPH (benign prostatic hyperplasia)    Elevated PSA    Prostate cancer (HCC)    Primary osteoarthritis of left knee    Contusion of left knee    Tremor of right hand    Balance disorder    Impaired memory     Past Medical History:   Diagnosis Date    CAD (coronary artery disease)     Chest pain     Dyslipidemia     Fatigue     Hard of hearing     Hyperthyroidism     SOB (shortness of breath)     Vitamin D deficiency       Past Surgical History:   Procedure Laterality Date    CARDIAC SURGERY         History reviewed. No pertinent family history. No Known Allergies      Review of Systems   Constitutional: Negative. HENT: Negative. Respiratory: Negative. Cardiovascular: Negative. Gastrointestinal: Negative. Genitourinary: Positive for frequency and urgency. Musculoskeletal: Positive for gait problem. Unsteady, wide, shuffles   Skin: Negative. Neurological: Positive for dizziness. Psychiatric/Behavioral: Positive for confusion. Vitals:    07/12/22 0919 07/12/22 0925   BP: (!) 150/80 (!) 146/82   Site: Left Upper Arm    Position: Sitting    Cuff Size: Small Adult    Pulse: 78    SpO2: 95%    Weight: 150 lb 9.6 oz (68.3 kg)      Physical Exam  Constitutional:       General: He is not in acute distress. Appearance: Normal appearance. He is normal weight. He is not ill-appearing, toxic-appearing or diaphoretic. HENT:      Head: Normocephalic. Cardiovascular:      Rate and Rhythm: Normal rate and regular rhythm. Pulses: Normal pulses. Heart sounds: Normal heart sounds. No murmur heard. No friction rub. No gallop. Pulmonary:      Effort: Pulmonary effort is normal. No respiratory distress. Breath sounds: Normal breath sounds. No stridor. No wheezing, rhonchi or rales. Abdominal:      General: Bowel sounds are normal. There is no distension. Palpations: Abdomen is soft. There is no mass. Tenderness: There is no abdominal tenderness. There is no guarding. Hernia: No hernia is present. Musculoskeletal:         General: Normal range of motion. Skin:     General: Skin is warm and dry. Neurological:      Mental Status: He is alert and oriented to person, place, and time. Gait: Gait abnormal.   Psychiatric:         Mood and Affect: Mood normal.         Speech: Speech normal.         Behavior: Behavior normal.         Thought Content: Thought content normal.         Cognition and Memory: Memory is impaired. Assessment/Plan:  1. Frequent urination  - POCT Urinalysis no Micro  Component 7/12/22 0955   Color, UA yellow    Clarity, UA clear    Glucose, UA POC negative    Bilirubin, UA negative    Ketones, UA negative    Spec Grav, UA 1.025    Blood, UA POC trace-intact    pH, UA 5.5    Protein, UA POC 30    Urobilinogen, UA 0.2    Leukocytes, UA negative    Nitrite, UA negative      2. IGT (impaired glucose tolerance)  - POCT glycosylated hemoglobin (Hb A1C)  - POCT Glucose    3. Benign prostatic hyperplasia with urinary frequency  - tamsulosin (FLOMAX) 0.4 MG capsule; Take 1 capsule by mouth daily  Dispense: 90 capsule; Refill: 0    4. Essential hypertension  - lisinopril (PRINIVIL;ZESTRIL) 10 MG tablet; Take 1 tablet by mouth daily for blood pressure and heart. Dispense: 90 tablet; Refill: 0    5. Other microscopic hematuria  - Follow up for repeat UA    Bring medication to next visit. Return in about 1 month (around 8/12/2022), or if symptoms worsen or fail to improve.

## 2022-09-01 ENCOUNTER — OFFICE VISIT (OUTPATIENT)
Dept: INTERNAL MEDICINE CLINIC | Age: 87
End: 2022-09-01
Payer: COMMERCIAL

## 2022-09-01 VITALS
DIASTOLIC BLOOD PRESSURE: 82 MMHG | SYSTOLIC BLOOD PRESSURE: 140 MMHG | TEMPERATURE: 97.1 F | BODY MASS INDEX: 21.36 KG/M2 | HEIGHT: 71 IN | HEART RATE: 85 BPM | OXYGEN SATURATION: 97 % | WEIGHT: 152.6 LBS

## 2022-09-01 DIAGNOSIS — I10 PRIMARY HYPERTENSION: ICD-10-CM

## 2022-09-01 DIAGNOSIS — R60.0 BILATERAL LEG EDEMA: ICD-10-CM

## 2022-09-01 DIAGNOSIS — H91.93 HEARING LOSS ASSOCIATED WITH SYNDROME OF BOTH EARS: ICD-10-CM

## 2022-09-01 DIAGNOSIS — F02.A0 MILD LATE ONSET ALZHEIMER'S DEMENTIA WITHOUT BEHAVIORAL DISTURBANCE, PSYCHOTIC DISTURBANCE, MOOD DISTURBANCE, OR ANXIETY (HCC): ICD-10-CM

## 2022-09-01 DIAGNOSIS — G30.1 MILD LATE ONSET ALZHEIMER'S DEMENTIA WITHOUT BEHAVIORAL DISTURBANCE, PSYCHOTIC DISTURBANCE, MOOD DISTURBANCE, OR ANXIETY (HCC): ICD-10-CM

## 2022-09-01 DIAGNOSIS — R73.02 IGT (IMPAIRED GLUCOSE TOLERANCE): ICD-10-CM

## 2022-09-01 DIAGNOSIS — N32.81 OAB (OVERACTIVE BLADDER): Primary | ICD-10-CM

## 2022-09-01 DIAGNOSIS — N32.81 OAB (OVERACTIVE BLADDER): ICD-10-CM

## 2022-09-01 PROCEDURE — 99214 OFFICE O/P EST MOD 30 MIN: CPT | Performed by: INTERNAL MEDICINE

## 2022-09-01 PROCEDURE — 1123F ACP DISCUSS/DSCN MKR DOCD: CPT | Performed by: INTERNAL MEDICINE

## 2022-09-01 RX ORDER — OXYBUTYNIN CHLORIDE 5 MG/1
5 TABLET, EXTENDED RELEASE ORAL DAILY
Qty: 30 TABLET | Refills: 3 | Status: SHIPPED | OUTPATIENT
Start: 2022-09-01

## 2022-09-01 RX ORDER — FUROSEMIDE 20 MG/1
20 TABLET ORAL DAILY PRN
Qty: 30 TABLET | Refills: 1 | Status: SHIPPED | OUTPATIENT
Start: 2022-09-01

## 2022-09-01 RX ORDER — OXYBUTYNIN CHLORIDE 5 MG/1
5 TABLET, EXTENDED RELEASE ORAL DAILY
Qty: 90 TABLET | OUTPATIENT
Start: 2022-09-01

## 2022-09-01 RX ORDER — FUROSEMIDE 20 MG/1
TABLET ORAL
Qty: 90 TABLET | OUTPATIENT
Start: 2022-09-01

## 2022-09-01 SDOH — ECONOMIC STABILITY: FOOD INSECURITY: WITHIN THE PAST 12 MONTHS, THE FOOD YOU BOUGHT JUST DIDN'T LAST AND YOU DIDN'T HAVE MONEY TO GET MORE.: NEVER TRUE

## 2022-09-01 SDOH — ECONOMIC STABILITY: FOOD INSECURITY: WITHIN THE PAST 12 MONTHS, YOU WORRIED THAT YOUR FOOD WOULD RUN OUT BEFORE YOU GOT MONEY TO BUY MORE.: NEVER TRUE

## 2022-09-01 ASSESSMENT — SOCIAL DETERMINANTS OF HEALTH (SDOH): HOW HARD IS IT FOR YOU TO PAY FOR THE VERY BASICS LIKE FOOD, HOUSING, MEDICAL CARE, AND HEATING?: NOT HARD AT ALL

## 2022-09-01 NOTE — PROGRESS NOTES
Nt  Patient: Bart Whitmore is a 82 y.o. male who presents today with the following Chief Complaint(s):    Chief Complaint   Patient presents with    Follow-up         HPIpiss a lot, forget a lot, takes flomax, helps some. Off balance. [employer]. 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 Workers a problem. In good shape financially retired from city. Dont have to work. Turning everything over to son and daughters name. Leg edema. Breathing ok. Current Outpatient Medications   Medication Sig Dispense Refill    tamsulosin (FLOMAX) 0.4 MG capsule Take 1 capsule by mouth daily 90 capsule 0    lisinopril (PRINIVIL;ZESTRIL) 10 MG tablet Take 1 tablet by mouth daily for blood pressure and heart. 90 tablet 0    donepezil (ARICEPT) 10 MG tablet Take 10 mg by mouth nightly      clotrimazole-betamethasone (LOTRISONE) 1-0.05 % cream Apply topically 2 times daily. 15 g 0    sildenafil (VIAGRA) 100 MG tablet Take 1 tablet by mouth as needed for Erectile Dysfunction 6 tablet 5    therapeutic multivitamin-minerals (THERAGRAN-M) tablet Take 1 tablet by mouth daily. aspirin 81 MG EC tablet Take 81 mg by mouth daily. Cholecalciferol (VITAMIN D) 1000 UNIT CAPS Take by mouth daily      atorvastatin (LIPITOR) 40 MG tablet Take 1 tablet by mouth daily for cholesterol. 90 tablet 0    metoprolol succinate (TOPROL XL) 25 MG extended release tablet Take 1 tablet by mouth daily for blood pressure and heart. 90 tablet 0     No current facility-administered medications for this visit. Patient's past medical history, surgical history, family history, medications,and allergies  were all reviewed and updated as appropriate today. Review of Systems      Physical Exam    Vitals:    09/01/22 0859   BP: (!) 140/82   Pulse:    Temp:    SpO2:        Assessment:  No diagnosis found. Plan:  There are no diagnoses linked to this encounter.

## 2022-09-01 NOTE — PATIENT INSTRUCTIONS
[facility] Laboratory Locations - No appointment necessary. @ indicates the location is open Saturdays in addition to Monday through Friday. Call your preferred location for test preparation, business hours and other information you need. [employer] accepts [employer]. [facility]    @ [place] Lab Svcs. 3 [facility] [id number] [place] [address]. [address]   Ph: [phone] [facility] Sv[facility]. [address]   Ph: [phone]  @ [facility] Lab Svcs. [address] [facility]   Ph: [phone]    [facility] Lab Svcs. [address]   Ph: [phone] @ [place] [facility] Svcs. [address]  Ph: [phone] @ [facility] Lab Svcs. [address] [facility]. [address]   Ph: [phone]     [facility] Svcs. [place] [address]  Ph: [phone]    [place]   @ [place] [facility] Svcs. [address] [facility] [place], [place] [address]   Ph: [phone] [facility] Med. Office Bldg. [address] [facility], [address] [place] [address]  Ph: [address] [place] [address]   [address] Lab Svcs[address] [facility]   Ph: [phone] [facility]. Lab Svcs. [address] [place], [place] [address]   Ph: [phone]      Incontinence: related to prostate enlargement and over active bladder. Will add ditropan to treatment which is for over active bladder. He is already on flomax for prostate enlargement. Memory challenges: related to mild dementia. Proper rest, healthy eating with lots of fruits and vegetables, and regular exercise is important. Planning and organizing his day is important. Balance issues: related to Parkinson's disease. Has to take his time with movement being careful to watch his step. May benefit from PT. Leg swelling. Lasix, as needed.

## 2022-09-08 DIAGNOSIS — E78.00 PURE HYPERCHOLESTEROLEMIA: ICD-10-CM

## 2022-09-08 RX ORDER — ATORVASTATIN CALCIUM 40 MG/1
40 TABLET, FILM COATED ORAL DAILY
Qty: 90 TABLET | Refills: 0 | OUTPATIENT
Start: 2022-09-08 | End: 2022-12-07

## 2022-09-19 DIAGNOSIS — I10 ESSENTIAL HYPERTENSION: ICD-10-CM

## 2022-09-19 NOTE — TELEPHONE ENCOUNTER
Medication:   Requested Prescriptions     Pending Prescriptions Disp Refills    lisinopril (PRINIVIL;ZESTRIL) 10 MG tablet [Pharmacy Med Name: LISINOPRIL 10MG TABLETS] 90 tablet 0     Sig: TAKE 1 TABLET BY MOUTH DAILY FOR BLOOD PRESSURE AND HEART        Last Filled: 07/12/22    Patient Phone Number: [phone] (home)     Last appt: 9/1/2022   Next appt: 12/2/2022

## 2022-09-20 DIAGNOSIS — I10 ESSENTIAL HYPERTENSION: ICD-10-CM

## 2022-09-21 RX ORDER — METOPROLOL SUCCINATE 25 MG/1
25 TABLET, EXTENDED RELEASE ORAL DAILY
Qty: 90 TABLET | Refills: 3 | Status: SHIPPED | OUTPATIENT
Start: 2022-09-21 | End: 2022-12-20

## 2022-09-21 RX ORDER — LISINOPRIL 10 MG/1
10 TABLET ORAL DAILY
Qty: 90 TABLET | Refills: 0 | Status: SHIPPED | OUTPATIENT
Start: 2022-09-21 | End: 2022-12-20

## 2022-09-23 ENCOUNTER — TELEPHONE (OUTPATIENT)
Dept: INTERNAL MEDICINE CLINIC | Age: 87
End: 2022-09-23

## 2022-09-23 DIAGNOSIS — E78.00 PURE HYPERCHOLESTEROLEMIA: ICD-10-CM

## 2022-09-23 RX ORDER — ATORVASTATIN CALCIUM 40 MG/1
40 TABLET, FILM COATED ORAL DAILY
Qty: 90 TABLET | Refills: 3 | Status: SHIPPED | OUTPATIENT
Start: 2022-09-23 | End: 2022-12-22

## 2022-09-24 DIAGNOSIS — E78.00 PURE HYPERCHOLESTEROLEMIA: ICD-10-CM

## 2022-09-26 RX ORDER — ATORVASTATIN CALCIUM 40 MG/1
40 TABLET, FILM COATED ORAL DAILY
Qty: 90 TABLET | Refills: 3 | OUTPATIENT
Start: 2022-09-26 | End: 2022-12-25

## 2022-09-26 NOTE — TELEPHONE ENCOUNTER
Medication:   Requested Prescriptions     Pending Prescriptions Disp Refills    atorvastatin (LIPITOR) 40 MG tablet [Pharmacy Med Name: ATORVASTATIN 40MG TABLETS] 90 tablet 3     Sig: TAKE 1 TABLET BY MOUTH DAILY FOR CHOLESTEROL        Last Filled:      Patient Phone Number: [phone] (home)     Last appt: 9/1/2022   Next appt: 12/2/2022

## 2022-10-01 ASSESSMENT — ENCOUNTER SYMPTOMS
RESPIRATORY NEGATIVE: 1
EYES NEGATIVE: 1
GASTROINTESTINAL NEGATIVE: 1

## 2022-10-02 NOTE — PROGRESS NOTES
Patient: Carla Goodwin is a 82 y.o. male who presents today with the following Chief Complaint(s):    Chief Complaint   Patient presents with    Follow-up         Diabetes    Hypertension  He is here for a check up and f/u on HTN, HLD, CAD with past h/o CABG, and angioplasty. Accompanied by his son. He is taking medication as prescribed, stays active with walking and diet is ok prepared by family. Progressive motor challenges with memory and cognitive changes lead to neurology consultation. Started on sinemet but stopped it because of side effect of lethargy. He continues on Aricept. He is still driving short distances. Still has [employer] business. Has 1 son, 2 daughters who provide support system. His wife had a stroke a few years ago. He assists in her care. Current Outpatient Medications   Medication Sig Dispense Refill    oxybutynin (DITROPAN XL) 5 MG extended release tablet Take 1 tablet by mouth daily 30 tablet 3    furosemide (LASIX) 20 MG tablet Take 1 tablet by mouth daily as needed (leg swelling.) 30 tablet 1    tamsulosin (FLOMAX) 0.4 MG capsule Take 1 capsule by mouth daily 90 capsule 0    donepezil (ARICEPT) 10 MG tablet Take 10 mg by mouth nightly      clotrimazole-betamethasone (LOTRISONE) 1-0.05 % cream Apply topically 2 times daily. 15 g 0    sildenafil (VIAGRA) 100 MG tablet Take 1 tablet by mouth as needed for Erectile Dysfunction 6 tablet 5    therapeutic multivitamin-minerals (THERAGRAN-M) tablet Take 1 tablet by mouth daily. aspirin 81 MG EC tablet Take 81 mg by mouth daily. Cholecalciferol (VITAMIN D) 1000 UNIT CAPS Take by mouth daily      atorvastatin (LIPITOR) 40 MG tablet Take 1 tablet by mouth daily for cholesterol.  90 tablet 3    lisinopril (PRINIVIL;ZESTRIL) 10 MG tablet TAKE 1 TABLET BY MOUTH DAILY FOR BLOOD PRESSURE AND HEART 90 tablet 0    metoprolol succinate (TOPROL XL) 25 MG extended release tablet TAKE 1 TABLET BY MOUTH DAILY FOR BLOOD PRESSURE AND HEART 90 tablet 3     No current facility-administered medications for this visit. Patient's past medical history, surgical history, family history, medications,and allergies  were all reviewed and updated as appropriate today. Review of Systems   Constitutional: Negative. HENT: Negative. Eyes: Negative. Respiratory: Negative. Cardiovascular:         Hypertension, treated with B blker, and ACEi. CAD, see HPI. Denies CP or SOB. Gastrointestinal: Negative. Endocrine:        Hyperlipidemia, treated  with statin. . IGT, diet controlled. A1c 5.8. Genitourinary: Negative. Musculoskeletal: Negative. Skin: Negative. Neurological:         Parkinson's disease. See HPI. Psychiatric/Behavioral:          Memory and cognitive concerns. See HPI. Physical Exam  Constitutional:       General: He is not in acute distress. Appearance: He is well-developed. HENT:      Head: Normocephalic and atraumatic. Right Ear: External ear normal.      Left Ear: External ear normal.      Nose: Nose normal.   Eyes:      General: No scleral icterus. Conjunctiva/sclera: Conjunctivae normal.      Pupils: Pupils are equal, round, and reactive to light. Neck:      Thyroid: No thyromegaly. Cardiovascular:      Rate and Rhythm: Normal rate and regular rhythm. Heart sounds: Normal heart sounds. Pulmonary:      Effort: Pulmonary effort is normal.      Breath sounds: Normal breath sounds. Abdominal:      General: Bowel sounds are normal.      Palpations: Abdomen is soft. There is no mass. Musculoskeletal:      Cervical back: Normal range of motion and neck supple. Lymphadenopathy:      Cervical: No cervical adenopathy. Skin:     General: Skin is warm and dry. Neurological:      Mental Status: He is alert and oriented to person, place, and time. Deep Tendon Reflexes: Reflexes are normal and symmetric.    Psychiatric:         Behavior: Behavior normal. Thought Content: Thought content normal.         Judgment: Judgment normal.       Vitals:    09/01/22 0859   BP: (!) 140/82   Pulse:    Temp:    SpO2:        Assessment:   Diagnosis Orders   1. Primary hypertension  Continue ACEi and ARB. DASH and low sodium diet discussed. COMPREHENSIVE METABOLIC PANEL    CBC WITH AUTO DIFFERENTIAL    TSH with Reflex    URINALYSIS   2. Parkinson's disease ([address]  Sinemet side effect. Neurology f/u. Medication adjustment. 3. MCI (mild cognitive impairment) with memory loss  Dementia. Aricept started. Planning and organization stressed. Organized social structure discussed. 4. IGT (impaired glucose tolerance)  POCT Glucose    POCT glycosylated hemoglobin (Hb A1C)  Diet discussed. 5. Mixed hyperlipidemia  Lipid Panel  Heart healthy diet and statin compliance discussed. 6.    Leg edema: lower sodium, compression hose, diuretic. Clinically no s/o HF. Will monitor. Check ECHO. Plan:  See plans above.

## 2022-10-04 ENCOUNTER — NURSE TRIAGE (OUTPATIENT)
Dept: OTHER | Facility: CLINIC | Age: 87
End: 2022-10-04

## 2022-10-04 NOTE — TELEPHONE ENCOUNTER
Received call from Nolvia Krishnan at [facility] with Red Flag Complaint. Limited triage due to caller not with pt    Subjective: Caller states \"has been fatigued and confused. Has been saying he's not feeling well. Has been a little more off balance\"     Current Symptoms:     Onset: 1 week ago; intermittent    Associated Symptoms: NA    Pain Severity: denies pain    Temperature:  unsure    What has been tried:     LMP: NA Pregnant: NA    Recommended disposition: Go to [facility]    Care advice provided, patient verbalizes understanding; denies any other questions or concerns; instructed to call back for any new or worsening symptoms. Patient/Caller agrees with recommended disposition; writer provided warm transfer to [place] at [facility] for appointment scheduling    Attention Provider: Thank you for allowing me to participate in the care of your patient. The patient was connected to triage in response to information provided to the ECC/PSC. Please do not respond through this encounter as the response is not directed to a shared pool.         Reason for Disposition   MODERATE weakness (i.e., interferes with work, school, normal activities) and cause unknown  (Exceptions: Weakness with acute minor illness, or weakness from poor fluid intake.)   Patient wants to be seen (or caregiver requests)    Protocols used: Weakness (Generalized) and Fatigue-ADULT-OH, Confusion - Delirium-ADULT-OH

## 2022-10-05 ENCOUNTER — OFFICE VISIT (OUTPATIENT)
Dept: INTERNAL MEDICINE CLINIC | Age: 87
End: 2022-10-05
Payer: COMMERCIAL

## 2022-10-05 VITALS
SYSTOLIC BLOOD PRESSURE: 130 MMHG | OXYGEN SATURATION: 98 % | HEART RATE: 86 BPM | DIASTOLIC BLOOD PRESSURE: 70 MMHG | HEIGHT: 71 IN | BODY MASS INDEX: 21.95 KG/M2 | WEIGHT: 156.8 LBS

## 2022-10-05 DIAGNOSIS — R10.84 GENERALIZED ABDOMINAL PAIN: ICD-10-CM

## 2022-10-05 DIAGNOSIS — R26.89 BALANCE DISORDER: ICD-10-CM

## 2022-10-05 DIAGNOSIS — R07.9 CHEST PAIN AT REST: ICD-10-CM

## 2022-10-05 DIAGNOSIS — Z86.69: Primary | ICD-10-CM

## 2022-10-05 DIAGNOSIS — I10 PRIMARY HYPERTENSION: ICD-10-CM

## 2022-10-05 DIAGNOSIS — F01.50 VASCULAR DEMENTIA WITHOUT BEHAVIORAL DISTURBANCE, PSYCHOTIC DISTURBANCE, MOOD DISTURBANCE, OR ANXIETY, UNSPECIFIED DEMENTIA SEVERITY (HCC): ICD-10-CM

## 2022-10-05 DIAGNOSIS — N30.00 ACUTE CYSTITIS WITHOUT HEMATURIA: ICD-10-CM

## 2022-10-05 DIAGNOSIS — Z23 FLU VACCINE NEED: ICD-10-CM

## 2022-10-05 DIAGNOSIS — R06.02 SOB (SHORTNESS OF BREATH): ICD-10-CM

## 2022-10-05 PROCEDURE — 3078F DIAST BP <80 MM HG: CPT | Performed by: INTERNAL MEDICINE

## 2022-10-05 PROCEDURE — 90471 IMMUNIZATION ADMIN: CPT | Performed by: INTERNAL MEDICINE

## 2022-10-05 PROCEDURE — 90694 VACC AIIV4 NO PRSRV 0.5ML IM: CPT | Performed by: INTERNAL MEDICINE

## 2022-10-05 PROCEDURE — 3074F SYST BP LT 130 MM HG: CPT | Performed by: INTERNAL MEDICINE

## 2022-10-05 PROCEDURE — 1123F ACP DISCUSS/DSCN MKR DOCD: CPT | Performed by: INTERNAL MEDICINE

## 2022-10-05 PROCEDURE — 93000 ELECTROCARDIOGRAM COMPLETE: CPT | Performed by: INTERNAL MEDICINE

## 2022-10-05 PROCEDURE — 99214 OFFICE O/P EST MOD 30 MIN: CPT | Performed by: INTERNAL MEDICINE

## 2022-10-05 NOTE — PROGRESS NOTES
Patient: Suzan Khan is a 82 y.o. male who presents today with the following Chief Complaint(s):    Chief Complaint   Patient presents with    Fatigue     Feel off balanced when walking         HPIfeeling off balance, memory is getting worse, forgetting a lot. Still drives, but limited. Places look strange. Knees bother him when getting up. Stiff. Urine incontinence at times. Still cuts grass with riding mower. Doing mostly supervising. Neuropysch noted. Hearing evaluation. Referral back to neurology. PT for balance. Stopped aricept. Sedation. Current Outpatient Medications   Medication Sig Dispense Refill    atorvastatin (LIPITOR) 40 MG tablet Take 1 tablet by mouth daily for cholesterol. 90 tablet 3    lisinopril (PRINIVIL;ZESTRIL) 10 MG tablet TAKE 1 TABLET BY MOUTH DAILY FOR BLOOD PRESSURE AND HEART 90 tablet 0    metoprolol succinate (TOPROL XL) 25 MG extended release tablet TAKE 1 TABLET BY MOUTH DAILY FOR BLOOD PRESSURE AND HEART 90 tablet 3    oxybutynin (DITROPAN XL) 5 MG extended release tablet Take 1 tablet by mouth daily 30 tablet 3    furosemide (LASIX) 20 MG tablet Take 1 tablet by mouth daily as needed (leg swelling.) 30 tablet 1    tamsulosin (FLOMAX) 0.4 MG capsule Take 1 capsule by mouth daily 90 capsule 0    donepezil (ARICEPT) 10 MG tablet Take 10 mg by mouth nightly      clotrimazole-betamethasone (LOTRISONE) 1-0.05 % cream Apply topically 2 times daily. 15 g 0    sildenafil (VIAGRA) 100 MG tablet Take 1 tablet by mouth as needed for Erectile Dysfunction 6 tablet 5    therapeutic multivitamin-minerals (THERAGRAN-M) tablet Take 1 tablet by mouth daily. aspirin 81 MG EC tablet Take 81 mg by mouth daily. Cholecalciferol (VITAMIN D) 1000 UNIT CAPS Take by mouth daily       No current facility-administered medications for this visit.        Patient's past medical history, surgical history, family history, medications,and allergies  were all reviewed and updated as appropriate today.      Review of Systems   Constitutional: Negative. HENT: Negative. Eyes: Negative. Respiratory: Negative. Cardiovascular: Negative. Gastrointestinal: Negative. Genitourinary: Negative. Musculoskeletal: Negative. Skin: Negative. Neurological: Negative. Psychiatric/Behavioral: Negative. Physical Exam  Constitutional:       General: He is not in acute distress. Appearance: He is well-developed. HENT:      Head: Normocephalic and atraumatic. Right Ear: External ear normal.      Left Ear: External ear normal.      Nose: Nose normal.   Eyes:      General: No scleral icterus. Conjunctiva/sclera: Conjunctivae normal.      Pupils: Pupils are equal, round, and reactive to light. Neck:      Thyroid: No thyromegaly. Cardiovascular:      Rate and Rhythm: Normal rate and regular rhythm. Heart sounds: Normal heart sounds. Pulmonary:      Effort: Pulmonary effort is normal.      Breath sounds: Normal breath sounds. Abdominal:      General: Bowel sounds are normal.      Palpations: Abdomen is soft. There is no mass. Musculoskeletal:      Cervical back: Normal range of motion and neck supple. Lymphadenopathy:      Cervical: No cervical adenopathy. Skin:     General: Skin is warm and dry. Neurological:      Mental Status: He is alert and oriented to person, place, and time. Deep Tendon Reflexes: Reflexes are normal and symmetric. Psychiatric:         Behavior: Behavior normal.         Thought Content: Thought content normal.         Judgment: Judgment normal.       Vitals:    10/05/22 1303   BP: 130/70   Pulse: 86   SpO2: 98%       Assessment:   Diagnosis Orders   1. H/O hearing loss  Sarah Beth Goodson [place] Farrah LARA, Audiology, [place]      2. Balance disorder  [facility] Physical [facility]      3. Chest pain at rest  Troponin    EKG 12 lead      4. Generalized abdominal pain  Lipase      5.  Primary hypertension  Comprehensive Metabolic Panel    TSH with Reflex    Lipid Panel    CBC with Auto Differential      6. SOB (shortness of breath)  Brain Natriuretic Peptide      7. Vascular dementia without behavioral disturbance, psychotic disturbance, mood disturbance, or anxiety, unspecified dementia severity ([address]  MARYANN - Leda Nye MD, Neurology, [facility]      8. Acute cystitis without hematuria  Culture, Urine      9. Flu vaccine need  Influenza, FLUAD, (age 72 y+), IM, Preservative Free, 0.5 mL            Plan:    See plans above.

## 2022-10-05 NOTE — PATIENT INSTRUCTIONS
[facility] Locations - No appointment necessary. @ indicates the location is open Saturdays in addition to Monday through Friday. Call your preferred location for test preparation, business hours and other information you need. [employer] accepts [employer]. [facility]    @ [place] [facility] S[facility] [address] [facility] [address] [place] [address]. [place], [address]   Ph: [phone] [facility] Svcs. [address] [place] [address]   Ph: [phone]  @ [facility] Svcs. [address] [facility]   Ph: [phone]    [facility] Svcs. [address]   Ph: [phone] @ [place] [facility] Svcs[address]  Ph: [phone] @ [facility] Svcs. [address] [facility] [address]   Ph: [phone]     [facility] Svcs. [address]  Ph: [phone]    [place]   @ [place] [facility] Svcs[address] [address]   Ph: [phone] [facility] Office Bld[address] [facility], [address] [facility]  Ph: [address] [place] [address] [id number] [place] [address] [address] Lab Svcs[address] [facility]   Ph: [phone] [facility]. Lab Svcs.   [address] [facility], [address] [facility]   Ph: [phone]

## 2022-10-09 DIAGNOSIS — R35.0 BENIGN PROSTATIC HYPERPLASIA WITH URINARY FREQUENCY: ICD-10-CM

## 2022-10-09 DIAGNOSIS — N40.1 BENIGN PROSTATIC HYPERPLASIA WITH URINARY FREQUENCY: ICD-10-CM

## 2022-10-10 NOTE — TELEPHONE ENCOUNTER
Patients son was calling to check on med refill of Tamsulosin. He states that patient is all out of his medication.   Please advise

## 2022-10-10 NOTE — TELEPHONE ENCOUNTER
Medication:   Requested Prescriptions     Pending Prescriptions Disp Refills    tamsulosin (FLOMAX) 0.4 MG capsule [Pharmacy Med Name: TAMSULOSIN 0.4MG CAPSULES] 90 capsule 0     Sig: TAKE 1 CAPSULE BY MOUTH DAILY        Last Filled:  07/12/22    Patient Phone Number: [phone] (home)     Last appt: 10/5/2022   Next appt: 11/23/2022

## 2022-10-11 RX ORDER — TAMSULOSIN HYDROCHLORIDE 0.4 MG/1
0.4 CAPSULE ORAL DAILY
Qty: 90 CAPSULE | Refills: 3 | Status: SHIPPED | OUTPATIENT
Start: 2022-10-11 | End: 2022-10-26 | Stop reason: ALTCHOICE

## 2022-10-12 DIAGNOSIS — R07.9 CHEST PAIN AT REST: ICD-10-CM

## 2022-10-12 DIAGNOSIS — R06.02 SOB (SHORTNESS OF BREATH): ICD-10-CM

## 2022-10-12 DIAGNOSIS — I10 PRIMARY HYPERTENSION: ICD-10-CM

## 2022-10-12 DIAGNOSIS — N30.00 ACUTE CYSTITIS WITHOUT HEMATURIA: ICD-10-CM

## 2022-10-12 DIAGNOSIS — R10.84 GENERALIZED ABDOMINAL PAIN: ICD-10-CM

## 2022-10-13 LAB
A/G RATIO: 1.4 (ref 1.1–2.2)
ACANTHOCYTES: ABNORMAL
ALBUMIN SERPL-MCNC: 4 G/DL (ref 3.4–5)
ALP BLD-CCNC: 90 U/L (ref 40–129)
ALT SERPL-CCNC: 28 U/L (ref 10–40)
ANION GAP SERPL CALCULATED.3IONS-SCNC: 12 MMOL/L (ref 3–16)
AST SERPL-CCNC: 22 U/L (ref 15–37)
BASOPHILS ABSOLUTE: 0 K/UL (ref 0–0.2)
BASOPHILS RELATIVE PERCENT: 0.9 %
BILIRUB SERPL-MCNC: 0.4 MG/DL (ref 0–1)
BUN BLDV-MCNC: 16 MG/DL (ref 7–20)
BURR CELLS: ABNORMAL
CALCIUM SERPL-MCNC: 9.9 MG/DL (ref 8.3–10.6)
CHLORIDE BLD-SCNC: 105 MMOL/L (ref 99–110)
CHOLESTEROL, TOTAL: 195 MG/DL (ref 0–199)
CO2: 28 MMOL/L (ref 21–32)
CREAT SERPL-MCNC: 1.1 MG/DL (ref 0.8–1.3)
CRENATED RBC'S: ABNORMAL
EOSINOPHILS ABSOLUTE: 0 K/UL (ref 0–0.6)
EOSINOPHILS RELATIVE PERCENT: 0.6 %
GFR AFRICAN AMERICAN: >60
GFR NON-AFRICAN AMERICAN: >60
GLUCOSE BLD-MCNC: 102 MG/DL (ref 70–99)
HCT VFR BLD CALC: 46.8 % (ref 40.5–52.5)
HDLC SERPL-MCNC: 57 MG/DL (ref 40–60)
HEMOGLOBIN: 15.3 G/DL (ref 13.5–17.5)
LDL CHOLESTEROL CALCULATED: 124 MG/DL
LIPASE: 25 U/L (ref 13–60)
LYMPHOCYTES ABSOLUTE: 1.2 K/UL (ref 1–5.1)
LYMPHOCYTES RELATIVE PERCENT: 29.3 %
MCH RBC QN AUTO: 28.9 PG (ref 26–34)
MCHC RBC AUTO-ENTMCNC: 32.7 G/DL (ref 31–36)
MCV RBC AUTO: 88.2 FL (ref 80–100)
MONOCYTES ABSOLUTE: 0.4 K/UL (ref 0–1.3)
MONOCYTES RELATIVE PERCENT: 10.3 %
NEUTROPHILS ABSOLUTE: 2.3 K/UL (ref 1.7–7.7)
NEUTROPHILS RELATIVE PERCENT: 58.9 %
OVALOCYTES: ABNORMAL
PDW BLD-RTO: 14.4 % (ref 12.4–15.4)
PLATELET # BLD: 155 K/UL (ref 135–450)
PLATELET SLIDE REVIEW: ADEQUATE
PMV BLD AUTO: 10.6 FL (ref 5–10.5)
POIKILOCYTES: ABNORMAL
POTASSIUM SERPL-SCNC: 5.1 MMOL/L (ref 3.5–5.1)
PRO-BNP: ABNORMAL PG/ML (ref 0–449)
RBC # BLD: 5.3 M/UL (ref 4.2–5.9)
SODIUM BLD-SCNC: 145 MMOL/L (ref 136–145)
TOTAL PROTEIN: 6.9 G/DL (ref 6.4–8.2)
TRIGL SERPL-MCNC: 71 MG/DL (ref 0–150)
TROPONIN: <0.01 NG/ML
TSH REFLEX: 2.01 UIU/ML (ref 0.27–4.2)
VLDLC SERPL CALC-MCNC: 14 MG/DL
WBC # BLD: 4 K/UL (ref 4–11)

## 2022-10-26 ENCOUNTER — PROCEDURE VISIT (OUTPATIENT)
Dept: AUDIOLOGY | Age: 87
End: 2022-10-26
Payer: COMMERCIAL

## 2022-10-26 ENCOUNTER — OFFICE VISIT (OUTPATIENT)
Dept: ENT CLINIC | Age: 87
End: 2022-10-26
Payer: COMMERCIAL

## 2022-10-26 VITALS
SYSTOLIC BLOOD PRESSURE: 135 MMHG | HEIGHT: 74 IN | HEART RATE: 97 BPM | WEIGHT: 154.4 LBS | BODY MASS INDEX: 19.81 KG/M2 | DIASTOLIC BLOOD PRESSURE: 72 MMHG | TEMPERATURE: 97.7 F

## 2022-10-26 DIAGNOSIS — H90.3 SENSORINEURAL HEARING LOSS, BILATERAL: Primary | ICD-10-CM

## 2022-10-26 DIAGNOSIS — H61.23 BILATERAL IMPACTED CERUMEN: ICD-10-CM

## 2022-10-26 PROCEDURE — 92556 SPEECH AUDIOMETRY COMPLETE: CPT | Performed by: AUDIOLOGIST

## 2022-10-26 PROCEDURE — G0268 REMOVAL OF IMPACTED WAX MD: HCPCS | Performed by: OTOLARYNGOLOGY

## 2022-10-26 PROCEDURE — 3074F SYST BP LT 130 MM HG: CPT | Performed by: OTOLARYNGOLOGY

## 2022-10-26 PROCEDURE — 99212 OFFICE O/P EST SF 10 MIN: CPT | Performed by: OTOLARYNGOLOGY

## 2022-10-26 PROCEDURE — 1123F ACP DISCUSS/DSCN MKR DOCD: CPT | Performed by: OTOLARYNGOLOGY

## 2022-10-26 PROCEDURE — 3078F DIAST BP <80 MM HG: CPT | Performed by: OTOLARYNGOLOGY

## 2022-10-26 PROCEDURE — 92552 PURE TONE AUDIOMETRY AIR: CPT | Performed by: AUDIOLOGIST

## 2022-10-26 RX ORDER — TROSPIUM CHLORIDE ER 60 MG/1
60 CAPSULE ORAL DAILY
Status: ON HOLD | COMMUNITY
End: 2022-11-10 | Stop reason: HOSPADM

## 2022-10-26 NOTE — PROGRESS NOTES
Tesha Carr   1935, 82 y.o. male   [id number]       Referring Provider: Dwayne Whitman MD   Referral Type: In an order in [address] [place] [address]    Reason for Visit: Evaluation of suspected change in hearing, tinnitus, or balance. ADULT AUDIOLOGIC EVALUATION      Tesha Carr is a 82 y.o. male seen today, 10/26/2022, for a recheck audiologic evaluation. Patient was seen accompanied by his son. AUDIOLOGIC AND OTHER PERTINENT MEDICAL HISTORY:        Tesha Carr noted known bilateral sensorineural hearing loss with fair to poor word recognition; concern for decreased hearing, more difficulty with conversation; sees PT for balance. 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 Hearing loss is significant enough to result in difficulty understanding speech in most to all listening environments. Again discussed recommendation of hearing aid evaluation and recommended they contact his insurance to determine if there is a possible benefit for hearing aids. ASSESSMENT AND FINDINGS:       Otoscopy revealed: Clear ear canals bilaterally      RIGHT EAR:  Hearing Sensitivity: Moderate to moderately-severe sensorineural hearing loss. Speech Recognition Threshold: 70 dBHL  Word Recognition: Poor (64%), based on NU-6 25-word list at 90 dBHL using recorded speech stimuli. COMPARISON TO PREVIOUS TESTING COMPLETED on 12/23/2021 at [facility]: Stable hearing sensitivity and word recognition. LEFT EAR:  Hearing Sensitivity: Moderate to severe sensorineural hearing loss. Speech Recognition Threshold: 70 dBHL  Word Recognition: Poor (60-69%), based on NU-6 25-word list at 90 dBHL using recorded speech stimuli. COMPARISON TO PREVIOUS TESTING COMPLETED on 12/23/2021 at [facility]: Stable hearing sensitivity and word recognition. Reliability: Good  Transducer:  Inserts    See scanned audiogram dated 10/26/2022 for results. PATIENT EDUCATION:       The following items were discussed with the patient:   - Good Communication Strategies  - Hearing Loss and Hearing Aids    Educational information was shared in the After Visit Summary. RECOMMENDATIONS:                                                                                                                                                                                                                                                                      The following items are recommended based on patient report and results from today's appointment:  - Continue medical follow-up with Dave Catalan MD and Shanel Valdez MD.  - Retest hearing as medically indicated and/or sooner if a change in hearing is noted. - If desired, schedule a Hearing Aid Evaluation (HAE) appointment to discuss hearing aid options;  recommended he contact his insurance provider to determine if there is a possible benefit for hearing aids. - Utilize \"Good Communication Strategies\" as discussed to assist in speech understanding with communication partners.          [facility] [place], [place]  Audiologist       Chart CC'd to: Dave Catalan MD       Degree of   Hearing Sensitivity dB Range   Within Normal Limits (WNL) 0 - 20   Mild 20 - 40   Moderate 40 - 55   Moderately-Severe 55 - 70   Severe 70 - 90   Profound 90 +

## 2022-10-26 NOTE — Clinical Note
Dr. DIAZ [facility],  Thank you for your referral for audiologic testing on this patient. Today's results are consistent with stable bilateral sensorineural hearing loss in both ears; both ears with poor word recognition. Hearing loss is significant enough to result in difficulty understanding speech in most to all listening environments. Again discussed recommendation of hearing aid evaluation and recommended they contact his insurance to determine if there is a possible benefit for hearing aids. Please see ENT note from Dr. Quincy De Anda for additional details. If you have any questions, or if there is anything else you need, please let me know.    Becky Costello , [place] Audiologist --- [address] [facility] - Audiology

## 2022-10-26 NOTE — PROGRESS NOTES
FOLLOW UP VISIT:    CHIEF COMPLAINT: Sensorineural hearing loss. INTERIM HISTORY: I have been following Mr. Catalina Malik for sensorineural hearing loss for 7 years. His last test was 2021. He has a symmetrical flat sensorineural hearing loss at about 60 dB with discrimination scores in the high 60s and normal middle ear dynamics. He has no tinnitus. He has no vertigo attacks. The family is concerned that his hearing is becoming worse. PAST MEDICAL HISTORY:   Social History     Tobacco Use   Smoking Status Former    Types: Cigarettes    Quit date: 1975    Years since quittin.8   Smokeless Tobacco Never                                                    Social History     Substance and Sexual Activity   Alcohol Use Yes    Alcohol/week: 0.0 standard drinks                                                    Current Outpatient Medications:     tamsulosin (FLOMAX) 0.4 MG capsule, TAKE 1 CAPSULE BY MOUTH DAILY, Disp: 90 capsule, Rfl: 3    atorvastatin (LIPITOR) 40 MG tablet, Take 1 tablet by mouth daily for cholesterol., Disp: 90 tablet, Rfl: 3    lisinopril (PRINIVIL;ZESTRIL) 10 MG tablet, TAKE 1 TABLET BY MOUTH DAILY FOR BLOOD PRESSURE AND HEART, Disp: 90 tablet, Rfl: 0    metoprolol succinate (TOPROL XL) 25 MG extended release tablet, TAKE 1 TABLET BY MOUTH DAILY FOR BLOOD PRESSURE AND HEART, Disp: 90 tablet, Rfl: 3    oxybutynin (DITROPAN XL) 5 MG extended release tablet, Take 1 tablet by mouth daily, Disp: 30 tablet, Rfl: 3    furosemide (LASIX) 20 MG tablet, Take 1 tablet by mouth daily as needed (leg swelling.), Disp: 30 tablet, Rfl: 1    donepezil (ARICEPT) 10 MG tablet, Take 10 mg by mouth nightly, Disp: , Rfl:     clotrimazole-betamethasone (LOTRISONE) 1-0.05 % cream, Apply topically 2 times daily. , Disp: 15 g, Rfl: 0    sildenafil (VIAGRA) 100 MG tablet, Take 1 tablet by mouth as needed for Erectile Dysfunction, Disp: 6 tablet, Rfl: 5    therapeutic multivitamin-minerals (THERAGRAN-M) tablet, Take 1 tablet by mouth daily. , Disp: , Rfl:     aspirin 81 MG EC tablet, Take 81 mg by mouth daily. , Disp: , Rfl:     Cholecalciferol (VITAMIN D) 1000 UNIT CAPS, Take by mouth daily, Disp: , Rfl:                                                  Past Medical History:   Diagnosis Date    CAD (coronary artery disease)     Chest pain     Dyslipidemia     Fatigue     Hard of hearing     Hyperthyroidism     SOB (shortness of breath)     Vitamin D deficiency                                                     Past Surgical History:   Procedure Laterality Date    CARDIAC SURGERY         FAMILY HISTORY: Family history reviewed and except as pertinent to the interim history is not contributory. REVIEW OF SYSTEMS:  All pertinent positive and negative findings included in HPI. Otherwise, all other systems are reviewed and are negative    PHYSICAL EXAMINATION:   GENERAL: wdwn- no acute distress  RESPIRATORY: No stridor. COMMUNICATION :  Normal voice  MENTAL STATUS: Alert and oriented x3  HEAD AND FACE: No skin lesions of the face. EXTERNAL EARS AND NOSE: The external ears and nasal pyramid are normal.  FACIAL MUSCLES: All branches of the facial nerve intact. FACE PALPATION: Zygomatic arches and orbital rims are intact. No tenderness over the sinuses. EXTRAOCULAR MUSCLES: Intact with full range of motion. OTOSCOPY: Cerumen in both external auditory canals. Cerumen removed from both external auditory canals with curettes. TUNING FORKS:  Rinne ++ Dial midline at 512 Hz  AUDIOGRAM & TYMPANOGRAM ORDERED AND REVIEWED:      IMPRESSION: Little change in the sensorineural loss or in the level of auditory discrimination. PLAN: Reviewed audiogram with patient and his son. We will proceed with amplification. FOLLOW-UP: As needed.

## 2022-10-26 NOTE — PATIENT INSTRUCTIONS
Good Communication Strategies    Communication can be challenging for anyone, but can be especially difficult for those with some degree of hearing loss. While we may not be able to control every factor that may lead to difficulty with communication, there are Good Communication Strategies that we can all use in our day-to-day lives. Communication takes both parties working together for it to be successful. Tips as a Listener:   Control your environment. It is important to limit the amount of background noise in the room when possible. You should also consider having a good light source in the room to best see the other person. Ask for clarification. Instead of saying \"What?\", you can use parts of what you heard to make a new question. For example, if you heard the word \"Thursday\" but not the rest of the week, you may ask \"What was that about Thursday? \" or \"What did you want to do Thursday? \". This shows the person talking that you are listening and will help them better explain what they are saying. Be an advocate for yourself. If you are hearing but not understanding, tell the other person \"I can hear you, but I need you to slow down when you speak. \"  Or if someone is facing the other direction, say \"I cannot hear you when you are not looking at me when we talk. \"       Tips as a Talker:   - Sit or stand 3 to 6 feet away to maximize audibility         -- It is unrealistic to believe someone else will fully hear your message if you are speaking from across the room or in a different room in the house   - Stay at eye level to help with visual cues   - Make sure you have the persons attention before speaking   - Use facial expressions and gestures to accentuate your message   - Raise your voice slightly (do not scream)   - Speak slowly and distinctly   - Use short, simple sentences   - Rephrase your words if the person is having a hard time understanding you    - To avoid distortion, dont speak directly into a persons ear      Some additional items that may be helpful:   - Remain patient - this is important for both parties   - Write down items that still cannot be heard/understood. You may write with pen/paper or consider typing/texting on a cell phone or smart device. - If background noise is unavoidable, try to keep yourself in a good position in the room. By sitting at a de la cruz on the side of the restaurant (preferably a corner), it will be easier to communicate than if you were sitting at a table in the middle with background noise surrounding you. Keep yourself positioned away from music speakers or heavy foot traffic.   - If you have difficulty with the television, consider these options:      -- Use closed-captioning, which is a setting you can turn on that displays the spoken words in a written form on the screen. There may be a slight delay, but this can help fill in missing information. This can be especially helpful when watching programs with accented speech. -- Consider use of a sound bar or speakers that come from the front of the TV. With modern flat screen TVs, many of them have speakers that come out of the back of the device, which makes sound bounce off the wall behind it, then go into the room. Sound bars can allow the sound to go straight in your direction and can improve sound quality. -- Consider ear level devices to help improve the volume and/or sound quality of the program.  There are devices that work like headphones that you can adjust the volume for your ears while others can have the volume at a more comfortable level, such as \"TV Ears\". Most hearing aids have devices that allow them to connect directly to the TV and improve sound quality. Hearing Loss: Care Instructions  Your Care Instructions      Hearing loss is a sudden or slow decrease in how well you hear. It can range from mild to profound.  Permanent hearing loss can occur with aging, and it can happen when you are exposed long-term to loud noise. Examples include listening to loud music, riding motorcycles, or being around other loud machines. Hearing loss can affect your work and home life. It can make you feel lonely or depressed. You may feel that you have lost your independence. But hearing aids and other devices can help you hear better and feel connected to others. Follow-up care is a key part of your treatment and safety. Be sure to make and go to all appointments, and call your doctor if you are having problems. It's also a good idea to know your test results and keep a list of the medicines you take. How can you care for yourself at home? Avoid loud noises whenever possible. This helps keep your hearing from getting worse. Always wear hearing protection around loud noises. If appropriate, wear hearing aid(s) as directed. It is recommended that hearing aids are worn during all waking hours to keep your brain active and give it access to the sounds it is missing. If you are beginning your process with hearing aid(s), schedule a \"Hearing Aid Evaluation\" with an audiologist to discuss your lifestyle, features of hearing aid technology, and styles of hearing aids available. It is recommended that you contact your insurance company to determine if you have a hearing aid benefit, as this may dictate who you can see for these services. Have hearing tests as your doctor suggests. They can show whether your hearing has changed. Your hearing aid may need to be adjusted. Use other assistive devices as needed. These may include:  Telephone amplifiers and hearing aids that can connect to a television, stereo, radio, or microphone. Devices that use lights or vibrations. These alert you to the doorbell, a ringing telephone, or a baby monitor. Television closed-captioning. This shows the words at the bottom of the screen. Most new TVs can do this. TTY (text telephone).  This lets you type messages back and forth on the telephone instead of talking or listening. These devices are also called TDD. When messages are typed on the keyboard, they are sent over the phone line to a receiving TTY. The message is shown on a monitor. Use pagers, fax machines, text, and email if it is hard for you to communicate by telephone. Try to learn a listening technique called speech-reading. It is not lip-reading. You pay attention to people's gestures, expressions, posture, and tone of voice. These clues can help you understand what a person is saying. Face the person you are talking to, and have him or her face you. Make sure the lighting is good. You need to see the other person's face clearly. Think about counseling if you need help to adjust to your hearing loss. When should you call for help? Watch closely for changes in your health, and be sure to contact your doctor if:    You think your hearing is getting worse. You have new symptoms, such as dizziness or nausea.

## 2022-11-04 ENCOUNTER — HOSPITAL ENCOUNTER (INPATIENT)
Age: 87
LOS: 6 days | Discharge: HOME HEALTH | DRG: 291 | End: 2022-11-11
Attending: EMERGENCY MEDICINE | Admitting: INTERNAL MEDICINE
Payer: MEDICARE

## 2022-11-04 ENCOUNTER — APPOINTMENT (OUTPATIENT)
Dept: GENERAL RADIOLOGY | Age: 87
DRG: 291 | End: 2022-11-04
Payer: MEDICARE

## 2022-11-04 DIAGNOSIS — I50.41 ACUTE COMBINED SYSTOLIC AND DIASTOLIC CONGESTIVE HEART FAILURE (HCC): ICD-10-CM

## 2022-11-04 DIAGNOSIS — U07.1 COVID-19 VIRUS INFECTION: Primary | ICD-10-CM

## 2022-11-04 DIAGNOSIS — J90 BILATERAL PLEURAL EFFUSION: ICD-10-CM

## 2022-11-04 LAB
ANION GAP SERPL CALCULATED.3IONS-SCNC: 10 MMOL/L (ref 3–16)
BASE EXCESS VENOUS: 4.9 MMOL/L (ref -2–3)
BASOPHILS ABSOLUTE: 0 K/UL (ref 0–0.2)
BASOPHILS RELATIVE PERCENT: 0.3 %
BUN BLDV-MCNC: 19 MG/DL (ref 7–20)
CALCIUM SERPL-MCNC: 8.6 MG/DL (ref 8.3–10.6)
CARBOXYHEMOGLOBIN: 1.3 % (ref 0–1.5)
CHLORIDE BLD-SCNC: 105 MMOL/L (ref 99–110)
CO2: 27 MMOL/L (ref 21–32)
CREAT SERPL-MCNC: 1 MG/DL (ref 0.8–1.3)
EOSINOPHILS ABSOLUTE: 0 K/UL (ref 0–0.6)
EOSINOPHILS RELATIVE PERCENT: 0.3 %
GFR SERPL CREATININE-BSD FRML MDRD: >60 ML/MIN/{1.73_M2}
GLUCOSE BLD-MCNC: 90 MG/DL (ref 70–99)
HCO3 VENOUS: 29.9 MMOL/L (ref 24–28)
HCT VFR BLD CALC: 37.3 % (ref 40.5–52.5)
HEMOGLOBIN, VEN, REDUCED: 35.2 %
HEMOGLOBIN: 12.2 G/DL (ref 13.5–17.5)
INFLUENZA A: NOT DETECTED
INFLUENZA B: NOT DETECTED
LYMPHOCYTES ABSOLUTE: 0.8 K/UL (ref 1–5.1)
LYMPHOCYTES RELATIVE PERCENT: 14.3 %
MCH RBC QN AUTO: 28.6 PG (ref 26–34)
MCHC RBC AUTO-ENTMCNC: 32.7 G/DL (ref 31–36)
MCV RBC AUTO: 87.6 FL (ref 80–100)
METHEMOGLOBIN VENOUS: 0.6 % (ref 0–1.5)
MONOCYTES ABSOLUTE: 0.8 K/UL (ref 0–1.3)
MONOCYTES RELATIVE PERCENT: 13.7 %
NEUTROPHILS ABSOLUTE: 4 K/UL (ref 1.7–7.7)
NEUTROPHILS RELATIVE PERCENT: 71.4 %
O2 SAT, VEN: 64 %
PCO2, VEN: 45 MMHG (ref 41–51)
PDW BLD-RTO: 14.2 % (ref 12.4–15.4)
PH VENOUS: 7.43 (ref 7.35–7.45)
PLATELET # BLD: 150 K/UL (ref 135–450)
PMV BLD AUTO: 10.1 FL (ref 5–10.5)
PO2, VEN: 35.1 MMHG (ref 25–40)
POTASSIUM REFLEX MAGNESIUM: 4.1 MMOL/L (ref 3.5–5.1)
PRO-BNP: 8325 PG/ML (ref 0–449)
RBC # BLD: 4.26 M/UL (ref 4.2–5.9)
SARS-COV-2 RNA, RT PCR: DETECTED
SODIUM BLD-SCNC: 142 MMOL/L (ref 136–145)
TCO2 CALC VENOUS: 31 MMOL/L
TROPONIN: <0.01 NG/ML
WBC # BLD: 5.5 K/UL (ref 4–11)

## 2022-11-04 PROCEDURE — 83880 ASSAY OF NATRIURETIC PEPTIDE: CPT

## 2022-11-04 PROCEDURE — 80048 BASIC METABOLIC PNL TOTAL CA: CPT

## 2022-11-04 PROCEDURE — 84484 ASSAY OF TROPONIN QUANT: CPT

## 2022-11-04 PROCEDURE — 87636 SARSCOV2 & INF A&B AMP PRB: CPT

## 2022-11-04 PROCEDURE — 85025 COMPLETE CBC W/AUTO DIFF WBC: CPT

## 2022-11-04 PROCEDURE — 99285 EMERGENCY DEPT VISIT HI MDM: CPT

## 2022-11-04 PROCEDURE — 93005 ELECTROCARDIOGRAM TRACING: CPT | Performed by: PHYSICIAN ASSISTANT

## 2022-11-04 PROCEDURE — 71045 X-RAY EXAM CHEST 1 VIEW: CPT

## 2022-11-04 PROCEDURE — 82803 BLOOD GASES ANY COMBINATION: CPT

## 2022-11-04 RX ORDER — DONEPEZIL HYDROCHLORIDE 10 MG/1
1 TABLET, FILM COATED ORAL DAILY
Status: ON HOLD | COMMUNITY
Start: 2022-10-26 | End: 2022-11-10 | Stop reason: HOSPADM

## 2022-11-04 ASSESSMENT — ENCOUNTER SYMPTOMS
COLOR CHANGE: 0
GASTROINTESTINAL NEGATIVE: 1
RHINORRHEA: 0
NAUSEA: 0
DIARRHEA: 0
ABDOMINAL PAIN: 0
VOMITING: 0
RESPIRATORY NEGATIVE: 1
EYES NEGATIVE: 1
CONSTIPATION: 0
SORE THROAT: 0

## 2022-11-04 ASSESSMENT — PAIN - FUNCTIONAL ASSESSMENT: PAIN_FUNCTIONAL_ASSESSMENT: NONE - DENIES PAIN

## 2022-11-05 PROBLEM — R06.02 SHORTNESS OF BREATH: Status: ACTIVE | Noted: 2022-11-05

## 2022-11-05 PROBLEM — I50.9 CHF WITH UNKNOWN LVEF (HCC): Status: ACTIVE | Noted: 2022-11-05

## 2022-11-05 LAB
ANION GAP SERPL CALCULATED.3IONS-SCNC: 11 MMOL/L (ref 3–16)
BASOPHILS ABSOLUTE: 0 K/UL (ref 0–0.2)
BASOPHILS RELATIVE PERCENT: 0.2 %
BILIRUBIN URINE: NEGATIVE
BLOOD, URINE: NEGATIVE
BUN BLDV-MCNC: 18 MG/DL (ref 7–20)
CALCIUM SERPL-MCNC: 9 MG/DL (ref 8.3–10.6)
CHLORIDE BLD-SCNC: 105 MMOL/L (ref 99–110)
CLARITY: CLEAR
CO2: 26 MMOL/L (ref 21–32)
COLOR: YELLOW
CREAT SERPL-MCNC: 1 MG/DL (ref 0.8–1.3)
EKG ATRIAL RATE: 92 BPM
EKG DIAGNOSIS: NORMAL
EKG P AXIS: 61 DEGREES
EKG P-R INTERVAL: 150 MS
EKG Q-T INTERVAL: 410 MS
EKG QRS DURATION: 142 MS
EKG QTC CALCULATION (BAZETT): 507 MS
EKG R AXIS: 265 DEGREES
EKG T AXIS: 48 DEGREES
EKG VENTRICULAR RATE: 92 BPM
EOSINOPHILS ABSOLUTE: 0 K/UL (ref 0–0.6)
EOSINOPHILS RELATIVE PERCENT: 0.3 %
GFR SERPL CREATININE-BSD FRML MDRD: >60 ML/MIN/{1.73_M2}
GLUCOSE BLD-MCNC: 89 MG/DL (ref 70–99)
GLUCOSE URINE: NEGATIVE MG/DL
HCT VFR BLD CALC: 39.1 % (ref 40.5–52.5)
HEMOGLOBIN: 12.7 G/DL (ref 13.5–17.5)
KETONES, URINE: NEGATIVE MG/DL
LEUKOCYTE ESTERASE, URINE: NEGATIVE
LYMPHOCYTES ABSOLUTE: 0.9 K/UL (ref 1–5.1)
LYMPHOCYTES RELATIVE PERCENT: 18.7 %
MCH RBC QN AUTO: 28.5 PG (ref 26–34)
MCHC RBC AUTO-ENTMCNC: 32.5 G/DL (ref 31–36)
MCV RBC AUTO: 87.6 FL (ref 80–100)
MICROSCOPIC EXAMINATION: YES
MONOCYTES ABSOLUTE: 0.8 K/UL (ref 0–1.3)
MONOCYTES RELATIVE PERCENT: 15.8 %
NEUTROPHILS ABSOLUTE: 3.1 K/UL (ref 1.7–7.7)
NEUTROPHILS RELATIVE PERCENT: 65 %
NITRITE, URINE: NEGATIVE
PDW BLD-RTO: 14.2 % (ref 12.4–15.4)
PH UA: 6 (ref 5–8)
PLATELET # BLD: 148 K/UL (ref 135–450)
PLATELET SLIDE REVIEW: ADEQUATE
PMV BLD AUTO: 10 FL (ref 5–10.5)
POTASSIUM REFLEX MAGNESIUM: 4.4 MMOL/L (ref 3.5–5.1)
PROTEIN UA: ABNORMAL MG/DL
RBC # BLD: 4.46 M/UL (ref 4.2–5.9)
RBC UA: ABNORMAL /HPF (ref 0–4)
REASON FOR REJECTION: NORMAL
REJECTED TEST: NORMAL
SLIDE REVIEW: ABNORMAL
SODIUM BLD-SCNC: 142 MMOL/L (ref 136–145)
SPECIFIC GRAVITY UA: 1.02 (ref 1–1.03)
URINE TYPE: ABNORMAL
UROBILINOGEN, URINE: 0.2 E.U./DL
WBC # BLD: 4.8 K/UL (ref 4–11)
WBC UA: ABNORMAL /HPF (ref 0–5)

## 2022-11-05 PROCEDURE — 80048 BASIC METABOLIC PNL TOTAL CA: CPT

## 2022-11-05 PROCEDURE — 1200000000 HC SEMI PRIVATE

## 2022-11-05 PROCEDURE — 85025 COMPLETE CBC W/AUTO DIFF WBC: CPT

## 2022-11-05 PROCEDURE — 99223 1ST HOSP IP/OBS HIGH 75: CPT | Performed by: INTERNAL MEDICINE

## 2022-11-05 PROCEDURE — 2580000003 HC RX 258: Performed by: INTERNAL MEDICINE

## 2022-11-05 PROCEDURE — 36415 COLL VENOUS BLD VENIPUNCTURE: CPT

## 2022-11-05 PROCEDURE — 6360000002 HC RX W HCPCS: Performed by: PHYSICIAN ASSISTANT

## 2022-11-05 PROCEDURE — 6370000000 HC RX 637 (ALT 250 FOR IP): Performed by: INTERNAL MEDICINE

## 2022-11-05 PROCEDURE — 81001 URINALYSIS AUTO W/SCOPE: CPT

## 2022-11-05 PROCEDURE — 6360000002 HC RX W HCPCS: Performed by: INTERNAL MEDICINE

## 2022-11-05 RX ORDER — LISINOPRIL 10 MG/1
10 TABLET ORAL DAILY
Status: DISCONTINUED | OUTPATIENT
Start: 2022-11-05 | End: 2022-11-09

## 2022-11-05 RX ORDER — ACETAMINOPHEN 325 MG/1
650 TABLET ORAL EVERY 6 HOURS PRN
Status: DISCONTINUED | OUTPATIENT
Start: 2022-11-05 | End: 2022-11-11 | Stop reason: HOSPADM

## 2022-11-05 RX ORDER — TAMSULOSIN HYDROCHLORIDE 0.4 MG/1
0.4 CAPSULE ORAL DAILY
Status: DISCONTINUED | OUTPATIENT
Start: 2022-11-05 | End: 2022-11-11 | Stop reason: HOSPADM

## 2022-11-05 RX ORDER — ACETAMINOPHEN 650 MG/1
650 SUPPOSITORY RECTAL EVERY 6 HOURS PRN
Status: DISCONTINUED | OUTPATIENT
Start: 2022-11-05 | End: 2022-11-11 | Stop reason: HOSPADM

## 2022-11-05 RX ORDER — SODIUM CHLORIDE 0.9 % (FLUSH) 0.9 %
5-40 SYRINGE (ML) INJECTION EVERY 12 HOURS SCHEDULED
Status: DISCONTINUED | OUTPATIENT
Start: 2022-11-05 | End: 2022-11-11 | Stop reason: HOSPADM

## 2022-11-05 RX ORDER — ONDANSETRON 4 MG/1
4 TABLET, ORALLY DISINTEGRATING ORAL EVERY 8 HOURS PRN
Status: DISCONTINUED | OUTPATIENT
Start: 2022-11-05 | End: 2022-11-11 | Stop reason: HOSPADM

## 2022-11-05 RX ORDER — ATORVASTATIN CALCIUM 40 MG/1
40 TABLET, FILM COATED ORAL DAILY
Status: DISCONTINUED | OUTPATIENT
Start: 2022-11-05 | End: 2022-11-11 | Stop reason: HOSPADM

## 2022-11-05 RX ORDER — ASPIRIN 81 MG/1
81 TABLET, CHEWABLE ORAL DAILY
Status: DISCONTINUED | OUTPATIENT
Start: 2022-11-05 | End: 2022-11-11 | Stop reason: HOSPADM

## 2022-11-05 RX ORDER — FUROSEMIDE 10 MG/ML
40 INJECTION INTRAMUSCULAR; INTRAVENOUS 2 TIMES DAILY
Status: DISCONTINUED | OUTPATIENT
Start: 2022-11-05 | End: 2022-11-09

## 2022-11-05 RX ORDER — ENOXAPARIN SODIUM 100 MG/ML
40 INJECTION SUBCUTANEOUS DAILY
Status: DISCONTINUED | OUTPATIENT
Start: 2022-11-05 | End: 2022-11-11 | Stop reason: HOSPADM

## 2022-11-05 RX ORDER — METOPROLOL SUCCINATE 25 MG/1
25 TABLET, EXTENDED RELEASE ORAL DAILY
Status: DISCONTINUED | OUTPATIENT
Start: 2022-11-05 | End: 2022-11-08

## 2022-11-05 RX ORDER — SODIUM CHLORIDE 9 MG/ML
INJECTION, SOLUTION INTRAVENOUS PRN
Status: DISCONTINUED | OUTPATIENT
Start: 2022-11-05 | End: 2022-11-11 | Stop reason: HOSPADM

## 2022-11-05 RX ORDER — FUROSEMIDE 10 MG/ML
40 INJECTION INTRAMUSCULAR; INTRAVENOUS ONCE
Status: COMPLETED | OUTPATIENT
Start: 2022-11-05 | End: 2022-11-05

## 2022-11-05 RX ORDER — OXYBUTYNIN CHLORIDE 5 MG/1
5 TABLET, EXTENDED RELEASE ORAL DAILY
Status: DISCONTINUED | OUTPATIENT
Start: 2022-11-05 | End: 2022-11-11 | Stop reason: HOSPADM

## 2022-11-05 RX ORDER — TAMSULOSIN HYDROCHLORIDE 0.4 MG/1
0.4 CAPSULE ORAL DAILY
COMMUNITY

## 2022-11-05 RX ORDER — POLYETHYLENE GLYCOL 3350 17 G/17G
17 POWDER, FOR SOLUTION ORAL DAILY PRN
Status: DISCONTINUED | OUTPATIENT
Start: 2022-11-05 | End: 2022-11-11 | Stop reason: HOSPADM

## 2022-11-05 RX ORDER — SODIUM CHLORIDE 0.9 % (FLUSH) 0.9 %
5-40 SYRINGE (ML) INJECTION PRN
Status: DISCONTINUED | OUTPATIENT
Start: 2022-11-05 | End: 2022-11-11 | Stop reason: HOSPADM

## 2022-11-05 RX ORDER — ONDANSETRON 2 MG/ML
4 INJECTION INTRAMUSCULAR; INTRAVENOUS EVERY 6 HOURS PRN
Status: DISCONTINUED | OUTPATIENT
Start: 2022-11-05 | End: 2022-11-11 | Stop reason: HOSPADM

## 2022-11-05 RX ADMIN — FUROSEMIDE 40 MG: 10 INJECTION, SOLUTION INTRAMUSCULAR; INTRAVENOUS at 18:43

## 2022-11-05 RX ADMIN — ASPIRIN 81 MG 81 MG: 81 TABLET ORAL at 10:08

## 2022-11-05 RX ADMIN — TAMSULOSIN HYDROCHLORIDE 0.4 MG: 0.4 CAPSULE ORAL at 08:30

## 2022-11-05 RX ADMIN — OXYBUTYNIN CHLORIDE 5 MG: 5 TABLET, EXTENDED RELEASE ORAL at 08:30

## 2022-11-05 RX ADMIN — FUROSEMIDE 40 MG: 10 INJECTION, SOLUTION INTRAMUSCULAR; INTRAVENOUS at 08:32

## 2022-11-05 RX ADMIN — LISINOPRIL 10 MG: 10 TABLET ORAL at 08:30

## 2022-11-05 RX ADMIN — SODIUM CHLORIDE, PRESERVATIVE FREE 10 ML: 5 INJECTION INTRAVENOUS at 23:35

## 2022-11-05 RX ADMIN — ENOXAPARIN SODIUM 40 MG: 100 INJECTION SUBCUTANEOUS at 08:30

## 2022-11-05 RX ADMIN — SODIUM CHLORIDE, PRESERVATIVE FREE 10 ML: 5 INJECTION INTRAVENOUS at 08:30

## 2022-11-05 RX ADMIN — METOPROLOL SUCCINATE 25 MG: 25 TABLET, EXTENDED RELEASE ORAL at 08:30

## 2022-11-05 RX ADMIN — ATORVASTATIN CALCIUM 40 MG: 40 TABLET, FILM COATED ORAL at 08:30

## 2022-11-05 RX ADMIN — FUROSEMIDE 40 MG: 10 INJECTION, SOLUTION INTRAMUSCULAR; INTRAVENOUS at 01:09

## 2022-11-05 ASSESSMENT — ENCOUNTER SYMPTOMS
COUGH: 1
SHORTNESS OF BREATH: 1

## 2022-11-05 NOTE — PROGRESS NOTES
4 Eyes Admission Assessment     I agree as the admission nurse that 2 RN's have performed a thorough Head to Toe Skin Assessment on the patient. ALL assessment sites listed below have been assessed on admission. Areas assessed by both nurses: Conrad and Loyda    [x]   Head, Face, and Ears   [x]   Shoulders, Back, and Chest  [x]   Arms, Elbows, and Hands   [x]   Coccyx, Sacrum, and Ischium  [x]   Legs, Feet, and Heels        Does the Patient have Skin Breakdown?   No         Jaime Prevention initiated:  No   Wound Care Orders initiated:  No      [facility] nurse consulted for Pressure Injury (Stage 3,4, Unstageable, DTI, NWPT, and Complex wounds) or Jaime score 18 or lower:  No      Nurse 1 eSignature: Electronically signed by Elizabeth Bruce RN on 11/5/22 at 2:44 AM EDT    **SHARE this note so that the co-signing nurse is able to place an eSignature**    Nurse 2 eSignature: Electronically signed by Iron Price RN on 11/5/22 at 5:17 AM EDT

## 2022-11-05 NOTE — ED PROVIDER NOTES
ED Attending Attestation Note     Date of evaluation: 11/4/2022    This patient was seen by the resident. I have seen and examined the patient, agree with the workup, evaluation, management and diagnosis. The care plan has been discussed. I have reviewed the ECG and concur with the resident's interpretation. My assessment reveals a pleasant 47-year-old male presented with increasing lower extremity edema, confusion and unsteadiness. States that he is urinating more often and his mind is fuzzy year over the last few months. He went to her primary care physician for bilateral lower extremity more started on Lasix. However his son reports that he was increasingly unsteady today, and had difficulty walking so he brought him here for evaluation. On examination the patient is awake alert, with full strength in his upper or lower extremities but has 1+ pitting edema bilaterally to the midshin. He has no respiratory distress, and normal work of breathing. Shaji Gu MD  11/04/22 [id number]

## 2022-11-05 NOTE — PLAN OF CARE
Problem: Discharge Planning  Goal: Discharge to home or other facility with appropriate resources  Outcome: Progressing  Flowsheets  Taken 11/5/2022 1858  Discharge to home or other facility with appropriate resources: Identify barriers to discharge with patient and caregiver  Taken 11/5/2022 0824  Discharge to home or other facility with appropriate resources: Identify barriers to discharge with patient and caregiver     Problem: Skin/Tissue Integrity  Goal: Absence of new skin breakdown  Description: 1. Monitor for areas of redness and/or skin breakdown  2. Assess vascular access sites hourly  3. Every 4-6 hours minimum:  Change oxygen saturation probe site  4. Every 4-6 hours:  If on nasal continuous positive airway pressure, respiratory therapy assess nares and determine need for appliance change or resting period.   Outcome: Progressing     Problem: ABCDS Injury Assessment  Goal: Absence of physical injury  Outcome: Progressing  Flowsheets  Taken 11/5/2022 1858  Absence of Physical Injury: Implement safety measures based on patient assessment  Taken 11/5/2022 0800  Absence of Physical Injury: Implement safety measures based on patient assessment     Problem: Safety - Adult  Goal: Free from fall injury  Outcome: Progressing  Flowsheets  Taken 11/5/2022 1858  Free From Fall Injury: Based on caregiver fall risk screen, instruct family/caregiver to ask for assistance with transferring infant if caregiver noted to have fall risk factors  Taken 11/5/2022 0800  Free From Fall Injury: Instruct family/caregiver on patient safety     Problem: Chronic Conditions and Co-morbidities  Goal: Patient's chronic conditions and co-morbidity symptoms are monitored and maintained or improved  Outcome: Progressing  Flowsheets  Taken 11/5/2022 [address] - Patient's Chronic Conditions and Co-Morbidity Symptoms are Monitored and Maintained or Improved: Monitor and assess patient's chronic conditions and comorbid symptoms for stability, deterioration, or improvement  Taken 11/5/2022 7167  Care Plan - Patient's Chronic Conditions and Co-Morbidity Symptoms are Monitored and Maintained or Improved: Monitor and assess patient's chronic conditions and comorbid symptoms for stability, deterioration, or improvement

## 2022-11-05 NOTE — ED PROVIDER NOTES
[facility]          PHYSICIAN ASSISTANT NOTE       Date of evaluation: 11/4/2022    Chief Complaint     Dizziness (Pt c/o of dizziness when standing but sts that it has been going on \"for months\" and also c/o having trouble remembering things, specifically remembering locations of items he needs. Sts that this has also been going on \" for months\" but progressively getting worse. Sts that \"my children seem to think it is much worse thank it is\")      History of Present Illness     Bart Whitmore is a 82 y.o. male, with a history of hypertension, dyslipidemia, coronary artery disease post remote CABG and dementia, who presents to the ED with his son states over the last few days he has not been himself. He has been less active and occasionally \"talking out of his head\". The patient has a history of balance disorder but this seems to be worse over the last couple of days as well. He has not fallen. Over the last 3 to 4 weeks he has noted swelling in his ankles that has progressively worsened despite taking Lasix that was newly prescribed. He has appeared short of breath at times for several weeks and over the last couple of days, has had a mild cough. The patient states he is forgetful, feels off balance and urinates a lot. His son states these are chronic problems. The patient denies pain, has had no nausea, vomiting or changes in bowel habits. He otherwise has no complaints. Review of Systems     Review of Systems   Constitutional:  Positive for fatigue. Negative for chills and fever. HENT:  Negative for congestion, rhinorrhea and sore throat. Respiratory:  Positive for cough and shortness of breath. Cardiovascular:  Positive for leg swelling. Negative for chest pain. Gastrointestinal:  Negative for abdominal pain, constipation, diarrhea, nausea and vomiting. Genitourinary:  Negative for dysuria, frequency, hematuria and urgency.    Musculoskeletal: Positive for gait problem (balance disorder). Negative for arthralgias and myalgias. Skin:  Negative for color change and rash. Neurological:  Negative for dizziness, speech difficulty, weakness, light-headedness, numbness and headaches. Psychiatric/Behavioral:  Positive for confusion. All other systems reviewed and are negative. Past Medical, Surgical, Family, and Social History     He has a past medical history of Arthritis, Balance disorder, CAD (coronary artery disease), Chest pain, Dental disease, Dizziness, Dyslipidemia, Fatigue, Hard of hearing, History of prostate cancer, Hypertension, Hyperthyroidism, Impaired memory, SOB (shortness of breath), and Vitamin D deficiency. He has a past surgical history that includes Cardiac surgery. His family history includes Heart Disease in his father and mother. He reports that he quit smoking about 47 years ago. His smoking use included cigarettes. He has never used smokeless tobacco. He reports current alcohol use. He reports that he does not use drugs. Medications     Previous Medications    ATORVASTATIN (LIPITOR) 40 MG TABLET    Take 1 tablet by mouth daily for cholesterol. DONEPEZIL (ARICEPT) 10 MG TABLET    Take 1 tablet by mouth daily    FUROSEMIDE (LASIX) 20 MG TABLET    Take 1 tablet by mouth daily as needed (leg swelling.)    LISINOPRIL (PRINIVIL;ZESTRIL) 10 MG TABLET    TAKE 1 TABLET BY MOUTH DAILY FOR BLOOD PRESSURE AND HEART    METOPROLOL SUCCINATE (TOPROL XL) 25 MG EXTENDED RELEASE TABLET    TAKE 1 TABLET BY MOUTH DAILY FOR BLOOD PRESSURE AND HEART    OXYBUTYNIN (DITROPAN XL) 5 MG EXTENDED RELEASE TABLET    Take 1 tablet by mouth daily    TAMSULOSIN (FLOMAX) 0.4 MG CAPSULE    Take 0.4 mg by mouth daily    TROSPIUM (SANCTURA) 60 MG CP24 EXTENDED RELEASE CAPSULE    Take 60 mg by mouth daily       Allergies     He has No Known Allergies.     Physical Exam     INITIAL VITALS: BP: (!) 140/87, Temp: 99.1 °F (37.3 °C), Heart Rate: 92, Resp: 25, SpO2: 100 %  Physical Exam  Vitals reviewed. Constitutional:       General: He is not in acute distress. Appearance: He is well-developed and underweight. HENT:      Head: Normocephalic and atraumatic. Mouth/Throat:      Mouth: Mucous membranes are moist.   Eyes:      Extraocular Movements: Extraocular movements intact. Conjunctiva/sclera: Conjunctivae normal.      Pupils: Pupils are equal, round, and reactive to light. Neck:      Trachea: Phonation normal.   Cardiovascular:      Rate and Rhythm: Regular rhythm. Tachycardia present. Heart sounds: No murmur heard. No friction rub. No gallop. Pulmonary:      Effort: Pulmonary effort is normal. Tachypnea present. Breath sounds: No wheezing, rhonchi or rales. Abdominal:      General: There is no distension. Palpations: Abdomen is soft. Tenderness: There is no abdominal tenderness. Musculoskeletal:      Cervical back: Normal range of motion and neck supple. Right lower le+ Pitting Edema present. Left lower le+ Pitting Edema present. Skin:     General: Skin is warm and dry. Findings: No petechiae or rash. Neurological:      Mental Status: He is alert and oriented to person, place, and time. Cranial Nerves: Cranial nerves 2-12 are intact. Motor: Motor function is intact. Coordination: Coordination is intact. Comments: Alert and oriented to person, place, day, month and year.    Psychiatric:         Mood and Affect: Mood and affect normal.         Behavior: Behavior normal.       Diagnostic Results     EKG   Interpreted in conjunction with emergency department physician Scott Holguin, *  Rhythm: normal sinus   Rate: normal  Axis: right  Ectopy: none  Conduction: right bundle branch block (complete)  ST Segments: no acute change  T Waves: no acute change  Q Waves:inferior leads  Clinical Impression: no acute changes  Comparison:  10/5/2022    RECENT VITALS:  BP: 126/70, Temp: 99.1 °F (37.3 °C), Heart Rate: 85, Resp: 17, SpO2: 100 %       ED Course     Nursing Notes, Past Medical Hx,Past Surgical Hx, Social Hx, Allergies, and Family Hx were reviewed. The patient was given the following medications:  Orders Placed This Encounter   Medications    furosemide (LASIX) injection 40 mg         CONSULTS:  Marvin 26 / DARY / [place] is a 82 y.o. male presenting with a several week history of lower extremity swelling and shortness of breath with new cough with increased fatigue and balance disorder of the last couple of days. The patient is very hard of hearing and a difficult historian much of the history was obtained per family. He is mildly tachypneic on exam with 2+ bilateral pitting edema. No neurologic deficits. IV access was established. Labs include a CBC with an H&H of 12 and 37, down from 15 and 47 three weeks ago. Renal panel is unremarkable. Troponin is negative. BNP is 8000, down from 10,000 three weeks ago. Viral swab is positive for COVID. Repeat is unremarkable. Chest x-ray shows small bilateral pleural effusions and bibasilar airspace disease, greater on the left. Consider pneumonia versus atelectasis. The patient does have evidence of fluid overload with bilateral lower extremity edema as well as pleural effusions on his chest x-ray but not hypoxic. He is given IV Lasix. Department. He does have a positive COVID test as well, likely contributing to his increased fatigue and cough. The patient will be admitted for additional evaluation and management to include gentle diuresis. He is in stable condition upon waiting transport to the floor. This patient was also evaluated by the attending physician. All care plans were discussed and agreed upon. Clinical Impression     1. COVID-19 virus infection    2.  Bilateral pleural effusion        Disposition       DISPOSITION Decision To Admit 11/05/2022 12:37:35 AM      EVARISTO Elliott  11/05/22 7395

## 2022-11-05 NOTE — CONSULTS
Reason for Consultation/Chief Complaint:  Dizziness, MS changes      History of Present Illness:  Bridgett Kirby is a 82 y.o. patient whom we were asked to see for CHF/edema/sob/MS changes. He is currently unable to give hx. Per chart pt has been less active and at times talking out of his head. This am he is confused. Noted to have increase in swelling in ankles. Kraig possible sob. Also with mild cough      Past Medical History:   has a past medical history of Arthritis, Balance disorder, CAD (coronary artery disease), Chest pain, Dental disease, Dizziness, Dyslipidemia, Fatigue, Hard of hearing, History of prostate cancer, Hypertension, Hyperthyroidism, Impaired memory, SOB (shortness of breath), and Vitamin D deficiency. Surgical History:   has a past surgical history that includes Cardiac surgery. Social History:   reports that he quit smoking about 47 years ago. His smoking use included cigarettes. He has never used smokeless tobacco. He reports current alcohol use. He reports that he does not use drugs. Family History:  No evidence for sudden cardiac death or premature CAD    Home Medications:  Were reviewed and are listed in nursing record. and/or listed below  Prior to Admission medications    Medication Sig Start Date End Date Taking? Authorizing Provider   tamsulosin (FLOMAX) 0.4 MG capsule Take 0.4 mg by mouth daily   Yes Historical Provider, MD   donepezil (ARICEPT) 10 MG tablet Take 1 tablet by mouth daily  Patient not taking: Reported on 11/5/2022 10/26/22   Historical Provider, MD   trospium (SANCTURA) 60 MG CP24 extended release capsule Take 60 mg by mouth daily  Patient not taking: Reported on 11/5/2022    Historical Provider, MD   atorvastatin (LIPITOR) 40 MG tablet Take 1 tablet by mouth daily for cholesterol.  9/23/22 12/22/22  Dave Catalan MD   lisinopril (PRINIVIL;ZESTRIL) 10 MG tablet TAKE 1 TABLET BY MOUTH DAILY FOR BLOOD PRESSURE AND HEART 9/21/22 12/20/22  Dave Catalan MD metoprolol succinate (TOPROL XL) 25 MG extended release tablet TAKE 1 TABLET BY MOUTH DAILY FOR BLOOD PRESSURE AND HEART 9/21/22 12/20/22  Syeda Ray MD   oxybutynin (DITROPAN XL) 5 MG extended release tablet Take 1 tablet by mouth daily 9/1/22   Syeda Ray MD   furosemide (LASIX) 20 MG tablet Take 1 tablet by mouth daily as needed (leg swelling.) 9/1/22   Syeda Ray MD        Allergies:  Patient has no known allergies. Review of Systems:   Unable, somewhat confused. Answering questions with numbers at times. Physical Examination:    Vitals:    11/05/22 0402   BP: 134/70   Pulse: 64   Resp: 18   Temp: 98.1 °F (36.7 °C)   SpO2: 99%    Weight: 162 lb 14.7 oz (73.9 kg)         General Appearance:  Awakens, no distress, appears stated age   Head:  Normocephalic, without obvious abnormality, atraumatic   Eyes:  PERRL, conjunctiva/corneas clear       Nose: Nares normal, no drainage or sinus tenderness   Throat: Lips, mucosa, and tongue normal   Neck: Supple, symmetrical, trachea midline       Lungs:   Decreased BS, respirations unlabored   Chest Wall:  No tenderness or deformity   Heart:  Regular rate and rhythm, S1, S2 normal, no murmur, rub or gallop   Abdomen:   Soft, non-tender, bowel sounds active all four quadrants           Extremities: Extremities normal, atraumatic, no cyanosis.  + edema       Skin: Skin color, texture, turgor normal, no rashes or lesions       Neurologic: Normal gross motor and sensory exam.         Labs  CBC:   Lab Results   Component Value Date/Time    WBC 5.5 11/04/2022 10:50 PM    RBC 4.26 11/04/2022 10:50 PM    HGB 12.2 11/04/2022 10:50 PM    HCT 37.3 11/04/2022 10:50 PM    MCV 87.6 11/04/2022 10:50 PM    RDW 14.2 11/04/2022 10:50 PM     11/04/2022 10:50 PM     CMP:    Lab Results   Component Value Date/Time     11/05/2022 05:15 AM    K 4.4 11/05/2022 05:15 AM     11/05/2022 05:15 AM    CO2 26 11/05/2022 05:15 AM    BUN 18 11/05/2022 05:15 AM CREATININE 1.0 11/05/2022 05:15 AM    GFRAA >60 10/12/2022 12:29 PM    GFRAA >60 04/17/2013 10:13 AM    AGRATIO 1.4 10/12/2022 12:29 PM    LABGLOM >60 11/05/2022 05:15 AM    GLUCOSE 89 11/05/2022 05:15 AM    PROT 6.9 10/12/2022 12:29 PM    PROT 7.2 07/11/2012 09:51 AM    CALCIUM 9.0 11/05/2022 05:15 AM    BILITOT 0.4 10/12/2022 12:29 PM    ALKPHOS 90 10/12/2022 12:29 PM    AST 22 10/12/2022 12:29 PM    ALT 28 10/12/2022 12:29 PM     PT/INR:  No results found for: PTINR  Lab Results   Component Value Date    TROPONINI <0.01 11/04/2022       EKG:  I have reviewed EKG with the following interpretation:  Impression:  personally reviewed, NSR, RBBB, Inf MI AU. Assessment  Patient Active Problem List   Diagnosis    Hypertension    Hyperlipidemia    CAD (coronary artery disease)    BPH (benign prostatic hyperplasia)    Elevated PSA    Prostate cancer (HCC)    Primary osteoarthritis of left knee    Contusion of left knee    Tremor of right hand    Balance disorder    Impaired memory    Shortness of breath    CHF with unknown LVEF (HCC)         Plan:    Elevated BNP/edema consistent with CHF. Uncertain EF since has not had eval recently. Will have echo. Gentle diuresis with IV lasix. Continue IV lasix. Watch cr. COVID +. I/O , daily wts.

## 2022-11-05 NOTE — H&P
Hospital Medicine History & Physical      PCP: Elena Mae MD    Date of Admission: 11/4/2022    Date of Service: Pt seen/examined on 11/4/2022    Chief Complaint:      Chief Complaint   Patient presents with    Dizziness     Pt c/o of dizziness when standing but sts that it has been going on \"for months\" and also c/o having trouble remembering things, specifically remembering locations of items he needs. Sts that this has also been going on \" for months\" but progressively getting worse. Sts that \"my children seem to think it is much worse than it is\"       History Of Present Illness: The patient is a 24-year-old male with past medical history of coronary artery disease status post CABG, hypertension, benign prostatic hyperplasia, dementia presented to the hospital due to dizziness, shortness of breath. Patient is a poor historian and unable to provide most of the history. He does state that he has been feeling short of breath over the last 3 to 4 weeks. He has developed worsening lower extremity edema and this. .  He denies any fevers, chills, nausea or vomiting. On arrival to the hospital patient was noted to be hemodynamically stable. Lab work showed elevated proBNP of 8000. He had a chest x-ray performed which showed bilateral pleural effusions. Patient was admitted to the hospital for further work-up and management. Past Medical History:        Diagnosis Date    Arthritis     Balance disorder     CAD (coronary artery disease)     Chest pain     Dental disease     Dizziness     Dyslipidemia     Fatigue     Hard of hearing     History of prostate cancer     Hypertension     Hyperthyroidism     Impaired memory     SOB (shortness of breath)     Vitamin D deficiency        Past Surgical History:        Procedure Laterality Date    CARDIAC SURGERY         Medications Prior to Admission:    Prior to Admission medications    Medication Sig Start Date End Date Taking?  Authorizing Provider tamsulosin (FLOMAX) 0.4 MG capsule Take 0.4 mg by mouth daily   Yes Historical Provider, MD   donepezil (ARICEPT) 10 MG tablet Take 1 tablet by mouth daily  Patient not taking: Reported on 11/5/2022 10/26/22   Historical Provider, MD   trospium (SANCTURA) 60 MG CP24 extended release capsule Take 60 mg by mouth daily  Patient not taking: Reported on 11/5/2022    Historical Provider, MD   atorvastatin (LIPITOR) 40 MG tablet Take 1 tablet by mouth daily for cholesterol. 9/23/22 12/22/22  Nathen Tony MD   lisinopril (PRINIVIL;ZESTRIL) 10 MG tablet TAKE 1 TABLET BY MOUTH DAILY FOR BLOOD PRESSURE AND HEART 9/21/22 12/20/22  Nathen Tony MD   metoprolol succinate (TOPROL XL) 25 MG extended release tablet TAKE 1 TABLET BY MOUTH DAILY FOR BLOOD PRESSURE AND HEART 9/21/22 12/20/22  Nathen Tony MD   oxybutynin (DITROPAN XL) 5 MG extended release tablet Take 1 tablet by mouth daily 9/1/22   Nathen Tony MD   furosemide (LASIX) 20 MG tablet Take 1 tablet by mouth daily as needed (leg swelling.) 9/1/22   Nathen Tony MD       Allergies:  Patient has no known allergies. Social History:       reports that he quit smoking about 47 years ago. His smoking use included cigarettes. He has never used smokeless tobacco. He reports current alcohol use. He reports that he does not use drugs. Family History:  Reviewed in detail and negative for DM, Early CAD, Cancer, CVA. Positive as follows:        Problem Relation Age of Onset    Heart Disease Mother     Heart Disease Father        REVIEW OF SYSTEMS:   Positive review  noted in the HPI. All other systems reviewed and negative.     PHYSICAL EXAM:    /70   Pulse 64   Temp 98.1 °F (36.7 °C) (Oral)   Resp 18   Ht 6' 2\" (1.88 m)   Wt 162 lb 14.7 oz (73.9 kg)   SpO2 99%   BMI 20.92 kg/m²   General Appearance: alert and oriented to person, place and time, well developed and well- nourished, in no acute distress  Skin: warm and dry, no rash or erythema  Head: normocephalic and atraumatic  Eyes: pupils equal, round, and reactive to light, extraocular eye movements intact, conjunctivae normal  ENT: tympanic membrane, external ear and ear canal normal bilaterally, nose without deformity, nasal mucosa and turbinates normal without polyps  Neck: supple and non-tender without mass, no thyromegaly or thyroid nodules, no cervical lymphadenopathy  Pulmonary/Chest: clear to auscultation bilaterally- no wheezes, rales or rhonchi, normal air movement, no respiratory distress  Cardiovascular: normal rate, regular rhythm, normal S1 and S2, no murmurs, rubs, clicks, or gallops, Peripheral pulses good, Cap refill <3 sec, no carotid bruits  Abdomen: soft, non-tender, non-distended, normal bowel sounds, no masses or organomegaly  Extremities: no cyanosis, bilateral 2+ lower extremity edema  Musculoskeletal: normal range of motion, no joint swelling, deformity or tenderness  Neurologic: reflexes normal and symmetric, no cranial nerve deficit, gait, coordination and speech normal      LABS:      CBC   Recent Labs     11/04/22 2250   WBC 5.5   HGB 12.2*   HCT 37.3*         RENAL  Recent Labs     11/04/22 2250 11/05/22  0515    142   K 4.1 4.4    105   CO2 27 26   BUN 19 18   CREATININE 1.0 1.0     LFT'S  No results for input(s): AST, ALT, ALB, BILIDIR, BILITOT, ALKPHOS in the last 72 hours. COAG  No results for input(s): INR in the last 72 hours.   CARDIAC ENZYMES  Recent Labs     11/04/22 2250   TROPONINI <0.01       U/A:    Lab Results   Component Value Date/Time    NITRITE negative 07/12/2022 09:55 AM    COLORU Yellow 11/05/2022 01:42 AM    WBCUA None seen 11/05/2022 01:42 AM    RBCUA None seen 11/05/2022 01:42 AM    CLARITYU Clear 11/05/2022 01:42 AM    SPECGRAV 1.025 11/05/2022 01:42 AM    LEUKOCYTESUR Negative 11/05/2022 01:42 AM    BLOODU Negative 11/05/2022 01:42 AM    GLUCOSEU Negative 11/05/2022 01:42 AM       ABG  No results found for: Emmanuelle Jones D7UZPPKE, PHART, THGBART, IOF1WZO, PO2ART, KYI1CIU    UA:  Recent Labs     11/05/22  0142   COLORU Yellow   PHUR 6.0   WBCUA None seen   RBCUA None seen   CLARITYU Clear   SPECGRAV 1.025   LEUKOCYTESUR Negative   UROBILINOGEN 0.2   BILIRUBINUR Negative   BLOODU Negative   GLUCOSEU Negative   KETUA Negative       Microbiology:  No results for input(s): LABGRAM, LABANAE, ORG, CXSURG in the last 72 hours. Nasal Culture: No results for input(s): ORG, MRSAPCR in the last 72 hours. Blood Culture: No results for input(s): BC, BLOODCULT2, ORG in the last 72 hours. Fungal Culture:   No results for input(s): FUNGSM in the last 72 hours. No results for input(s): FUNCXBLD in the last 72 hours. CSF Culture:  No results for input(s): COLORCSF, APPEARCSF, CFTUBE, CLOTCSF, WBCCSF, RBCCSF, NEUTCSF, NUMCELLSCSF, LYMPHSCSF, MONOCSF, GLUCCSF, VOLCSF in the last 72 hours. Respiratory Culture:  No results for input(s): [facility] in the last 72 hours. AFB:No results for input(s): AFBSMEAR in the last 72 hours. Urine Culture  No results for input(s): LABURIN in the last 72 hours. RADIOLOGY:    XR CHEST PORTABLE   Final Result      Small bilateral pleural effusions and bibasilar airspace disease, greater on the left. Consider pneumonia versus atelectasis.                  Previous medical records personally reviewed and analyzed         PHYSICIAN CERTIFICATION    I certify that Suzan Khan is expected to be hospitalized for >2 midnights based on the following assessment and plan:    ASSESSMENT/PLAN:  Active Hospital Problems    Diagnosis Date Noted    Shortness of breath [R06.02] 11/05/2022     Priority: Medium    Acute congestive heart failure ([facility] [address] [I50.9] 11/05/2022     Priority: Medium     Dyspnea likely secondary to new onset CHF  - No prior echo available, echo has been ordered  - Continue Lasix 40 mg IV twice daily  - Strict input and output, fluid restriction  - Cardiology consult  - Telemetry    COVID-19 positive  - Continue to monitor, patient is not hypoxic currently    Coronary artery disease  - Continue aspirin, statin    Hypertension  - Continue home blood pressure medications     BPH  - Continue Flomax    DVT Prophylaxis: lovenox  Diet: ADULT DIET; Regular; 1500 ml  Code Status: Full Code  PT/OT Eval Status: pending    Dispo - pending clinical course       Luz Elena Nevarez MD  The note was completed using EMR. Every effort was made to ensure accuracy; however, inadvertent computerized transcription errors may be present. Thank you Veda Gonzalez MD for the opportunity to be involved in this patient's care. If you have any questions or concerns please feel free to contact me at [phone].

## 2022-11-06 LAB
ANION GAP SERPL CALCULATED.3IONS-SCNC: 9 MMOL/L (ref 3–16)
BASOPHILS ABSOLUTE: 0 K/UL (ref 0–0.2)
BASOPHILS RELATIVE PERCENT: 0.2 %
BUN BLDV-MCNC: 17 MG/DL (ref 7–20)
CALCIUM SERPL-MCNC: 9 MG/DL (ref 8.3–10.6)
CHLORIDE BLD-SCNC: 103 MMOL/L (ref 99–110)
CO2: 28 MMOL/L (ref 21–32)
CREAT SERPL-MCNC: 0.9 MG/DL (ref 0.8–1.3)
EOSINOPHILS ABSOLUTE: 0 K/UL (ref 0–0.6)
EOSINOPHILS RELATIVE PERCENT: 0.9 %
GFR SERPL CREATININE-BSD FRML MDRD: >60 ML/MIN/{1.73_M2}
GLUCOSE BLD-MCNC: 89 MG/DL (ref 70–99)
HCT VFR BLD CALC: 43.4 % (ref 40.5–52.5)
HEMOGLOBIN: 14.2 G/DL (ref 13.5–17.5)
LYMPHOCYTES ABSOLUTE: 0.8 K/UL (ref 1–5.1)
LYMPHOCYTES RELATIVE PERCENT: 21.1 %
MAGNESIUM: 2 MG/DL (ref 1.8–2.4)
MCH RBC QN AUTO: 28.7 PG (ref 26–34)
MCHC RBC AUTO-ENTMCNC: 32.8 G/DL (ref 31–36)
MCV RBC AUTO: 87.6 FL (ref 80–100)
MONOCYTES ABSOLUTE: 0.6 K/UL (ref 0–1.3)
MONOCYTES RELATIVE PERCENT: 14.1 %
NEUTROPHILS ABSOLUTE: 2.5 K/UL (ref 1.7–7.7)
NEUTROPHILS RELATIVE PERCENT: 63.7 %
PDW BLD-RTO: 14.1 % (ref 12.4–15.4)
PLATELET # BLD: 162 K/UL (ref 135–450)
PMV BLD AUTO: 10.2 FL (ref 5–10.5)
POTASSIUM SERPL-SCNC: 3.6 MMOL/L (ref 3.5–5.1)
RBC # BLD: 4.96 M/UL (ref 4.2–5.9)
SODIUM BLD-SCNC: 140 MMOL/L (ref 136–145)
WBC # BLD: 4 K/UL (ref 4–11)

## 2022-11-06 PROCEDURE — 85025 COMPLETE CBC W/AUTO DIFF WBC: CPT

## 2022-11-06 PROCEDURE — 83735 ASSAY OF MAGNESIUM: CPT

## 2022-11-06 PROCEDURE — 99233 SBSQ HOSP IP/OBS HIGH 50: CPT | Performed by: INTERNAL MEDICINE

## 2022-11-06 PROCEDURE — 80048 BASIC METABOLIC PNL TOTAL CA: CPT

## 2022-11-06 PROCEDURE — 36415 COLL VENOUS BLD VENIPUNCTURE: CPT

## 2022-11-06 PROCEDURE — 6370000000 HC RX 637 (ALT 250 FOR IP): Performed by: INTERNAL MEDICINE

## 2022-11-06 PROCEDURE — 2580000003 HC RX 258: Performed by: INTERNAL MEDICINE

## 2022-11-06 PROCEDURE — 6360000002 HC RX W HCPCS: Performed by: INTERNAL MEDICINE

## 2022-11-06 PROCEDURE — 1200000000 HC SEMI PRIVATE

## 2022-11-06 RX ADMIN — ENOXAPARIN SODIUM 40 MG: 100 INJECTION SUBCUTANEOUS at 08:47

## 2022-11-06 RX ADMIN — ATORVASTATIN CALCIUM 40 MG: 40 TABLET, FILM COATED ORAL at 08:47

## 2022-11-06 RX ADMIN — ASPIRIN 81 MG 81 MG: 81 TABLET ORAL at 08:47

## 2022-11-06 RX ADMIN — SODIUM CHLORIDE, PRESERVATIVE FREE 10 ML: 5 INJECTION INTRAVENOUS at 20:08

## 2022-11-06 RX ADMIN — FUROSEMIDE 40 MG: 10 INJECTION, SOLUTION INTRAMUSCULAR; INTRAVENOUS at 17:21

## 2022-11-06 RX ADMIN — METOPROLOL SUCCINATE 25 MG: 25 TABLET, EXTENDED RELEASE ORAL at 08:47

## 2022-11-06 RX ADMIN — OXYBUTYNIN CHLORIDE 5 MG: 5 TABLET, EXTENDED RELEASE ORAL at 08:47

## 2022-11-06 RX ADMIN — TAMSULOSIN HYDROCHLORIDE 0.4 MG: 0.4 CAPSULE ORAL at 08:47

## 2022-11-06 RX ADMIN — FUROSEMIDE 40 MG: 10 INJECTION, SOLUTION INTRAMUSCULAR; INTRAVENOUS at 08:47

## 2022-11-06 RX ADMIN — LISINOPRIL 10 MG: 10 TABLET ORAL at 08:47

## 2022-11-06 RX ADMIN — SODIUM CHLORIDE, PRESERVATIVE FREE 10 ML: 5 INJECTION INTRAVENOUS at 08:47

## 2022-11-06 NOTE — PROGRESS NOTES
Hospitalist Progress Note      PCP: Vanessa Luna MD    Date of Admission: 11/4/2022        Subjective:     Patient feels better. Shortness of breath is improved. No fevers, chills or rigors      Medications:  Reviewed    Infusion Medications    sodium chloride       Scheduled Medications    atorvastatin  40 mg Oral Daily    lisinopril  10 mg Oral Daily    metoprolol succinate  25 mg Oral Daily    oxybutynin  5 mg Oral Daily    tamsulosin  0.4 mg Oral Daily    sodium chloride flush  5-40 mL IntraVENous 2 times per day    enoxaparin  40 mg SubCUTAneous Daily    furosemide  40 mg IntraVENous BID    aspirin  81 mg Oral Daily     PRN Meds: sodium chloride flush, sodium chloride, ondansetron **OR** ondansetron, polyethylene glycol, acetaminophen **OR** acetaminophen, perflutren lipid microspheres      Intake/Output Summary (Last 24 hours) at 11/6/2022 0837  Last data filed at 11/6/2022 0427  Gross per 24 hour   Intake 760 ml   Output 1020 ml   Net -260 ml       Physical Exam Performed:    /75   Pulse 67   Temp 98.1 °F (36.7 °C) (Oral)   Resp 20   Ht 6' 2\" (1.88 m)   Wt 154 lb 1.6 oz (69.9 kg)   SpO2 99%   BMI 19.79 kg/m²     General appearance: No apparent distress, appears stated age and cooperative. HEENT: Pupils equal, round, and reactive to light. Conjunctivae/corneas clear. Neck: Supple, with full range of motion. No jugular venous distention. Trachea midline. Respiratory:  Normal respiratory effort. Clear to auscultation, bilaterally without Rales/Wheezes/Rhonchi. Cardiovascular: Regular rate and rhythm with normal S1/S2 without murmurs, rubs or gallops. Abdomen: Soft, non-tender, non-distended with normal bowel sounds. Musculoskeletal: No clubbing, cyanosis or edema bilaterally. Full range of motion without deformity. Skin: Skin color, texture, turgor normal.  No rashes or lesions. Neurologic:  Neurovascularly intact without any focal sensory/motor deficits.  Cranial nerves: II-XII intact, grossly non-focal.  Psychiatric: Alert and oriented, thought content appropriate, normal insight  Capillary Refill: Brisk, 3 seconds, normal   Peripheral Pulses: +2 palpable, equal bilaterally       Labs:   Recent Labs     11/04/22 2250 11/05/22  0946   WBC 5.5 4.8   HGB 12.2* 12.7*   HCT 37.3* 39.1*    148     Recent Labs     11/04/22 2250 11/05/22  0515    142   K 4.1 4.4    105   CO2 27 26   BUN 19 18   CREATININE 1.0 1.0   CALCIUM 8.6 9.0     No results for input(s): AST, ALT, BILIDIR, BILITOT, ALKPHOS in the last 72 hours. No results for input(s): INR in the last 72 hours. Recent Labs     11/04/22 2250   TROPONINI <0.01       Urinalysis:      Lab Results   Component Value Date/Time    NITRU Negative 11/05/2022 01:42 AM    WBCUA None seen 11/05/2022 01:42 AM    RBCUA None seen 11/05/2022 01:42 AM    BLOODU Negative 11/05/2022 01:42 AM    SPECGRAV 1.025 11/05/2022 01:42 AM    GLUCOSEU Negative 11/05/2022 01:42 AM       Radiology:  XR CHEST PORTABLE   Final Result      Small bilateral pleural effusions and bibasilar airspace disease, greater on the left. Consider pneumonia versus atelectasis. Assessment/Plan:    Dyspnea likely to new onset CHF  2D echo pending, continue IV Lasix, fluid restriction  Cardiology consult appreciated    COVID-19 positive--continue to monitor    Coronary artery disease  - Continue aspirin, statin     Hypertension  - Continue home blood pressure medications     BPH  - Continue Flomax      DVT Prophylaxis Lovenox  Diet: ADULT DIET;  Regular; 1500 ml  Code Status: Full Code      Dispo -likely home in 24 hours if stable      Stephanie Leon MD

## 2022-11-06 NOTE — PLAN OF CARE
Problem: Discharge Planning  Goal: Discharge to home or other facility with appropriate resources  Outcome: Progressing  Flowsheets  Taken 11/6/2022 0944  Discharge to home or other facility with appropriate resources:   Identify barriers to discharge with patient and caregiver   Identify discharge learning needs (meds, wound care, etc)  Taken 11/6/2022 0842  Discharge to home or other facility with appropriate resources: Identify barriers to discharge with patient and caregiver     Problem: Skin/Tissue Integrity  Goal: Absence of new skin breakdown  Description: 1. Monitor for areas of redness and/or skin breakdown  2. Assess vascular access sites hourly  3. Every 4-6 hours minimum:  Change oxygen saturation probe site  4. Every 4-6 hours:  If on nasal continuous positive airway pressure, respiratory therapy assess nares and determine need for appliance change or resting period.   Outcome: Progressing     Problem: ABCDS Injury Assessment  Goal: Absence of physical injury  Outcome: Progressing  Flowsheets (Taken 11/6/2022 0800)  Absence of Physical Injury: Implement safety measures based on patient assessment     Problem: Safety - Adult  Goal: Free from fall injury  Outcome: Progressing  Flowsheets  Taken 11/6/2022 0944  Free From Fall Injury: Instruct family/caregiver on patient safety  Taken 11/6/2022 0800  Free From Fall Injury: Instruct family/caregiver on patient safety

## 2022-11-06 NOTE — PROGRESS NOTES
[address] Daily Progress Note      Admit Date:  11/4/2022    Subjective:  Mr. Dillon Goodman was seen and examined. F/U MS changes/CHF/sob. He feels ok. Denies sob/chest pain. Objective:   /75   Pulse 67   Temp 98.1 °F (36.7 °C) (Oral)   Resp 20   Ht 6' 2\" (1.88 m)   Wt 154 lb 1.6 oz (69.9 kg)   SpO2 99%   BMI 19.79 kg/m²     Intake/Output Summary (Last 24 hours) at 11/6/2022 0814  Last data filed at 11/6/2022 0427  Gross per 24 hour   Intake 760 ml   Output 1020 ml   Net -260 ml       TELEMETRY: Sinus     Physical Exam:  General:  Awake, alert, NAD  Skin:  Warm and dry  Neck:  JVP difficult  Chest:  decreased BS, respiration normal  Cardiovascular:  RRR S1S2  Abdomen:  Soft nontender  Extremities:  tr edema    Medications:    atorvastatin  40 mg Oral Daily    lisinopril  10 mg Oral Daily    metoprolol succinate  25 mg Oral Daily    oxybutynin  5 mg Oral Daily    tamsulosin  0.4 mg Oral Daily    sodium chloride flush  5-40 mL IntraVENous 2 times per day    enoxaparin  40 mg SubCUTAneous Daily    furosemide  40 mg IntraVENous BID    aspirin  81 mg Oral Daily      sodium chloride       sodium chloride flush, sodium chloride, ondansetron **OR** ondansetron, polyethylene glycol, acetaminophen **OR** acetaminophen, perflutren lipid microspheres    Lab Data:  CBC:   Recent Labs     11/04/22 2250 11/05/22  0946   WBC 5.5 4.8   HGB 12.2* 12.7*   HCT 37.3* 39.1*   MCV 87.6 87.6    148     BMP:   Recent Labs     11/04/22 2250 11/05/22  0515    142   K 4.1 4.4    105   CO2 27 26   BUN 19 18   CREATININE 1.0 1.0     LIVER PROFILE: No results for input(s): AST, ALT, LIPASE, BILIDIR, BILITOT, ALKPHOS in the last 72 hours. Invalid input(s): AMYLASE,  ALB  PT/INR: No results for input(s): PROTIME, INR in the last 72 hours. APTT: No results for input(s): APTT in the last 72 hours. BNP:  No results for input(s): BNP in the last 72 hours.   IMAGING:     Assessment:  Patient Active Problem List    Diagnosis Date Noted    Shortness of breath 11/05/2022    Acute congestive heart failure ([facility] [address] 11/05/2022    Tremor of right hand 06/13/2021    Balance disorder 06/13/2021    Impaired memory 06/13/2021    Primary osteoarthritis of left knee 04/21/2016    Contusion of left knee 04/21/2016    Prostate cancer ([facility] [address] 07/01/2015    Elevated PSA 01/18/2013    BPH (benign prostatic hyperplasia) 12/15/2011    Hypertension 09/18/2011    Hyperlipidemia 09/18/2011    CAD (coronary artery disease) 09/18/2011       Plan:  He is much more awake and alert. Knows he is in hospital.  Denies sob. No chest pain. Continue gentle diuresis. Edema is better. On RA. Watch cr. Labs pending.        Core Measures:  Discharge instructions:   LVEF documented:   ACEI for LV dysfunction:   Smoking Cessation:    Yolande Obrien MD, MD 11/6/2022 8:14 AM

## 2022-11-07 PROBLEM — U07.1 COVID-19 VIRUS INFECTION: Status: ACTIVE | Noted: 2022-11-07

## 2022-11-07 LAB
ANION GAP SERPL CALCULATED.3IONS-SCNC: 8 MMOL/L (ref 3–16)
BUN BLDV-MCNC: 22 MG/DL (ref 7–20)
CALCIUM SERPL-MCNC: 8.4 MG/DL (ref 8.3–10.6)
CHLORIDE BLD-SCNC: 104 MMOL/L (ref 99–110)
CO2: 29 MMOL/L (ref 21–32)
CREAT SERPL-MCNC: 0.8 MG/DL (ref 0.8–1.3)
GFR SERPL CREATININE-BSD FRML MDRD: >60 ML/MIN/{1.73_M2}
GLUCOSE BLD-MCNC: 109 MG/DL (ref 70–99)
LV EF: 18 %
LVEF MODALITY: NORMAL
MAGNESIUM: 1.9 MG/DL (ref 1.8–2.4)
POTASSIUM SERPL-SCNC: 3.1 MMOL/L (ref 3.5–5.1)
PRO-BNP: 6989 PG/ML (ref 0–449)
SODIUM BLD-SCNC: 141 MMOL/L (ref 136–145)

## 2022-11-07 PROCEDURE — 97535 SELF CARE MNGMENT TRAINING: CPT

## 2022-11-07 PROCEDURE — 83735 ASSAY OF MAGNESIUM: CPT

## 2022-11-07 PROCEDURE — 6370000000 HC RX 637 (ALT 250 FOR IP): Performed by: HOSPITALIST

## 2022-11-07 PROCEDURE — 6360000002 HC RX W HCPCS: Performed by: INTERNAL MEDICINE

## 2022-11-07 PROCEDURE — 2580000003 HC RX 258: Performed by: INTERNAL MEDICINE

## 2022-11-07 PROCEDURE — 97530 THERAPEUTIC ACTIVITIES: CPT

## 2022-11-07 PROCEDURE — 6370000000 HC RX 637 (ALT 250 FOR IP): Performed by: INTERNAL MEDICINE

## 2022-11-07 PROCEDURE — 36415 COLL VENOUS BLD VENIPUNCTURE: CPT

## 2022-11-07 PROCEDURE — 97162 PT EVAL MOD COMPLEX 30 MIN: CPT

## 2022-11-07 PROCEDURE — 1200000000 HC SEMI PRIVATE

## 2022-11-07 PROCEDURE — 97166 OT EVAL MOD COMPLEX 45 MIN: CPT

## 2022-11-07 PROCEDURE — 80048 BASIC METABOLIC PNL TOTAL CA: CPT

## 2022-11-07 PROCEDURE — 93306 TTE W/DOPPLER COMPLETE: CPT

## 2022-11-07 PROCEDURE — 83880 ASSAY OF NATRIURETIC PEPTIDE: CPT

## 2022-11-07 PROCEDURE — 99233 SBSQ HOSP IP/OBS HIGH 50: CPT | Performed by: NURSE PRACTITIONER

## 2022-11-07 PROCEDURE — 97116 GAIT TRAINING THERAPY: CPT

## 2022-11-07 RX ORDER — POTASSIUM CHLORIDE 20 MEQ/1
40 TABLET, EXTENDED RELEASE ORAL PRN
Status: DISCONTINUED | OUTPATIENT
Start: 2022-11-07 | End: 2022-11-11 | Stop reason: HOSPADM

## 2022-11-07 RX ORDER — POTASSIUM CHLORIDE 7.45 MG/ML
10 INJECTION INTRAVENOUS PRN
Status: DISCONTINUED | OUTPATIENT
Start: 2022-11-07 | End: 2022-11-11 | Stop reason: HOSPADM

## 2022-11-07 RX ORDER — MAGNESIUM SULFATE IN WATER 40 MG/ML
2000 INJECTION, SOLUTION INTRAVENOUS PRN
Status: DISCONTINUED | OUTPATIENT
Start: 2022-11-07 | End: 2022-11-11 | Stop reason: HOSPADM

## 2022-11-07 RX ORDER — POTASSIUM CHLORIDE 20 MEQ/1
40 TABLET, EXTENDED RELEASE ORAL ONCE
Status: COMPLETED | OUTPATIENT
Start: 2022-11-07 | End: 2022-11-07

## 2022-11-07 RX ORDER — POTASSIUM CHLORIDE 20 MEQ/1
20 TABLET, EXTENDED RELEASE ORAL
Status: DISCONTINUED | OUTPATIENT
Start: 2022-11-08 | End: 2022-11-11 | Stop reason: HOSPADM

## 2022-11-07 RX ADMIN — ENOXAPARIN SODIUM 40 MG: 100 INJECTION SUBCUTANEOUS at 08:00

## 2022-11-07 RX ADMIN — ATORVASTATIN CALCIUM 40 MG: 40 TABLET, FILM COATED ORAL at 08:00

## 2022-11-07 RX ADMIN — FUROSEMIDE 40 MG: 10 INJECTION, SOLUTION INTRAMUSCULAR; INTRAVENOUS at 08:00

## 2022-11-07 RX ADMIN — TAMSULOSIN HYDROCHLORIDE 0.4 MG: 0.4 CAPSULE ORAL at 08:00

## 2022-11-07 RX ADMIN — SODIUM CHLORIDE, PRESERVATIVE FREE 5 ML: 5 INJECTION INTRAVENOUS at 21:00

## 2022-11-07 RX ADMIN — OXYBUTYNIN CHLORIDE 5 MG: 5 TABLET, EXTENDED RELEASE ORAL at 08:00

## 2022-11-07 RX ADMIN — POTASSIUM CHLORIDE 40 MEQ: 1500 TABLET, EXTENDED RELEASE ORAL at 08:14

## 2022-11-07 RX ADMIN — METOPROLOL SUCCINATE 25 MG: 25 TABLET, EXTENDED RELEASE ORAL at 08:00

## 2022-11-07 RX ADMIN — SODIUM CHLORIDE, PRESERVATIVE FREE 10 ML: 5 INJECTION INTRAVENOUS at 08:01

## 2022-11-07 RX ADMIN — FUROSEMIDE 40 MG: 10 INJECTION, SOLUTION INTRAMUSCULAR; INTRAVENOUS at 17:27

## 2022-11-07 RX ADMIN — LISINOPRIL 10 MG: 10 TABLET ORAL at 08:00

## 2022-11-07 RX ADMIN — POTASSIUM CHLORIDE 40 MEQ: 1500 TABLET, EXTENDED RELEASE ORAL at 06:00

## 2022-11-07 RX ADMIN — ASPIRIN 81 MG 81 MG: 81 TABLET ORAL at 08:00

## 2022-11-07 ASSESSMENT — PAIN SCALES - GENERAL: PAINLEVEL_OUTOF10: 0

## 2022-11-07 NOTE — PROGRESS NOTES
Occupational Therapy  Facility/Department: [place] [address] [facility]  Occupational Therapy Initial Assessment/Treatment    Name: Leigh Narvaez  : 1935  MRN: [id number]  Date of Service: 2022    Discharge Recommendations:   Leigh Narvaez scored a 20/24 on the AM-PAC ADL Inpatient form. Current research shows that an AM-PAC score of 18 or greater is typically associated with a discharge to the patient's home setting. Based on the patient's AM-PAC score, and their current ADL deficits, it is recommended that the patient have 2-3 sessions per week of Occupational Therapy at d/c to increase the patient's independence. At this time, this patient demonstrates the endurance and safety to discharge  home with home services and a follow up treatment frequency of 2-3x/wk. Please see assessment section for further patient specific details. If patient discharges prior to next session this note will serve as a discharge summary. Please see below for the latest assessment towards goals. OT Equipment Recommendations  Equipment Needed: No       Patient Diagnosis(es): The primary encounter diagnosis was COVID-19 virus infection. A diagnosis of Bilateral pleural effusion was also pertinent to this visit. Past Medical History:  has a past medical history of Arthritis, Balance disorder, CAD (coronary artery disease), Chest pain, Dental disease, Dizziness, Dyslipidemia, Fatigue, Hard of hearing, History of prostate cancer, Hypertension, Hyperthyroidism, Impaired memory, SOB (shortness of breath), and Vitamin D deficiency. Past Surgical History:  has a past surgical history that includes Cardiac surgery. Treatment Diagnosis: Impaired ADL and functional mobility      Assessment   Performance deficits / Impairments: Decreased functional mobility ; Decreased ADL status; Decreased endurance;Decreased balance  Assessment: Pt presents with a decline in functional independence. Pt is from home with wife.   Pt reports that his wife had a stroke and family assist.  Currently, pt is requiring CG-min assist for mobility and CG-SBA for self-care. Pt is at risk for falls and should have 24 hr assist for home discharge. Feel pt will benefit from OT services. Treatment Diagnosis: Impaired ADL and functional mobility  Prognosis: Good  Decision Making: Medium Complexity  REQUIRES OT FOLLOW-UP: Yes  Activity Tolerance  Activity Tolerance: Patient Tolerated treatment well        Plan   Occupational Therapy Plan  Times Per Week: 2-5  Current Treatment Recommendations: Strengthening, Balance training, Functional mobility training, Endurance training, Self-Care / ADL, Cognitive reorientation     Restrictions  Position Activity Restriction  Other position/activity restrictions: Up as toplerated    Subjective   General  Chart Reviewed: Yes  Additional Pertinent Hx: Pt admitted 11/4/22 with dizziness and shortness of breath. Family / Caregiver Present: No  Referring Practitioner: Dr. Devi Loss  Diagnosis: Shortness of breath  Subjective  Subjective: Pt in bed upon entry. Pt agreeable to activity. No c/o pain. Social/Functional History  Social/Functional History  Lives With: Spouse  Type of Home: House  Home Layout: Multi-level, Able to Live on Main level with bedroom/bathroom  Home Access:  (2 GEETHA)  Bathroom Shower/Tub: Tub/Shower unit  Bathroom Toilet: Handicap height  Home Equipment: Cane  Has the patient had two or more falls in the past year or any fall with injury in the past year?:  (Reports 2 falls in the last year)  ADL Assistance: Independent  Ambulation Assistance: Independent (reports was using cane recently but was thinking of starting to use walker)  Transfer Assistance: Independent  Type of Occupation: worked for [employer]  Additional Comments: reports wife had a stroke - daughter and son assist them. Above info per pt report - question reliability.   History mildly difficult to obtain 2/2 [facility]       Objective Safety Devices  Type of Devices: Call light within reach; Chair alarm in place;Nurse notified; Left in chair     Toilet Transfers  Toilet - Technique: Ambulating  Equipment Used: Standard toilet (grab bar)  Toilet Transfer: Contact guard assistance  Toilet Transfers Comments: Pt walked to/from bathroom with CG  AROM: Within functional limits  Strength: Within functional limits  Coordination: Within functional limits  ADL  Grooming: Contact guard assistance (to SBA to wash hands and face, standing at sink)  LE Dressing: Stand by assistance (socks)  Toileting:  (Denied need)           Transfers  Stand Step Transfers: Contact guard assistance  Sit to stand: Contact guard assistance  Stand to sit: Contact guard assistance  Vision  Vision: Within Functional Limits  Hearing  Hearing: Exceptions to [facility]  Hearing Exceptions: Hard of hearing/hearing concerns  Cognition  Overall Cognitive Status: Exceptions  Arousal/Alertness: Appropriate responses to stimuli  Following Commands: Follows one step commands consistently  Memory: Decreased short term memory  Insights: Decreased awareness of deficits  Orientation  Orientation Level: Oriented to person;Disoriented to time;Oriented to place (knew year but thought it was February)                  Education Given To: Patient  Education Provided: Role of Therapy;Plan of Care;Transfer Training  Education Method: Verbal  Barriers to Learning: Cognition  Education Outcome: Continued education needed;Verbalized understanding               Treatment included ADL and transfer training.        AM-PAC Score        AM-[facility] Inpatient Daily Activity Raw Score: 20 (11/07/22 1243)  AM-PAC Inpatient ADL T-Scale Score : 42.03 (11/07/22 1243)  ADL Inpatient CMS 0-100% Score: 38.32 (11/07/22 1243)  ADL Inpatient CMS G-Code Modifier : CJ (11/07/22 1243)    Goals                       No goals met  Short Term Goals  Time Frame for Short Term Goals: Discharge  Short Term Goal 1: Transfer to/from toilet with SBA  Short Term Goal 2: Stance with SBA x6 min while engaging in ADL/functional mobility  Short Term Goal 3: Don pants with SBA  Patient Goals   Patient goals : Go home       Therapy Time   Individual Concurrent Group Co-treatment   Time In 3675         Time Out 1213         Minutes 44         Timed Code Treatment Minutes: 34 Minutes   Total Treatment Time:44    Joan Calabrese, OTR/L [id number]

## 2022-11-07 NOTE — ACP (ADVANCE CARE PLANNING)
Patient does not have capacity at this time. His wife, who is his next of kin, is acting as decision maker. I was not able to reach her to see if there are AD's that would indicate someone else or a secondary decision maker.

## 2022-11-07 NOTE — PROGRESS NOTES
[facility]   Cardiology  Note   Dr Tete Simms MD, Rupa Jean RN, FNP APRN CVNP  Date: 11/7/2022  Admit Date: 11/4/2022       CC/cardiology consult: Dizziness, MS changes    Interval Hx /  Subjective: Today, he is resting in bed, VSS   No new complaints today. No major events overnight. Denies having chest pain, palpitations, shortness of breath, orthopnea/PND, cough, or dizziness at the time of this visit. In Covid isolation      Patient seen and examined. Clinical notes reviewed.  Telemetry reviewed / Pertinent labs, diagnostic, device, and imaging results reviewed as a part of this visit  I spent a total of 35 minutes and greater than 50% of the time was spent counseling with patient  coordinating care regarding her diagnosis, treatments and plan of care    Past Medical History:  Past Medical History:   Diagnosis Date    Arthritis     Balance disorder     CAD (coronary artery disease)     Chest pain     Dental disease     Dizziness     Dyslipidemia     Fatigue     Hard of hearing     History of prostate cancer     Hypertension     Hyperthyroidism     Impaired memory     SOB (shortness of breath)     Vitamin D deficiency       Scheduled Meds:   [START ON 11/8/2022] potassium chloride  20 mEq Oral Daily with breakfast    atorvastatin  40 mg Oral Daily    lisinopril  10 mg Oral Daily    metoprolol succinate  25 mg Oral Daily    oxybutynin  5 mg Oral Daily    tamsulosin  0.4 mg Oral Daily    sodium chloride flush  5-40 mL IntraVENous 2 times per day    enoxaparin  40 mg SubCUTAneous Daily    furosemide  40 mg IntraVENous BID    aspirin  81 mg Oral Daily     Vitals:    11/07/22 1442   BP: 122/78   Pulse: 77   Resp: 16   Temp: 97.3 °F (36.3 °C)   SpO2: 100%      In: 1175 [P.O.:1175]  Out: 3175    Wt Readings from Last 3 Encounters:   11/07/22 141 lb 8.6 oz (64.2 kg)   10/26/22 154 lb 6.4 oz (70 kg)   10/05/22 156 lb 12.8 oz (71.1 kg)       Intake/Output Summary (Last 24 hours) at 11/7/2022 156 [facility] filed at 11/7/2022 1042  Gross per 24 hour   Intake 575 ml   Output 2475 ml   Net -1900 ml       Telemetry: Personally Reviewed    Constitutional: Cooperative and in no apparent distress, and appears well nourished  Skin: Warm and pink; no pallor, cyanosis, clubbing, or bruising   HEENT: Symmetric and normocephalic. Cardiovascular: Regular rate and rhythm. S1/S2 present without murmurs, no rubs or gallops. Peripheral pulses 2+, capillary refill < 3 seconds. No elevation of JVP. No peripheral edema  Respiratory: Respirations symmetric and unlabored. Lungs clear to auscultation bilaterally, no wheezing, crackles, or rhonchi  Gastrointestinal: Abdomen soft and round. Bowel sounds normoactive without tenderness or masses. Musculoskeletal: Bilateral upper and lower extremity strength 5/5 with full ROM  Neurologic/Psych: Awake and orientated to person, place and time.  Calm affect, appropriate mood    Patient Active Problem List    Diagnosis Date Noted    Shortness of breath 11/05/2022    Acute congestive heart failure ([facility] [address] 11/05/2022    Tremor of right hand 06/13/2021    Balance disorder 06/13/2021    Impaired memory 06/13/2021    Primary osteoarthritis of left knee 04/21/2016    Contusion of left knee 04/21/2016    Prostate cancer ([facility] [address] 07/01/2015    Elevated PSA 01/18/2013    BPH (benign prostatic hyperplasia) 12/15/2011    Hypertension 09/18/2011    Hyperlipidemia 09/18/2011    CAD (coronary artery disease) 09/18/2011        Assessment     new onset CHF  TTE pending   continue IV Lasix  fluid restriction low salt diet /sCr wnl     Net -750ml      COVID-19 positive  Managed per IM     CAD    Continue aspirin, statin     HTN   Controlled      BPH  Continue Flomax     Hypokalemia  Replace as needed     Plan   Continue card meds asa / lipitor  / lasix  /  lisinopril  / Toprol  Klor con     TTE pending   Will follow    Thank you for allowing to us to participate in the care of Pia Akers [facility]-CNP-CVNP    [address]

## 2022-11-07 NOTE — PROGRESS NOTES
Hospitalist Progress Note      PCP: Latia Solorzano MD    Date of Admission: 11/4/2022        Subjective:     Patient feels well today and has no chest pain or shortness of breath. Medications:  Reviewed    Infusion Medications    sodium chloride       Scheduled Medications    [START ON 11/8/2022] potassium chloride  20 mEq Oral Daily with breakfast    atorvastatin  40 mg Oral Daily    lisinopril  10 mg Oral Daily    metoprolol succinate  25 mg Oral Daily    oxybutynin  5 mg Oral Daily    tamsulosin  0.4 mg Oral Daily    sodium chloride flush  5-40 mL IntraVENous 2 times per day    enoxaparin  40 mg SubCUTAneous Daily    furosemide  40 mg IntraVENous BID    aspirin  81 mg Oral Daily     PRN Meds: magnesium sulfate, potassium chloride **OR** potassium alternative oral replacement **OR** potassium chloride, sodium chloride flush, sodium chloride, ondansetron **OR** ondansetron, polyethylene glycol, acetaminophen **OR** acetaminophen, perflutren lipid microspheres      Intake/Output Summary (Last 24 hours) at 11/7/2022 1202  Last data filed at 11/7/2022 1042  Gross per 24 hour   Intake 775 ml   Output 2375 ml   Net -1600 ml         Physical Exam Performed:    BP (!) 150/90   Pulse 83   Temp 97.7 °F (36.5 °C) (Oral)   Resp 18   Ht 6' 2\" (1.88 m)   Wt 154 lb 1.6 oz (69.9 kg)   SpO2 100%   BMI 19.79 kg/m²     General appearance: No apparent distress, appears stated age and cooperative. HEENT: Pupils equal, round, and reactive to light. Conjunctivae/corneas clear. Neck: Supple, with full range of motion. No jugular venous distention. Trachea midline. Respiratory:  Normal respiratory effort. Clear to auscultation, bilaterally without Rales/Wheezes/Rhonchi. Cardiovascular: Regular rate and rhythm with normal S1/S2 without murmurs, rubs or gallops. Abdomen: Soft, non-tender, non-distended with normal bowel sounds. Musculoskeletal: No clubbing, cyanosis or edema bilaterally.   Full range of motion without deformity. Skin: Skin color, texture, turgor normal.  No rashes or lesions. Neurologic:  Neurovascularly intact without any focal sensory/motor deficits. Cranial nerves: II-XII intact, grossly non-focal.  Psychiatric: Alert and oriented, thought content appropriate, normal insight  Capillary Refill: Brisk, 3 seconds, normal   Peripheral Pulses: +2 palpable, equal bilaterally       Labs:   Recent Labs     11/04/22  2250 11/05/22  0946 11/06/22  1005   WBC 5.5 4.8 4.0   HGB 12.2* 12.7* 14.2   HCT 37.3* 39.1* 43.4    148 162       Recent Labs     11/05/22  0515 11/06/22  1005 11/07/22  0412    140 141   K 4.4 3.6 3.1*    103 104   CO2 26 28 29   BUN 18 17 22*   CREATININE 1.0 0.9 0.8   CALCIUM 9.0 9.0 8.4       No results for input(s): AST, ALT, BILIDIR, BILITOT, ALKPHOS in the last 72 hours. No results for input(s): INR in the last 72 hours. Recent Labs     11/04/22  2250   TROPONINI <0.01         Urinalysis:      Lab Results   Component Value Date/Time    NITRU Negative 11/05/2022 01:42 AM    WBCUA None seen 11/05/2022 01:42 AM    RBCUA None seen 11/05/2022 01:42 AM    BLOODU Negative 11/05/2022 01:42 AM    SPECGRAV 1.025 11/05/2022 01:42 AM    GLUCOSEU Negative 11/05/2022 01:42 AM       Radiology:  XR CHEST PORTABLE   Final Result      Small bilateral pleural effusions and bibasilar airspace disease, greater on the left. Consider pneumonia versus atelectasis. Assessment/Plan:     new onset CHF, unspecified type  2D echo pending, continue IV Lasix, fluid restriction  Cardiology consult appreciated    COVID-19 positive--continue to monitor    Coronary artery disease  - Continue aspirin, statin     Hypertension  - Continue home blood pressure medications     BPH  - Continue Flomax    Hypokalemia  -replete      DVT Prophylaxis Lovenox  Diet: ADULT DIET;  Regular; Low Fat/Low Chol/High Fiber/2 gm Na; 1500 ml  Code Status: Full Code      Dispo -clinically stable but awaiting echo, PT and OT eduarda's      Yadira Loaiza MD

## 2022-11-07 NOTE — PROGRESS NOTES
Hospitalist Progress Note      PCP: Home Poole MD    Date of Admission: 11/4/2022        Subjective:     Patient feels well today and has no chest pain or shortness of breath. Medications:  Reviewed    Infusion Medications    sodium chloride       Scheduled Medications    [START ON 11/8/2022] potassium chloride  20 mEq Oral Daily with breakfast    atorvastatin  40 mg Oral Daily    lisinopril  10 mg Oral Daily    metoprolol succinate  25 mg Oral Daily    oxybutynin  5 mg Oral Daily    tamsulosin  0.4 mg Oral Daily    sodium chloride flush  5-40 mL IntraVENous 2 times per day    enoxaparin  40 mg SubCUTAneous Daily    furosemide  40 mg IntraVENous BID    aspirin  81 mg Oral Daily     PRN Meds: magnesium sulfate, potassium chloride **OR** potassium alternative oral replacement **OR** potassium chloride, sodium chloride flush, sodium chloride, ondansetron **OR** ondansetron, polyethylene glycol, acetaminophen **OR** acetaminophen, perflutren lipid microspheres      Intake/Output Summary (Last 24 hours) at 11/7/2022 1323  Last data filed at 11/7/2022 1042  Gross per 24 hour   Intake 775 ml   Output 2375 ml   Net -1600 ml         Physical Exam Performed:    /61   Pulse 69   Temp 97.4 °F (36.3 °C) (Oral)   Resp 18   Ht 6' 2\" (1.88 m)   Wt 154 lb 1.6 oz (69.9 kg)   SpO2 98%   BMI 19.79 kg/m²     General appearance: No apparent distress, appears stated age and cooperative. HEENT: Pupils equal, round, and reactive to light. Conjunctivae/corneas clear. Neck: Supple, with full range of motion. No jugular venous distention. Trachea midline. Respiratory:  Normal respiratory effort. Clear to auscultation, bilaterally without Rales/Wheezes/Rhonchi. Cardiovascular: Regular rate and rhythm with normal S1/S2 without murmurs, rubs or gallops. Abdomen: Soft, non-tender, non-distended with normal bowel sounds. Musculoskeletal: No clubbing, cyanosis or edema bilaterally.   Full range of motion without deformity. Skin: Skin color, texture, turgor normal.  No rashes or lesions. Neurologic:  Neurovascularly intact without any focal sensory/motor deficits. Cranial nerves: II-XII intact, grossly non-focal.  Psychiatric: Alert and oriented, thought content appropriate, normal insight  Capillary Refill: Brisk, 3 seconds, normal   Peripheral Pulses: +2 palpable, equal bilaterally       Labs:   Recent Labs     11/04/22  2250 11/05/22  0946 11/06/22  1005   WBC 5.5 4.8 4.0   HGB 12.2* 12.7* 14.2   HCT 37.3* 39.1* 43.4    148 162       Recent Labs     11/05/22  0515 11/06/22  1005 11/07/22  0412    140 141   K 4.4 3.6 3.1*    103 104   CO2 26 28 29   BUN 18 17 22*   CREATININE 1.0 0.9 0.8   CALCIUM 9.0 9.0 8.4       No results for input(s): AST, ALT, BILIDIR, BILITOT, ALKPHOS in the last 72 hours. No results for input(s): INR in the last 72 hours. Recent Labs     11/04/22  2250   TROPONINI <0.01         Urinalysis:      Lab Results   Component Value Date/Time    NITRU Negative 11/05/2022 01:42 AM    WBCUA None seen 11/05/2022 01:42 AM    RBCUA None seen 11/05/2022 01:42 AM    BLOODU Negative 11/05/2022 01:42 AM    SPECGRAV 1.025 11/05/2022 01:42 AM    GLUCOSEU Negative 11/05/2022 01:42 AM       Radiology:  XR CHEST PORTABLE   Final Result      Small bilateral pleural effusions and bibasilar airspace disease, greater on the left. Consider pneumonia versus atelectasis. Assessment/Plan:     new onset CHF,unspecified type  2D echo pending, continue IV Lasix, fluid restriction  Cardiology consult appreciated    COVID-19 positive--continue to monitor    Coronary artery disease  - Continue aspirin, statin     Hypertension  - Continue home blood pressure medications     BPH  - Continue Flomax      DVT Prophylaxis Lovenox  Diet: ADULT DIET;  Regular; Low Fat/Low Chol/High Fiber/2 gm Na; 1500 ml  Code Status: Full Code      Dispo -clinically stable but awaiting echo, PT and OT matt Pathak MD

## 2022-11-07 NOTE — PROGRESS NOTES
11/07/22 1548   Encounter Summary   Encounter Overview/Reason  Attempted Encounter   Last Encounter    (es 11/7 attempted acp see note)   Complexity of Encounter Moderate   Begin Time 1525   End Time  1535   Total Time Calculated 10 min   Advance Care Planning   Type   (see acp note)   Plan and Referrals   Plan/Referrals No future visits requested

## 2022-11-07 NOTE — PROGRESS NOTES
Physical Therapy  Facility/Department: [place] [address] [facility]  Physical Therapy Initial Assessment and Treatment    Name: Panda Sanchez  : 1935  MRN: [id number]  Date of Service: 2022    Discharge Recommendations:    Panda Sanchez scored a 18/24 on the AM-PAC short mobility form. Current research shows that an AM-PAC score of 18 or greater is typically associated with a discharge to the patient's home setting. Based on the patient's AM-PAC score and their current functional mobility deficits, it is recommended that the patient have 2-3 sessions per week of Physical Therapy at d/c to increase the patient's independence. At this time, this patient demonstrates the endurance and safety to discharge home with home vs OP PT and a follow up treatment frequency of 2-3x/wk. Please see assessment section for further patient specific details. If patient discharges prior to next session this note will serve as a discharge summary. Please see below for the latest assessment towards goals. PT Equipment Recommendations  Equipment Needed:  (rolling walker if goes home and doesn't have)      Patient Diagnosis(es): The primary encounter diagnosis was COVID-19 virus infection. A diagnosis of Bilateral pleural effusion was also pertinent to this visit. Past Medical History:  has a past medical history of Arthritis, Balance disorder, CAD (coronary artery disease), Chest pain, Dental disease, Dizziness, Dyslipidemia, Fatigue, Hard of hearing, History of prostate cancer, Hypertension, Hyperthyroidism, Impaired memory, SOB (shortness of breath), and Vitamin D deficiency. Past Surgical History:  has a past surgical history that includes Cardiac surgery. Assessment   Body Structures, Functions, Activity Limitations Requiring Skilled Therapeutic Intervention: Decreased functional mobility ; Decreased safe awareness;Decreased cognition;Decreased balance;Decreased endurance  Assessment: Unclear of pt's baseline 2/2 pt possible poor historian. Pt reports normally independent using cane recently however reports he has been thinking of getting a walker. Currently needing CG/min assist with transfers and gt with and without a walker. Pt at risk for falls and not safe to get up alone. If pt returns home, would need 24 hr direct assist.  Rec cont skilled PT to maximize mobility and independence  Treatment Diagnosis: impaired functional mobility 2/2 decreased balance  Decision Making: Medium Complexity  Requires PT Follow-Up: Yes     Plan   Physcial Therapy Plan  General Plan:  (2-5)  Current Treatment Recommendations: Strengthening, Balance training, Functional mobility training, Gait training, Stair training, Patient/Caregiver education & training, Safety education & training, Endurance training  Safety Devices  Type of Devices: Call light within reach, Chair alarm in place, Nurse notified, Left in chair     Restrictions  Position Activity Restriction  Other position/activity restrictions: Up as toplerated     Subjective   General  Chart Reviewed: Yes  Additional Pertinent Hx: Pt is an 82 y.o. male adm 11/4 with SOB. Pt presented to the ED with his son states over the last few days he has not been himself. He has been less active and occasionally \"talking out of his head\". CXR:Small bilateral pleural effusions and bibasilar airspace disease, greater on the left. Consider pneumonia versus atelectasis. PMH:hypertension, dyslipidemia, coronary artery disease post remote CABG and dementia  Referring Practitioner: Deric Hansen MD  Referral Date : 11/05/22  Subjective  Subjective: Pt found supine. Agreeable to PT. Denies pain.          Social/Functional History  Social/Functional History  Lives With: Spouse  Type of Home: House  Home Layout: Multi-level, Able to Live on Main level with bedroom/bathroom  Home Access:  (2 GEETHA)  Bathroom Shower/Tub: Tub/Shower unit  Bathroom Toilet: Handicap height  Home Equipment: Cane  Has the patient had two or more falls in the past year or any fall with injury in the past year?:  (Reports 2 falls in the last year)  ADL Assistance: Independent  Ambulation Assistance: Independent (reports was using cane recently but was thinking of starting to use walker)  Transfer Assistance: Independent  Type of Occupation: worked for [employer]  Additional Comments: reports wife had a stroke - daughter and son assist them. Above info per pt report - question reliability. History mildly difficult to obtain 2/2 [place]  Vision/Hearing  Vision  Vision: Within Functional Limits  Hearing  Hearing: Exceptions to [facility]  Hearing Exceptions: Hard of hearing/hearing concerns    Cognition   Orientation  Orientation Level: Oriented to person;Disoriented to time;Oriented to place (knew year but thought it was February)     Objective                 AROM RLE (degrees)  RLE AROM: WFL  AROM LLE (degrees)  LLE AROM : WFL  Strength RLE  Strength RLE: WFL  Strength LLE  Strength LLE: WFL           Bed mobility  Supine to Sit: Stand by assistance (HOB elevated with rail)  Transfers  Sit to Stand: Contact guard assistance from bed, min assist from chair. Cues for hand placement  Stand to Sit: Contact guard assistance, cues for safety and hand placement  Ambulation  Device: No Device  Assistance: Minimal assistance  Quality of Gait: unsteady, occas reaches out for UE support, decreased bilat step length/height  Distance: 3', 10' x 2  Comments: Amb 24' with rolling walker with CG to min assist, needed min assist when during around 2/2 LOB.   Decreased bilat step length/height      Treatment included: bed mobility, transfers, gt, pt education             OutComes Score                                                  AM-PAC Score  AM-PAC Inpatient Mobility Raw Score : 18 (11/07/22 1222)  AM-PAC Inpatient T-Scale Score : 43.63 (11/07/22 1222)  Mobility Inpatient CMS 0-100% Score: 46.58 (11/07/22 1222)  Mobility Inpatient CMS G-Code Modifier : CK (11/07/22 1222)          Tinneti Score       Goals  Short Term Goals  Time Frame for Short Term Goals: By discharge  Short Term Goal 1: Sup to sit supervision with bed flat and without rail  Short Term Goal 2: Pt will transfer sit to stand supervision  Short Term Goal 3: Pt will amb >100' with LRAD supervision  Short Term Goal 4: Pt will up/down 2 steps with LRAD SBA       Education  Patient Education  Education Given To: Patient  Education Provided: Role of Therapy;Plan of Care  Education Method: Verbal  Education Outcome: Verbalized understanding;Continued education needed      Therapy Time   Individual Concurrent Group Co-treatment   Time In 1129         Time Out 1214         Minutes 45             Timed Code Treatment Minutes: 30      Total Treatment Minutes:  175 Abdirashid Montoya PT

## 2022-11-07 NOTE — PLAN OF CARE
Problem: Safety - Adult  Goal: Free from fall injury  Outcome: Progressing  Bed alarm active, call light within reach, hourly rounding, room clear of clutter, bed locked and in lowest position.

## 2022-11-08 LAB
ANION GAP SERPL CALCULATED.3IONS-SCNC: 9 MMOL/L (ref 3–16)
BUN BLDV-MCNC: 21 MG/DL (ref 7–20)
CALCIUM SERPL-MCNC: 8.7 MG/DL (ref 8.3–10.6)
CHLORIDE BLD-SCNC: 102 MMOL/L (ref 99–110)
CO2: 31 MMOL/L (ref 21–32)
CREAT SERPL-MCNC: 0.8 MG/DL (ref 0.8–1.3)
GFR SERPL CREATININE-BSD FRML MDRD: >60 ML/MIN/{1.73_M2}
GLUCOSE BLD-MCNC: 88 MG/DL (ref 70–99)
MAGNESIUM: 1.8 MG/DL (ref 1.8–2.4)
POTASSIUM SERPL-SCNC: 3.8 MMOL/L (ref 3.5–5.1)
SODIUM BLD-SCNC: 142 MMOL/L (ref 136–145)

## 2022-11-08 PROCEDURE — 99233 SBSQ HOSP IP/OBS HIGH 50: CPT | Performed by: NURSE PRACTITIONER

## 2022-11-08 PROCEDURE — 1200000000 HC SEMI PRIVATE

## 2022-11-08 PROCEDURE — 6370000000 HC RX 637 (ALT 250 FOR IP): Performed by: NURSE PRACTITIONER

## 2022-11-08 PROCEDURE — 80048 BASIC METABOLIC PNL TOTAL CA: CPT

## 2022-11-08 PROCEDURE — 83735 ASSAY OF MAGNESIUM: CPT

## 2022-11-08 PROCEDURE — 36415 COLL VENOUS BLD VENIPUNCTURE: CPT

## 2022-11-08 PROCEDURE — 2580000003 HC RX 258: Performed by: INTERNAL MEDICINE

## 2022-11-08 PROCEDURE — 6360000002 HC RX W HCPCS: Performed by: INTERNAL MEDICINE

## 2022-11-08 PROCEDURE — 6370000000 HC RX 637 (ALT 250 FOR IP): Performed by: INTERNAL MEDICINE

## 2022-11-08 RX ORDER — METOPROLOL SUCCINATE 25 MG/1
25 TABLET, EXTENDED RELEASE ORAL 2 TIMES DAILY
Status: DISCONTINUED | OUTPATIENT
Start: 2022-11-08 | End: 2022-11-11 | Stop reason: HOSPADM

## 2022-11-08 RX ADMIN — POTASSIUM CHLORIDE 20 MEQ: 1500 TABLET, EXTENDED RELEASE ORAL at 09:13

## 2022-11-08 RX ADMIN — TAMSULOSIN HYDROCHLORIDE 0.4 MG: 0.4 CAPSULE ORAL at 09:14

## 2022-11-08 RX ADMIN — METOPROLOL SUCCINATE 25 MG: 25 TABLET, EXTENDED RELEASE ORAL at 09:14

## 2022-11-08 RX ADMIN — FUROSEMIDE 40 MG: 10 INJECTION, SOLUTION INTRAMUSCULAR; INTRAVENOUS at 18:33

## 2022-11-08 RX ADMIN — FUROSEMIDE 40 MG: 10 INJECTION, SOLUTION INTRAMUSCULAR; INTRAVENOUS at 09:13

## 2022-11-08 RX ADMIN — OXYBUTYNIN CHLORIDE 5 MG: 5 TABLET, EXTENDED RELEASE ORAL at 09:14

## 2022-11-08 RX ADMIN — SODIUM CHLORIDE, PRESERVATIVE FREE 5 ML: 5 INJECTION INTRAVENOUS at 20:51

## 2022-11-08 RX ADMIN — SODIUM CHLORIDE, PRESERVATIVE FREE 10 ML: 5 INJECTION INTRAVENOUS at 09:15

## 2022-11-08 RX ADMIN — ENOXAPARIN SODIUM 40 MG: 100 INJECTION SUBCUTANEOUS at 09:15

## 2022-11-08 RX ADMIN — ASPIRIN 81 MG 81 MG: 81 TABLET ORAL at 09:09

## 2022-11-08 RX ADMIN — METOPROLOL SUCCINATE 25 MG: 25 TABLET, FILM COATED, EXTENDED RELEASE ORAL at 20:51

## 2022-11-08 RX ADMIN — ATORVASTATIN CALCIUM 40 MG: 40 TABLET, FILM COATED ORAL at 09:14

## 2022-11-08 RX ADMIN — LISINOPRIL 10 MG: 10 TABLET ORAL at 09:07

## 2022-11-08 NOTE — CARE COORDINATION
CM following for discharge planning. CM attempted to call wife, number not in service and chart review states she has had a stroke and family is helping care for her and the patient when not in hospital.   CM placed call to son but no answer, left message for call back to discuss discharge planning and patient's functional abilities prior to hospitalization. CM placed call to pt's daughter, no answer and unable to leave a vm as mailbox is full. Medical House Calls following. Addendum: Pt's daughter Ernestien Blackmon returned phone call. Ernestine Blackmon moved in with pt and his wife after his wife had a stroke. Ernestine Blackmon reports that the patient was Independent prior to admission, mainly walked on his own and occasionally with a cane. Pt does not have a walker but PT reccommended one at discharge. Pt's daughter in agreement with [facility] and stated she would like the same one as her mother has and would have to get back with CM on the name of the agency. Medical House Calls following. Pt has several stairs to enter the home, Ernestine Blackmon states they are working on getting railings put in. Ernestine Blackmon will transport patient at discharge.     Tuyet Carmen RN, BSN, [address]  Case Management Department  [phone]

## 2022-11-08 NOTE — DISCHARGE INSTR - COC
Continuity of Care Form    Patient Name: Melinda Gregg   :  1935  MRN:  [id number]    Admit date:  2022  Discharge date:  ***    Code Status Order: Full Code   Advance Directives:     Admitting Physician:  Deric Hansen MD  PCP: Samir Garza MD    Discharging Nurse: [facility] Unit/Room#: 4667/2573-64  Discharging Unit Phone Number: ***    Emergency Contact:   Extended Emergency Contact Information  Primary Emergency Contact: [facility]  Address: [address]           [place] [address] [place] [address] Phone: [phone]  Relation: Spouse  Secondary Emergency Contact: Thomas Mcgee  Address: [address]           [facility] [place], [place] [address] of [address] Phone: [phone]  Relation: Child    Past Surgical History:  Past Surgical History:   Procedure Laterality Date    CARDIAC SURGERY         Immunization History:   Immunization History   Administered Date(s) Administered    COVID-19, PFIZER PURPLE top, DILUTE for use, (age 15 y+), 30mcg/0.3mL 2021, 2021, 2021    Influenza 2010, 10/17/2012    Influenza Virus Vaccine 12/15/2011, 10/17/2012, 2013, 2014, 2015    Influenza, FLUAD, (age 72 y+), Adjuvanted, 0.5mL 2021, 10/05/2022    Influenza, High Dose (Fluzone 65 yrs and older) 12/15/2011, 2013, 2014, 2015, 2016, 2017    Influenza, Triv, inactivated, subunit, adjuvanted, IM (Fluad 65 yrs and older) 2020    Pneumococcal Conjugate 13-valent (Yvwmuaf97) 05/15/2017    Pneumococcal Polysaccharide (Cruhmhjsc15) 07/10/2009, 2013       Active Problems:  Patient Active Problem List   Diagnosis Code    Hypertension I10    Hyperlipidemia E78.5    CAD (coronary artery disease) I25.10    BPH (benign prostatic hyperplasia) N40.0    Elevated PSA R97.20    Prostate cancer ([facility] C61    Primary osteoarthritis of left knee M17.12    Contusion of left knee S80. 02XA    Tremor of right hand R25.1    Balance disorder R26.89    Impaired memory R41. 3    Shortness of breath R06.02    Acute congestive heart failure (HCC) I50.9    COVID-19 virus infection U07.1       Isolation/Infection:   Isolation            Droplet Plus          Patient Infection Status       Infection Onset Added Last Indicated Last Indicated By Review Planned Expiration Resolved Resolved By    COVID-19 22 COVID-19 & Influenza Combo 22      Resolved    COVID-19 (Rule Out) 22 COVID-19 & Influenza Combo (Ordered)   22 Rule-Out Test Resulted            Nurse Assessment:  Last Vital Signs: /81   Pulse 74   Temp 97.4 °F (36.3 °C) (Oral)   Resp 18   Ht 6' 2\" (1.88 m)   Wt 140 lb 14 oz (63.9 kg)   SpO2 95%   BMI 18.09 kg/m²     Last documented pain score (0-10 scale): Pain Level: 0  Last Weight:   Wt Readings from Last 1 Encounters:   22 140 lb 14 oz (63.9 kg)     Mental Status:  {IP PT MENTAL STATUS:87103}    IV Access:  { FLORES IV ACCESS:397619757}    Nursing Mobility/ADLs:  Walking   {P DME XWIJ:088204862}  Transfer  {P DME PGXH:925435985}  Bathing  {P DME DERJ:256610473}  Dressing  {P DME JHGB:579533396}  Toileting  {[facility] DME FLKS:920610637}  Feeding  {P DME AYN}  Med Admin  {P DME KKDY:015811135}  Med Delivery   { FLORES MED Delivery:074195022}    Wound Care Documentation and Therapy:        Elimination:  Continence: Bowel: {YES / L}  Bladder: {YES / UL:23659}  Urinary Catheter: {Urinary Catheter:438289571}   Colostomy/Ileostomy/Ileal Conduit: {YES / DW:94284}       Date of Last BM: ***    Intake/Output Summary (Last 24 hours) at 2022 1633  Last data filed at 2022 0956  Gross per 24 hour   Intake 360 ml   Output 1000 ml   Net -640 ml     I/O last 3 completed shifts:   In: 695 [P.O.:695]  Out: 2925 [Urine:2925]    Safety Concerns:     [address] Concerns:793694137}    Impairments/Disabilities: [employer] Impairments/Disabilities:696704122}    Nutrition Therapy:  Current Nutrition Therapy:   [address] Diet List:751398499}    Routes of Feeding: {CHP DME Other Feedings:192380897}  Liquids: {Slp liquid thickness:79842}  Daily Fluid Restriction: {CHP DME Yes amt example:692264518}  Last Modified Barium Swallow with Video (Video Swallowing Test): {Done Not Done GERP:430137011}    Treatments at the Time of Hospital Discharge:   Respiratory Treatments: ***  Oxygen Therapy:  {Therapy; copd oxygen:93497}  Ventilator:    {MH CC Vent LQHL:065884656}    Heart Failure Instructions for Daily Management  Patient was treated for acute on chronic combined systolic and diastolic heart failure. he  will require the following:    Please weigh daily on the same scale and approximately the same time of day. Report weight gain of 3 pounds/day or 5 pounds/week to : Ramesh Hoang -[phone] and [address] [phone]. Please use hospital discharge weight as baseline reference. Please monitor for signs and symptoms of and report to MD:  Worsening Heart Failure: sudden weight gain, shortness of breath, lower extremity or general edema/swelling, abdominal bloating/swelling, inability to lie flat, intolerance to usual activity, or cough (especially at night). Report these finding even if no increase in weight. Dehydration:  having difficulty or a decrease in urination, dizziness, worsening fatigue, or new onset/worsening of generalized weakness. Please continue a LOW SODIUM diet and LIMIT fluid intake to 48 - 64 ounces ( 1.5 - 2 liters) per day. Call Ramesh Hoang [phone] and [address] [phone] with any questions or concerns. Please continue heart failure education to patient and family/support system. See After Visit Summary for hospital follow up appointment details.   Consider spiritual care referral for support and/or completion of advance directives . Consider: [place] [address] telehealth program if patient agreeable and able to participate and palliative care consult for ongoing goals of care, end of life, and/or chronic disease management discussions. Patient's primary cardiologist is Dr Suad Ramirez. Rehab Therapies: {THERAPEUTIC INTERVENTION:7179122378}  Weight Bearing Status/Restrictions: {MH CC Weight Bearin}  Other Medical Equipment (for information only, NOT a DME order):  {EQUIPMENT:078632380}  Other Treatments: ***    Patient's personal belongings (please select all that are sent with patient):  {CHP DME Belongings:294307865}    RN SIGNATURE:  {Esignature:893919346}    CASE MANAGEMENT/SOCIAL WORK SECTION    Inpatient Status Date: ***    Readmission Risk Assessment Score:  Readmission Risk              Risk of Unplanned Readmission:  14           Discharging to Facility/ Agency   Name:   Address:  Phone:  Fax:    Dialysis Facility (if applicable)   Name:  Address:  Dialysis Schedule:  Phone:  Fax:    / signature: {Esignature:861079127}    PHYSICIAN SECTION    Prognosis: Guarded    Condition at Discharge: Stable    Rehab Potential (if transferring to Rehab): Fair    Recommended Labs or Other Treatments After Discharge:     Physician Certification: I certify the above information and transfer of Tor Landa  is necessary for the continuing treatment of the diagnosis listed and that he requires [address] for greater 30 days.      Update Admission H&P: No change in H&P    PHYSICIAN SIGNATURE:  Electronically signed by Hilary Cobb MD on 11/10/22 at 1:24 PM EST

## 2022-11-08 NOTE — PROGRESS NOTES
[facility]   Cardiology  Note   Dr Bhupinder Hurd MD, Milton Rader RN, FNP APRN CVNP  Date: 11/8/2022  Admit Date: 11/4/2022       CC/cardiology consult: Dizziness, MS changes  PMH: CAD/CABG /  2015 neg stress test / HTN balance disorder / dementia     Interval Hx /  Subjective: Today, he is resting in bed, VSS   NSVT  / Increase Toprol 25mg daily to BID   No new complaints today. No major events overnight. Denies having chest pain, palpitations, shortness of breath, orthopnea/PND, cough, or dizziness at the time of this visit. In Covid isolation      Patient seen and examined. Clinical notes reviewed.  Telemetry reviewed / Pertinent labs, diagnostic, device, and imaging results reviewed as a part of this visit  I spent a total of 35 minutes and greater than 50% of the time was spent counseling with patient  coordinating care regarding her diagnosis, treatments and plan of care    Scheduled Meds:   potassium chloride  20 mEq Oral Daily with breakfast    atorvastatin  40 mg Oral Daily    lisinopril  10 mg Oral Daily    metoprolol succinate  25 mg Oral Daily    oxybutynin  5 mg Oral Daily    tamsulosin  0.4 mg Oral Daily    sodium chloride flush  5-40 mL IntraVENous 2 times per day    enoxaparin  40 mg SubCUTAneous Daily    furosemide  40 mg IntraVENous BID    aspirin  81 mg Oral Daily     Vitals:    11/08/22 1231   BP: 122/81   Pulse: 73   Resp: 18   Temp: 97.4 °F (36.3 °C)   SpO2: 95%      In: 480 [P.O.:480]  Out: 2375    Wt Readings from Last 3 Encounters:   11/08/22 140 lb 14 oz (63.9 kg)   10/26/22 154 lb 6.4 oz (70 kg)   10/05/22 156 lb 12.8 oz (71.1 kg)       Intake/Output Summary (Last 24 hours) at 11/8/2022 1246  Last data filed at 11/8/2022 0956  Gross per 24 hour   Intake 360 ml   Output 1100 ml   Net -740 ml       Telemetry: Personally Reviewed    Constitutional: Cooperative and in no apparent distress, and appears well nourished  Skin: Warm and pink; no pallor, cyanosis, clubbing, or bruising   HEENT: Symmetric and normocephalic. Cardiovascular: Regular rate and rhythm. S1/S2 present with murmur,  Respiratory: Respirations symmetric and unlabored. Lungs clear to auscultation bilaterally, no wheezing, crackles, or rhonchi  Gastrointestinal: Abdomen soft and round. Bowel sounds normoactive without tenderness or masses. Musculoskeletal: Bilateral upper and lower extremity strength 5/5 with full ROM  Neurologic/Psych: Awake and orientated to person, place and time. Calm affect, appropriate mood    Patient Active Problem List    Diagnosis Date Noted    COVID-19 virus infection 11/07/2022    Shortness of breath 11/05/2022    Acute congestive heart failure ([facility] 11/05/2022    Tremor of right hand 06/13/2021    Balance disorder 06/13/2021    Impaired memory 06/13/2021    Primary osteoarthritis of left knee 04/21/2016    Contusion of left knee 04/21/2016    Prostate cancer ([facility] 07/01/2015    Elevated PSA 01/18/2013    BPH (benign prostatic hyperplasia) 12/15/2011    Hypertension 09/18/2011    Hyperlipidemia 09/18/2011    CAD (coronary artery disease) 09/18/2011        Assessment     new onset CHF / HFrEF   continue IV Lasix  fluid restriction low salt diet /sCr wnl   Net -1405 ProBNP 7K  /sCr wnl     ICM   11/2022 TTE  / EF 15-20%  moderate AS / m/m AR   Left ventricular cavity size is dilated. Normal left ventricular wall thickness. Overall left ventricular systolic function appears severely  reduced with an ejection fraction of 15-20%. There is severe diffuse hypokinesis. Diastolic filling parameters suggest grade I diastolic  dysfunction. Mild mitral regurgitation is present. The aortic valve leaflets are slightly thickened /calcified. Mild-to-moderate aortic  regurgitation is present. Moderate aortic stenosis with a peak velocity of 3.05m/s and a mean pressure gradient of 23mmHg. Mild  tricuspid regurgitation.  Estimated pulmonary artery systolic pressure is at 39 mmHg assuming a right atrial pressure of 15 mmHg. The right ventricle appears normal in size but hypokinetic. TAPSE measures 1.11cm and the RVS velocity measures 5.46 cm/s. There  is a left pleural effusion. IVC size is dilated (>2.1 cm) and collapses < 50% with respiration consistent with elevated RA pressure (15  mmHg).     COVID-19 positive  Managed per IM     CAD    Continue aspirin, statin     HTN   Controlled      BPH  Continue Flomax     Hypokalemia  Replace as needed     NSVT   Increase  Toprol 25 mg daily to BID     Plan   Remains in Covid isolation   Continue card meds asa / lipitor  / lasix  /  lisinopril  / Kika Sun  / Jose Hale con    Consider ischemic evaluation when other issues improved  Continue  BB/aceI with CM   plan to add SGLT2/ hydralazine /  imdur &  change to entresto as b/p and renals allow   Will follow    Thank you for allowing to us to participate in the care of Christoper Severs APRN-CNP-CVNP    [facility] [place]

## 2022-11-08 NOTE — PROGRESS NOTES
HOSPITAL MEDICINE  - PROGRESS NOTE    Admit Date: 11/4/2022         Interval History:  21-year-old male with CAD s/p CABG, HTN,BPH, dementia   -Admitted sec to dizziness, shortness of breath.    + short of breath over the last 3 to 4 weeks. + developed worsening lower extremity edema      Lab work showed elevated proBNP of 8000. -CXR --->bilateral pleural effusions. - was admitted to the hospital for further work-up and management. Subjective: no chest pain or SOB    Objective: Afebrile    Diet: ADULT DIET; Regular; Low Fat/Low Chol/High Fiber/2 gm Na; 1500 ml      Data:   Scheduled Meds: Reviewed  Continuous Infusions:   sodium chloride         Intake/Output Summary (Last 24 hours) at 11/8/2022 1533  Last data filed at 11/8/2022 0956  Gross per 24 hour   Intake 360 ml   Output 1000 ml   Net -640 ml     CBC:   Recent Labs     11/06/22  1005   WBC 4.0   HGB 14.2        BMP:  Recent Labs     11/08/22  0501      K 3.8      CO2 31   BUN 21*   CREATININE 0.8   GLUCOSE 88       Objective:   Vitals: /81   Pulse 74   Temp 97.4 °F (36.3 °C) (Oral)   Resp 18   Ht 6' 2\" (1.88 m)   Wt 140 lb 14 oz (63.9 kg)   SpO2 95%   BMI 18.09 kg/m²   General appearance: alert, appears stated age and cooperative  Skin: Skin color, texture, turgor normal.   HEENT: Head: Normocephalic, no lesions, without obvious abnormality.   Neck: supple  Lungs: clear to auscultation bilaterally  Heart: regular rate and rhythm, S1, S2 normal, no murmur, click, rub or gallop  Abdomen: soft, non-tender; bowel sounds normal; no masses,  no organomegaly  Extremities: no edema    Neurologic: Mental status: Alert, oriented, thought content appropriate      Assessment & Plan:    New onset CHF, unspecified type  EF 15-20%  -continue IV Lasix, fluid restriction  Daily weights,low salt diet  Cardiology consult appreciated     COVID-19 positive--  No O2 requirement  -continue to monitor     Coronary artery disease  - Continue aspirin, statin     Hypertension  - Continue metoprolol,lisinopril  -SGLT2,MRA to be added    BPH  - Continue Flomax     Hypokalemia  -replete        DVT Prophylaxis Lovenox  Diet: ADULT DIET;  Regular; Low Fat/Low Chol/High Fiber/2 gm Na; 1500 ml  Code Status: Full Code        Dispo - PT and OT eval's  Pending Cardiology eval      Cristiano Russo MD

## 2022-11-08 NOTE — PROGRESS NOTES
Spoke with patient's daughter Raegan Diaz at length about  patient's heart function. Informed her of echo findings (EF15-20%), and that pt had 2 runs of Vtach today. Cardiology increased metoprolol to 2x a day in response to [place] runs. Daughter wants everything done to prolong life. I informed her a stress test is likely the next step and that TANA Mac will consult with cardiology tomorrow regarding an ischemic workup.

## 2022-11-09 LAB
ANION GAP SERPL CALCULATED.3IONS-SCNC: 8 MMOL/L (ref 3–16)
BUN BLDV-MCNC: 21 MG/DL (ref 7–20)
CALCIUM SERPL-MCNC: 9 MG/DL (ref 8.3–10.6)
CHLORIDE BLD-SCNC: 101 MMOL/L (ref 99–110)
CO2: 32 MMOL/L (ref 21–32)
CREAT SERPL-MCNC: 1 MG/DL (ref 0.8–1.3)
GFR SERPL CREATININE-BSD FRML MDRD: >60 ML/MIN/{1.73_M2}
GLUCOSE BLD-MCNC: 93 MG/DL (ref 70–99)
MAGNESIUM: 1.9 MG/DL (ref 1.8–2.4)
POTASSIUM SERPL-SCNC: 3.9 MMOL/L (ref 3.5–5.1)
SODIUM BLD-SCNC: 141 MMOL/L (ref 136–145)

## 2022-11-09 PROCEDURE — 97116 GAIT TRAINING THERAPY: CPT

## 2022-11-09 PROCEDURE — 83735 ASSAY OF MAGNESIUM: CPT

## 2022-11-09 PROCEDURE — 36415 COLL VENOUS BLD VENIPUNCTURE: CPT

## 2022-11-09 PROCEDURE — 6370000000 HC RX 637 (ALT 250 FOR IP): Performed by: NURSE PRACTITIONER

## 2022-11-09 PROCEDURE — 80048 BASIC METABOLIC PNL TOTAL CA: CPT

## 2022-11-09 PROCEDURE — 2580000003 HC RX 258: Performed by: INTERNAL MEDICINE

## 2022-11-09 PROCEDURE — 6370000000 HC RX 637 (ALT 250 FOR IP): Performed by: INTERNAL MEDICINE

## 2022-11-09 PROCEDURE — 1200000000 HC SEMI PRIVATE

## 2022-11-09 PROCEDURE — 99233 SBSQ HOSP IP/OBS HIGH 50: CPT | Performed by: NURSE PRACTITIONER

## 2022-11-09 PROCEDURE — 97110 THERAPEUTIC EXERCISES: CPT

## 2022-11-09 PROCEDURE — 97530 THERAPEUTIC ACTIVITIES: CPT

## 2022-11-09 PROCEDURE — 6360000002 HC RX W HCPCS: Performed by: INTERNAL MEDICINE

## 2022-11-09 RX ORDER — SPIRONOLACTONE 25 MG/1
25 TABLET ORAL DAILY
Status: DISCONTINUED | OUTPATIENT
Start: 2022-11-09 | End: 2022-11-11 | Stop reason: HOSPADM

## 2022-11-09 RX ORDER — FUROSEMIDE 40 MG/1
40 TABLET ORAL DAILY
Status: DISCONTINUED | OUTPATIENT
Start: 2022-11-09 | End: 2022-11-11 | Stop reason: HOSPADM

## 2022-11-09 RX ADMIN — METOPROLOL SUCCINATE 25 MG: 25 TABLET, FILM COATED, EXTENDED RELEASE ORAL at 20:33

## 2022-11-09 RX ADMIN — SODIUM CHLORIDE, PRESERVATIVE FREE 5 ML: 5 INJECTION INTRAVENOUS at 20:34

## 2022-11-09 RX ADMIN — ASPIRIN 81 MG 81 MG: 81 TABLET ORAL at 09:14

## 2022-11-09 RX ADMIN — FUROSEMIDE 40 MG: 10 INJECTION, SOLUTION INTRAMUSCULAR; INTRAVENOUS at 09:14

## 2022-11-09 RX ADMIN — SPIRONOLACTONE 25 MG: 25 TABLET, FILM COATED ORAL at 16:15

## 2022-11-09 RX ADMIN — POTASSIUM CHLORIDE 20 MEQ: 1500 TABLET, EXTENDED RELEASE ORAL at 09:14

## 2022-11-09 RX ADMIN — ATORVASTATIN CALCIUM 40 MG: 40 TABLET, FILM COATED ORAL at 09:15

## 2022-11-09 RX ADMIN — SODIUM CHLORIDE, PRESERVATIVE FREE 10 ML: 5 INJECTION INTRAVENOUS at 09:15

## 2022-11-09 RX ADMIN — FUROSEMIDE 40 MG: 40 TABLET ORAL at 16:15

## 2022-11-09 RX ADMIN — TAMSULOSIN HYDROCHLORIDE 0.4 MG: 0.4 CAPSULE ORAL at 09:14

## 2022-11-09 RX ADMIN — LISINOPRIL 10 MG: 10 TABLET ORAL at 09:15

## 2022-11-09 RX ADMIN — OXYBUTYNIN CHLORIDE 5 MG: 5 TABLET, EXTENDED RELEASE ORAL at 09:14

## 2022-11-09 RX ADMIN — ENOXAPARIN SODIUM 40 MG: 100 INJECTION SUBCUTANEOUS at 09:14

## 2022-11-09 RX ADMIN — METOPROLOL SUCCINATE 25 MG: 25 TABLET, FILM COATED, EXTENDED RELEASE ORAL at 09:15

## 2022-11-09 ASSESSMENT — PAIN SCALES - GENERAL
PAINLEVEL_OUTOF10: 0

## 2022-11-09 NOTE — PROGRESS NOTES
Physician Progress Note      PATIENT:               Celso Moy  CSN #:                  [id number]  :                       1935  ADMIT DATE:       2022 10:03 PM  [address] [place] DATE:  RESPONDING  PROVIDER #:        Gloria Valenzuela MD          QUERY TEXT:    Pt admitted with short of breath over the last 3 to 4 weeks. + developed   worsening lower extremity edema. New onset CHF and has been documented. If   possible, please document in progress notes and discharge summary further   specificity regarding the type and acuity of CHF:    The medical record reflects the following:  Risk Factors: HTN  Clinical Indicators: per H/P Admitted sec to dizziness, shortness of breath+   short of breath over the last 3 to 4 weeks. + developed worsening lower extremity edema\"; Pro-ZBG=3376 pg/ml; ECHO: Left   ventricular cavity size is dilated. Normal left  ventricular wall thickness. Overall left ventricular systolic function appears   severely  reduced with an ejection fraction of 15-20%. There is severe diffuse   hypokinesis. Diastolic filling parameters suggest grade I  diastolic dysfunction. Treatment: CBC, CMP, Cardiology consult, ECHO, IV Lasix 40mg, Toprol XL PO,   Prinivil PO, Daily weights, V/S and I/O monitoring  per unit protocol  Options provided:  -- Acute Systolic CHF/HFrEF  -- Other - I will add my own diagnosis  -- Disagree - Not applicable / Not valid  -- Disagree - Clinically unable to determine / Unknown  -- Refer to Clinical Documentation Reviewer    PROVIDER RESPONSE TEXT:    This patient is in acute systolic CHF/HFrEF.     Query created by: Mariaelena Gerardo on 2022 12:35 PM      Electronically signed by:  Gloria Valenzuela MD 2022 12:41 PM

## 2022-11-09 NOTE — PROGRESS NOTES
Daughter Michelle Galeas [phone]  called and updated on how pt did overnight and to discuss plan of care. Would like to be updated when determination for ischemic workup/cardiology consultation has been reached.

## 2022-11-09 NOTE — PROGRESS NOTES
HOSPITAL MEDICINE  - PROGRESS NOTE    Admit Date: 11/4/2022         Interval History:  28-year-old male with CAD s/p CABG, HTN,BPH, dementia   -Admitted sec to dizziness, shortness of breath.    + short of breath over the last 3 to 4 weeks. + developed worsening lower extremity edema      Lab work showed elevated proBNP of 8000. -CXR --->bilateral pleural effusions. - was admitted to the hospital for further work-up and management. -HAS SENSORINEURAL HEARING LOSS    Subjective: no chest pain or SOB  Neg 1.5L overnight  Wt documented 121-likely unreliable as baseline wt appears closer to 150lbs    Objective: Afebrile    Diet: ADULT DIET; Regular; Low Fat/Low Chol/High Fiber/2 gm Na; 1500 ml      Data:   Scheduled Meds: Reviewed  Continuous Infusions:   sodium chloride         Intake/Output Summary (Last 24 hours) at 11/9/2022 1438  Last data filed at 11/9/2022 1427  Gross per 24 hour   Intake 780 ml   Output 1750 ml   Net -970 ml     CBC:   No results for input(s): WBC, HGB, PLT in the last 72 hours. BMP:  Recent Labs     11/09/22  0518      K 3.9      CO2 32   BUN 21*   CREATININE 1.0   GLUCOSE 93       Objective:   Vitals: /85   Pulse 85   Temp 98.3 °F (36.8 °C) (Oral)   Resp 20   Ht 6' 2\" (1.88 m)   Wt 129 lb 3 oz (58.6 kg)   SpO2 99%   BMI 16.59 kg/m²   General appearance: alert, appears stated age and cooperative  Skin: Skin color, texture, turgor normal.   HEENT: Head: Normocephalic, no lesions, without obvious abnormality.   Neck: supple  Lungs: clear to auscultation bilaterally  Heart: regular rate and rhythm, S1, S2 normal, no murmur, click, rub or gallop  Abdomen: soft, non-tender; bowel sounds normal; no masses,  no organomegaly  Extremities: no edema    Neurologic: Mental status: Alert, oriented, thought content appropriate      Assessment & Plan:    New onset CHF, unspecified type  EF 15-20%,mod AS  -continue IV Lasix, fluid restriction  Daily weights,low salt diet  Cardiology consult appreciated-Ischemic workup planned at some pt?outpt  Continue metoprolol,lisinopril  -SGLT2,MRA to be added     COVID-19 positive--  No O2 requirement  -continue to monitor     Coronary artery disease  - Continue aspirin, statin     Hypertension  - Continue metoprolol,lisinopril  -SGLT2,MRA to be added    BPH  - Continue Flomax     Hypokalemia  -replete        DVT Prophylaxis Lovenox  Diet: ADULT DIET; Regular; Low Fat/Low Chol/High Fiber/2 gm Na; 1500 ml  Code Status: Full Code        Dispo - PT and OT eval's  Pending Cardiology  plans    Pt does not appear volume overloaded clinically despite BNP  ? Neena Shabazz MD

## 2022-11-09 NOTE — PROGRESS NOTES
Spoke w/ daughter via phone. Provided updates. Daughter expressed desire for ischemic workup to be done in patient if possible.

## 2022-11-09 NOTE — CARE COORDINATION
CM following for discharge planning. Pt is from home with wife and daughter. Per daughter pt was independent with occasional use of cane and still driving. HHC and rolling walker recommended at discharge. Pt is positive for COVID-19. Cards following, med management, remains on IV lasix and IV mag prn.      Sakshi Amanda RN, BSN, [address]  Case Management Department  [phone]

## 2022-11-09 NOTE — PLAN OF CARE
Problem: Safety - Adult  Goal: Free from fall injury  11/9/2022 1431 by Renu Orozco RN  Outcome: Progressing  Flowsheets (Taken 11/9/2022 1431)  Free From Fall Injury: Instruct family/caregiver on patient safety  Note: Pt is a Fall Risk. See Kristy Gemma Fall Risk Score. Pt bed in low position and 2 side rails up. Call light and belongings in reach. Pt encouraged to call for assistance. [employer] monitoring in place. Bed alarm on and bed wheels locked. Will continue with hourly rounds for PO intake, pain needs, toileting, and repositioning as needed. Problem: Respiratory - Adult  Goal: Achieves optimal ventilation and oxygenation  11/9/2022 1431 by Renu Orozco RN  Outcome: Progressing  Flowsheets (Taken 11/9/2022 1431)  Achieves optimal ventilation and oxygenation:   Assess for changes in respiratory status   Assess for changes in mentation and behavior   Position to facilitate oxygenation and minimize respiratory effort   Assess the need for suctioning and aspirate as needed   Assess and instruct to report shortness of breath or any respiratory difficulty  Note: Pt normal Resp rate and effort. Pt denies SOB and dyspnea. SpO2 is > 95% on room air. Problem: Cardiovascular - Adult  Goal: Maintains optimal cardiac output and hemodynamic stability  11/9/2022 1431 by Renu Orozco RN  Outcome: Progressing  Flowsheets (Taken 11/9/2022 1431)  Maintains optimal cardiac output and hemodynamic stability:   Monitor blood pressure and heart rate   Monitor urine output and notify Licensed Independent Practitioner for values outside of normal range   Assess for signs of decreased cardiac output  Note: V/S stable. Urine output adequate. I&Os documented. Pt NSR on cont telemetry.

## 2022-11-09 NOTE — PROGRESS NOTES
[facility]   Cardiology  Note   Dr Mauri Serrano MD, Carlos Scales RN, FNP APRN CVNP  Date: 11/9/2022  Admit Date: 11/4/2022       CC/cardiology consult: Dizziness, MS changes  PMH: CAD/CABG /  2015 neg stress test / HTN balance disorder / dementia     Interval Hx /  Subjective: Today, he is resting in bed, VSS / in NSR   eating breakfast, alert pleasant   On 11/8/222 had NSVT  / Increase Toprol 25mg daily to BID   No new complaints today. No major events overnight. Denies having chest pain, palpitations, shortness of breath, orthopnea/PND, cough, or dizziness at the time of this visit. In Covid isolation      Patient seen and examined. Clinical notes reviewed.  Telemetry reviewed / Pertinent labs, diagnostic, device, and imaging results reviewed as a part of this visit  I spent a total of 35 minutes and greater than 50% of the time was spent counseling with patient  coordinating care regarding her diagnosis, treatments and plan of care    Scheduled Meds:   metoprolol succinate  25 mg Oral BID    potassium chloride  20 mEq Oral Daily with breakfast    atorvastatin  40 mg Oral Daily    lisinopril  10 mg Oral Daily    oxybutynin  5 mg Oral Daily    tamsulosin  0.4 mg Oral Daily    sodium chloride flush  5-40 mL IntraVENous 2 times per day    enoxaparin  40 mg SubCUTAneous Daily    furosemide  40 mg IntraVENous BID    aspirin  81 mg Oral Daily     Vitals:    11/09/22 1045   BP:    Pulse: 72   Resp:    Temp:    SpO2:       In: 720 [P.O.:720]  Out: 1750    Wt Readings from Last 3 Encounters:   11/09/22 129 lb 3 oz (58.6 kg)   10/26/22 154 lb 6.4 oz (70 kg)   10/05/22 156 lb 12.8 oz (71.1 kg)       Intake/Output Summary (Last 24 hours) at 11/9/2022 1148  Last data filed at 11/9/2022 1121  Gross per 24 hour   Intake 480 ml   Output 1750 ml   Net -1270 ml       Telemetry: Personally Reviewed    Constitutional: Cooperative and in no apparent distress, and appears well nourished  Skin: Warm and pink; no pallor, cyanosis, clubbing, or bruising   HEENT: Symmetric and normocephalic. Cardiovascular: Regular rate and rhythm. S1/S2 present with murmur,  Respiratory: Respirations symmetric and unlabored. Lungs clear to auscultation bilaterally, no wheezing, crackles, or rhonchi  Gastrointestinal: Abdomen soft and round. Bowel sounds normoactive without tenderness or masses. Musculoskeletal: Bilateral upper and lower extremity strength 5/5 with full ROM  Neurologic/Psych: Awake and orientated to person, place and time. Calm affect, appropriate mood    Patient Active Problem List    Diagnosis Date Noted    COVID-19 virus infection 11/07/2022    Shortness of breath 11/05/2022    Acute congestive heart failure ([facility] [address] 11/05/2022    Tremor of right hand 06/13/2021    Balance disorder 06/13/2021    Impaired memory 06/13/2021    Primary osteoarthritis of left knee 04/21/2016    Contusion of left knee 04/21/2016    Prostate cancer ([facility] [address] 07/01/2015    Elevated PSA 01/18/2013    BPH (benign prostatic hyperplasia) 12/15/2011    Hypertension 09/18/2011    Hyperlipidemia 09/18/2011    CAD (coronary artery disease) 09/18/2011        Assessment     new onset CHF / HFrEF   continue IV Lasix  fluid restriction low salt diet /sCr wnl   Net -1405 ProBNP 7K  /sCr wnl     ICM   11/2022 TTE  / EF 15-20%  moderate AS / m/m AR   Left ventricular cavity size is dilated. Normal left ventricular wall thickness. Overall left ventricular systolic function appears severely  reduced with an ejection fraction of 15-20%. There is severe diffuse hypokinesis. Diastolic filling parameters suggest grade I diastolic  dysfunction. Mild mitral regurgitation is present. The aortic valve leaflets are slightly thickened /calcified. Mild-to-moderate aortic  regurgitation is present. Moderate aortic stenosis with a peak velocity of 3.05m/s and a mean pressure gradient of 23mmHg. Mild  tricuspid regurgitation.  Estimated pulmonary artery systolic pressure is at 39 mmHg assuming a right atrial pressure of 15 mmHg. The right ventricle appears normal in size but hypokinetic. TAPSE measures 1.11cm and the RVS velocity measures 5.46 cm/s. There  is a left pleural effusion. IVC size is dilated (>2.1 cm) and collapses < 50% with respiration consistent with elevated RA pressure (15  mmHg). COVID-19 positive  Managed per IM     CAD    Continue aspirin, statin     HTN   Controlled      BPH  Continue Flomax     Hypokalemia  Replace as needed     NSVT   Increase  Toprol 25 mg daily to BID     Plan   Net -2135  appears near compensated / on lasix 40mg IVP BID /sCr wnl   Remains in Covid isolation   Continue card meds asa / lipitor  / lasix  /  lisinopril  / Anai Blend  / Calvin Reges con    Consider ischemic evaluation when other issues improved & out of isolation / this could be completed as out pt    Continue  BB  Stop lisinopril / add entresto 24/26  BID in 36 hours after washout period   Add aldactone 25 mg po daily   Change lasix  IVP to oral lasix 40 mg daily   plan to add SGLT2 .  Imdur hydralazine as out pt if renals / B/P allow  Consider [employer] life vest on discharge   Spoke with daughter, Catie Beasley, concerning his TTE results & consider ischemic workup in future    TTE in 3  consider ICD   Fu in office in approx a week     Thank you for allowing to us to participate in the care of Olivia ROMERO-CNP-CVNP    [facility]

## 2022-11-09 NOTE — PLAN OF CARE
Problem: Discharge Planning  Goal: Discharge to home or other facility with appropriate resources  Outcome: Progressing     Problem: Safety - Adult  Goal: Free from fall injury  Outcome: Progressing     Problem: Respiratory - Adult  Goal: Achieves optimal ventilation and oxygenation  Outcome: Progressing     Problem: Cardiovascular - Adult  Goal: Maintains optimal cardiac output and hemodynamic stability  Outcome: Progressing  Goal: Absence of cardiac dysrhythmias or at baseline  Outcome: Progressing     Problem: Metabolic/Fluid and Electrolytes - Adult  Goal: Electrolytes maintained within normal limits  Outcome: Progressing  Goal: Hemodynamic stability and optimal renal function maintained  Outcome: Progressing

## 2022-11-09 NOTE — PROGRESS NOTES
Physical Therapy  Facility/Department: [place] [address] [facility]  Physical Therapy Daily Treatment      Name: Dahlia Elizondo  : 1935  MRN: [id number]  Date of Service: 2022    Discharge Recommendations: Dahlia Elizondo scored a 18/24 on the AM-PAC short mobility form. Current research shows that an AM-PAC score of 18 or greater is typically associated with a discharge to the patient's home setting. Based on the patient's AM-PAC score and their current functional mobility deficits, it is recommended that the patient have 2-3 sessions per week of Physical Therapy at d/c to increase the patient's independence. At this time, this patient demonstrates the endurance and safety to discharge home with (home vs OP services) and a follow up treatment frequency of 2-3x/wk. Please see assessment section for further patient specific details. If patient discharges prior to next session this note will serve as a discharge summary. Please see below for the latest assessment towards goals. PT Equipment Recommendations  Equipment Needed: Yes  Mobility Devices: Liza Dilling: Rolling  Other: If he does not have one at home. Patient Diagnosis(es): The primary encounter diagnosis was COVID-19 virus infection. Diagnoses of Bilateral pleural effusion and Acute combined systolic and diastolic congestive heart failure ([address] were also pertinent to this visit. Past Medical History:  has a past medical history of Arthritis, Balance disorder, CAD (coronary artery disease), Chest pain, Dental disease, Dizziness, Dyslipidemia, Fatigue, Hard of hearing, History of prostate cancer, Hypertension, Hyperthyroidism, Impaired memory, SOB (shortness of breath), and Vitamin D deficiency. Past Surgical History:  has a past surgical history that includes Cardiac surgery. Assessment   Body Structures, Functions, Activity Limitations Requiring Skilled Therapeutic Intervention: Decreased functional mobility ; Decreased safe awareness;Decreased cognition;Decreased balance;Decreased endurance  Assessment: Pt continues to be confused during the session, requiring multiple verbal cues for all activities. Once in standing patient requested to use the restroom and was using the commode for an extended period of time. Pt currently using the rolling walker for all ambulation CGA-MIn A. With turns and fatigue patient had random LOB and needed correction. Pt continues to be at a risk for falls. IF patient is to return home 24 hr hand held direct assistance is needed. If unable to accomodate patient will need continued IP therapy in order to improve functional mobility, independence and safety. Will continue to follow. Treatment Diagnosis: impaired functional mobility 2/2 decreased balance  Decision Making: Medium Complexity  Requires PT Follow-Up: Yes  Activity Tolerance  Activity Tolerance: Patient tolerated treatment well     Plan   Physcial Therapy Plan  General Plan:  (2-5)  Current Treatment Recommendations: Strengthening, Balance training, Functional mobility training, Gait training, Stair training, Patient/Caregiver education & training, Safety education & training, Endurance training  Safety Devices  Type of Devices: Call light within reach, Chair alarm in place, Nurse notified, Left in chair, All fall risk precautions in place     Restrictions  Position Activity Restriction  Other position/activity restrictions: Up as toplerated     Subjective   General  Chart Reviewed: Yes  Patient assessed for rehabilitation services?: Yes  Additional Pertinent Hx: Pt is an 82 y.o. male adm 11/4 with SOB. Pt presented to the ED with his son states over the last few days he has not been himself. He has been less active and occasionally \"talking out of his head\". CXR:Small bilateral pleural effusions and bibasilar airspace disease, greater on the left. Consider pneumonia versus atelectasis.    PMH:hypertension, dyslipidemia, coronary artery disease post remote CABG and dementia  Referring Practitioner: [facility], MD  Referral Date : 11/05/22  Subjective  Subjective: Pt found supine. Agreeable to PT. Denies pain. Slightly confused. Social/Functional History  Social/Functional History  Lives With: Spouse  Type of Home: House  Home Layout: Multi-level, Able to Live on Main level with bedroom/bathroom  Home Access:  (2 GEETHA)  Bathroom Shower/Tub: Tub/Shower unit  Bathroom Toilet: Handicap height  Home Equipment: Cane  Has the patient had two or more falls in the past year or any fall with injury in the past year?:  (Reports 2 falls in the last year)  ADL Assistance: Independent  Ambulation Assistance: Independent (reports was using cane recently but was thinking of starting to use walker)  Transfer Assistance: Independent  Type of Occupation: worked for [employer]  Additional Comments: reports wife had a stroke - daughter and son assist them. Above info per pt report - question reliability.   History mildly difficult to obtain 2/2 [place]  Vision/Hearing  Vision  Vision: Within Functional Limits  Hearing  Hearing: Exceptions to [facility]  Hearing Exceptions: Hard of hearing/hearing concerns    Cognition   Orientation  Orientation Level: Oriented to person;Disoriented to time;Oriented to place     Objective   Heart Rate: 85  Heart Rate Source: Monitor  BP: 123/85  BP Location: Left upper arm  BP Method: Automatic  Patient Position: Semi fowlers  MAP (Calculated): 98  Resp: 20  SpO2: 99 %  O2 Device: None (Room air)        Gross Assessment  AROM: Generally decreased, functional  Strength: Generally decreased, functional                    Bed mobility  Supine to Sit: Stand by assistance (HOB elevated, handrails)  Transfers  Sit to Stand: Contact guard assistance  Stand to Sit: Contact guard assistance  Ambulation  Surface: Level tile  Device: Rolling Walker  Assistance: Minimal assistance  Quality of Gait: unsteady, slightly LOB with turns, right lean, decreased bilat step length/height  Gait Deviations: Slow Mamie  Distance: 5ft+10ftx2     Balance  Sitting - Static: Good  Sitting - Dynamic: Good  Standing - Static: Fair  Exercise Treatment: Seated Marches, LAQ, Toe heel raises, gluteal sequeezes, chair push ups,2x10 3 second holds,  5xsts        OutComes Score      AM-PAC Score  AM-PAC Inpatient Mobility Raw Score : 18 (11/09/22 1532)  AM-PAC Inpatient T-Scale Score : 43.63 (11/09/22 1532)  Mobility Inpatient CMS 0-100% Score: 46.58 (11/09/22 1532)  Mobility Inpatient CMS G-Code Modifier : CK (11/09/22 1532)          Tinneti Score       Goals  Short Term Goals  Time Frame for Short Term Goals: By discharge- ongoing  Short Term Goal 1: Sup to sit supervision with bed flat and without rail  Short Term Goal 2: Pt will transfer sit to stand supervision  Short Term Goal 3: Pt will amb >100' with LRAD supervision  Short Term Goal 4: Pt will up/down 2 steps with LRAD SBA       Education  Patient Education  Education Given To: Patient  Education Provided: Role of Therapy;Plan of Care  Education Method: Verbal  Barriers to Learning: None  Education Outcome: Verbalized understanding;Continued education needed      Therapy Time   Individual Concurrent Group Co-treatment   Time In 1410         Time Out 1503         Minutes 53          Timed Code Treatment Minutes:   53    Total Treatment Minutes:  [address] [place] [address], PT

## 2022-11-10 PROCEDURE — 97535 SELF CARE MNGMENT TRAINING: CPT

## 2022-11-10 PROCEDURE — 97116 GAIT TRAINING THERAPY: CPT

## 2022-11-10 PROCEDURE — 99232 SBSQ HOSP IP/OBS MODERATE 35: CPT | Performed by: NURSE PRACTITIONER

## 2022-11-10 PROCEDURE — 6370000000 HC RX 637 (ALT 250 FOR IP): Performed by: NURSE PRACTITIONER

## 2022-11-10 PROCEDURE — 6360000002 HC RX W HCPCS: Performed by: INTERNAL MEDICINE

## 2022-11-10 PROCEDURE — 6370000000 HC RX 637 (ALT 250 FOR IP): Performed by: INTERNAL MEDICINE

## 2022-11-10 PROCEDURE — 1200000000 HC SEMI PRIVATE

## 2022-11-10 PROCEDURE — 6370000000 HC RX 637 (ALT 250 FOR IP): Performed by: HOSPITALIST

## 2022-11-10 PROCEDURE — 2580000003 HC RX 258: Performed by: INTERNAL MEDICINE

## 2022-11-10 RX ORDER — ASPIRIN 81 MG/1
81 TABLET, CHEWABLE ORAL DAILY
Qty: 30 TABLET | Refills: 3 | Status: SHIPPED | OUTPATIENT
Start: 2022-11-11

## 2022-11-10 RX ORDER — SPIRONOLACTONE 25 MG/1
25 TABLET ORAL DAILY
Qty: 30 TABLET | Refills: 3 | Status: SHIPPED | OUTPATIENT
Start: 2022-11-11

## 2022-11-10 RX ORDER — FUROSEMIDE 40 MG/1
40 TABLET ORAL DAILY
Qty: 60 TABLET | Refills: 3 | Status: SHIPPED | OUTPATIENT
Start: 2022-11-11

## 2022-11-10 RX ADMIN — ATORVASTATIN CALCIUM 40 MG: 40 TABLET, FILM COATED ORAL at 09:55

## 2022-11-10 RX ADMIN — ASPIRIN 81 MG 81 MG: 81 TABLET ORAL at 09:55

## 2022-11-10 RX ADMIN — TAMSULOSIN HYDROCHLORIDE 0.4 MG: 0.4 CAPSULE ORAL at 09:55

## 2022-11-10 RX ADMIN — ENOXAPARIN SODIUM 40 MG: 100 INJECTION SUBCUTANEOUS at 09:59

## 2022-11-10 RX ADMIN — SODIUM CHLORIDE, PRESERVATIVE FREE 10 ML: 5 INJECTION INTRAVENOUS at 09:59

## 2022-11-10 RX ADMIN — SACUBITRIL AND VALSARTAN 1 TABLET: 24; 26 TABLET, FILM COATED ORAL at 21:29

## 2022-11-10 RX ADMIN — SODIUM CHLORIDE, PRESERVATIVE FREE 5 ML: 5 INJECTION INTRAVENOUS at 21:32

## 2022-11-10 RX ADMIN — METOPROLOL SUCCINATE 25 MG: 25 TABLET, FILM COATED, EXTENDED RELEASE ORAL at 10:37

## 2022-11-10 RX ADMIN — METOPROLOL SUCCINATE 25 MG: 25 TABLET, FILM COATED, EXTENDED RELEASE ORAL at 21:29

## 2022-11-10 RX ADMIN — OXYBUTYNIN CHLORIDE 5 MG: 5 TABLET, EXTENDED RELEASE ORAL at 09:55

## 2022-11-10 RX ADMIN — SPIRONOLACTONE 25 MG: 25 TABLET, FILM COATED ORAL at 09:55

## 2022-11-10 RX ADMIN — POTASSIUM CHLORIDE 20 MEQ: 1500 TABLET, EXTENDED RELEASE ORAL at 11:00

## 2022-11-10 RX ADMIN — FUROSEMIDE 40 MG: 40 TABLET ORAL at 09:55

## 2022-11-10 ASSESSMENT — PAIN SCALES - GENERAL
PAINLEVEL_OUTOF10: 0

## 2022-11-10 NOTE — PROGRESS NOTES
Comprehensive Nutrition Assessment    RECOMMENDATIONS:  PO Diet: Continue low fat/low chol/high fiber/2g sodium diet with 1500 ml fluids  ONS: Add Ensure compact BID  Nutrition Education: Education not indicated     NUTRITION ASSESSMENT:   Nutritional summary & status: Pt has underweight BMI. Pt has COVID. Attempted to call but no answer. Per documentation, Pt has lost 15.4% body weight in 1 month. Meal intakes show that he is eating % of meals. Wt loss has been recorded since admission. Restrictive diet does not seem to be inhibiting intake. Will keep current diet order but order add ONS to stabilize weight. Ensure compact will be ordered due to fluid restriction. Admission/PMH: Hx of coronary artery disease status post CABG, hypertension, benign prostatic hyperplasia, dementia presented to the hospital due to dizziness, shortness of breath. MALNUTRITION ASSESSMENT  Context of Malnutrition: Chronic Illness   Malnutrition Status:  At risk for malnutrition (Comment)  Findings of the 6 clinical characteristics of malnutrition (Minimum of 2 out of 6 clinical characteristics is required to make the diagnosis of moderate or severe Protein Calorie Malnutrition based on AND/ASPEN Guidelines):  Energy Intake:  No significant decrease in energy intake  Weight Loss:  Greater than 5% over 1 month     Body Fat Loss:  Unable to assess     Muscle Mass Loss:  Unable to assess    Fluid Accumulation:  No significant fluid accumulation     Strength:  Not Performed    NUTRITION DIAGNOSIS   Unintended weight loss related to impaired respiratory function as evidenced by weight loss    Nutrition Monitoring and Evaluation:   Food/Nutrient Intake Outcomes:  Food and Nutrient Intake, Supplement Intake  Physical Signs/Symptoms Outcomes:  Biochemical Data, Nutrition Focused Physical Findings, Weight     OBJECTIVE DATA: Significant to nutrition assessment  Nutrition Related Findings: Trace BLE edema, nutrition related labs normal  Wounds: None  Nutrition Goals: Meet at least 75% of estimated needs, by next RD assessment     CURRENT NUTRITION THERAPIES  ADULT DIET; Regular; Low Fat/Low Chol/High Fiber/2 gm Na; 1500 ml  PO Intake: %   PO Supplement Intake:None Ordered  Additional Sources of Calories/IVF:      ANTHROPOMETRICS  Current Height: 6' 2\" (188 cm)  Current Weight: 132 lb 11.5 oz (60.2 kg)    Admission weight: 159 lb 1.6 oz (72.2 kg)  Ideal Body Weight (IBW): 190 lbs  (86 kg)    BMI: 17.1    COMPARATIVE STANDARDS  Energy (kcal):  1806 - 2107 (30 - 35 kcals/kg)     Protein (g):  90 (1.5 g/kg)       Fluid (mL/day):  1806 - 2107 (1ml/kcal) or per provider    The patient will be monitored per nutrition standards of care. Consult dietitian if additional nutrition interventions are needed prior to RD reassessment.      Dusty Culp, [address], [place]. \Hospital Has a New Name and Outlook.\"":  [phone]  Office:  [phone]

## 2022-11-10 NOTE — CARE COORDINATION
CTN contacted Sophia with [employer] [phone]. They have accepted this patient and will pull referral from [facility].  They will contact patient and make arrangements for [facility] by 11/12  Electronically signed by Rashawn Murillo LPN on 75/99/8837 at 2:22 PM

## 2022-11-10 NOTE — PLAN OF CARE
Problem: Discharge Planning  Goal: Discharge to home or other facility with appropriate resources  Outcome: Progressing  Flowsheets (Taken 11/10/2022 1804)  Discharge to home or other facility with appropriate resources:   Identify barriers to discharge with patient and caregiver   Arrange for needed discharge resources and transportation as appropriate   Identify discharge learning needs (meds, wound care, etc)  Note: V/S stable. No c/o of pain. Pt to d/c tomorrow after lifevest placement (scheduled between 10 a-1 pm). Family updated. Pt to return home w/ home health care. Problem: Safety - Adult  Goal: Free from fall injury  Outcome: Progressing  Flowsheets (Taken 11/10/2022 1804)  Free From Fall Injury: Instruct family/caregiver on patient safety  Note: Pt is a Fall Risk. See [place] Fall Risk Score. Pt bed in low position and 2 side rails up. Call light and belongings in reach. Pt encouraged to call for assistance. Bed/chair alarm utilized. [employer] monitoring in place. Will continue with hourly rounds for PO intake, pain needs, toileting, and repositioning as needed. Problem: Chronic Conditions and Co-morbidities  Goal: Patient's chronic conditions and co-morbidity symptoms are monitored and maintained or improved  Outcome: Progressing  Flowsheets (Taken 11/10/2022 1804)  Care Plan - Patient's Chronic Conditions and Co-Morbidity Symptoms are Monitored and Maintained or Improved:   Monitor and assess patient's chronic conditions and comorbid symptoms for stability, deterioration, or improvement   Collaborate with multidisciplinary team to address chronic and comorbid conditions and prevent exacerbation or deterioration  Note: Pt v/s stable. Pt to be d/c w/ life vest.  Outpt ischemic workup to be completed per cardiology.

## 2022-11-10 NOTE — DISCHARGE SUMMARY
spontaneously. Psychiatry: Appropriate affect. Not agitated. Skin: Warm, dry with normal turgor. No rash        Significant Diagnostic Studies:    See above        Treatments: As above. Discharge Medications:     Medication List        START taking these medications      aspirin 81 MG chewable tablet  Take 1 tablet by mouth daily  Start taking on: November 11, 2022     sacubitril-valsartan 24-26 MG per tablet  Commonly known as: ENTRESTO  Take 1 tablet by mouth 2 times daily     spironolactone 25 MG tablet  Commonly known as: ALDACTONE  Take 1 tablet by mouth daily  Start taking on: November 11, 2022            CHANGE how you take these medications      furosemide 40 MG tablet  Commonly known as: LASIX  Take 1 tablet by mouth daily  Start taking on: November 11, 2022  What changed:   medication strength  how much to take  when to take this  reasons to take this            CONTINUE taking these medications      atorvastatin 40 MG tablet  Commonly known as: LIPITOR  Take 1 tablet by mouth daily for cholesterol.      metoprolol succinate 25 MG extended release tablet  Commonly known as: TOPROL XL  TAKE 1 TABLET BY MOUTH DAILY FOR BLOOD PRESSURE AND HEART     oxybutynin 5 MG extended release tablet  Commonly known as: Ditropan XL  Take 1 tablet by mouth daily     tamsulosin 0.4 MG capsule  Commonly known as: FLOMAX            STOP taking these medications      donepezil 10 MG tablet  Commonly known as: ARICEPT     lisinopril 10 MG tablet  Commonly known as: PRINIVIL;ZESTRIL     trospium 60 MG Cp24 extended release capsule  Commonly known as: SANCTURA               Where to Get Your Medications        These medications were sent to [address] [place] [address] [address] [place] [address]      Phone: [phone]   aspirin 81 MG chewable tablet  furosemide 40 MG tablet  sacubitril-valsartan 24-26 MG per tablet  spironolactone 25 MG tablet         Activity: activity as tolerated  Diet: ADULT DIET; Regular; Low Fat/Low Chol/High Fiber/2 gm Na; 1500 ml  ADULT ORAL NUTRITION SUPPLEMENT; Breakfast, Lunch; Standard 4 oz Oral Supplement      Disposition: home  Discharged Condition: Stable  Follow Up:   Himanshu Amador MD  [phone]  [address] [facility] [address] [place]  [phone]    Schedule an appointment as soon as possible for a visit      Lyla Lan MD  [address]  [phone]    Schedule an appointment as soon as possible for a visit in 2 week(s)          Code status:  Full Code         Total time spent on discharge, finalizing medications, referrals and arranging outpatient follow up was more than 45 minutes      Thank you Dr. Himanshu Amador MD for the opportunity to be involved in this patients care.

## 2022-11-10 NOTE — PROGRESS NOTES
Occupational Therapy  Facility/Department: [place] [address] [facility]  Occupational Therapy Treatment    Name: Melinda Gregg  : 1935  MRN: [id number]  Date of Service: 11/10/2022    Discharge Recommendations:   Melinda Gregg scored a 20/24 on the AM-PAC ADL Inpatient form. Current research shows that an AM-PAC score of 18 or greater is typically associated with a discharge to the patient's home setting. Based on the patient's AM-PAC score, and their current ADL deficits, it is recommended that the patient have 2-3 sessions per week of Occupational Therapy at d/c to increase the patient's independence. At this time, this patient demonstrates the endurance and safety to discharge  home with home services and a follow up treatment frequency of 2-3x/wk. Please see assessment section for further patient specific details. If patient discharges prior to next session this note will serve as a discharge summary. Please see below for the latest assessment towards goals. OT Equipment Recommendations  Equipment Needed: No       Patient Diagnosis(es): The primary encounter diagnosis was COVID-19 virus infection. Diagnoses of Bilateral pleural effusion and Acute combined systolic and diastolic congestive heart failure ([facility] [address] were also pertinent to this visit. Past Medical History:  has a past medical history of Arthritis, Balance disorder, CAD (coronary artery disease), Chest pain, Dental disease, Dizziness, Dyslipidemia, Fatigue, Hard of hearing, History of prostate cancer, Hypertension, Hyperthyroidism, Impaired memory, SOB (shortness of breath), and Vitamin D deficiency. Past Surgical History:  has a past surgical history that includes Cardiac surgery. Treatment Diagnosis: Impaired ADL and functional mobility      Assessment   Performance deficits / Impairments: Decreased functional mobility ; Decreased ADL status; Decreased endurance;Decreased balance  Assessment: Pt appeared confused today and required cues for activities. Pt cont to req CG for mobility and ADL. Cont OT tx per plan of care. Treatment Diagnosis: Impaired ADL and functional mobility  Prognosis: Good  REQUIRES OT FOLLOW-UP: Yes  Activity Tolerance  Activity Tolerance: Patient Tolerated treatment well  Activity Tolerance Comments: Pt appeared confused        Plan   Occupational Therapy Plan  Times Per Week: 2-5  Current Treatment Recommendations: Strengthening, Balance training, Functional mobility training, Endurance training, Self-Care / ADL, Cognitive reorientation     Restrictions  Position Activity Restriction  Other position/activity restrictions: Up as toplerated    Subjective   General  Chart Reviewed: Yes  Patient assessed for rehabilitation services?: Yes  Additional Pertinent Hx: Pt admitted 11/4/22 with dizziness and shortness of breath. Family / Caregiver Present: No  Referring Practitioner: Dr. Nieves [id number]  Diagnosis: Shortness of breath  Subjective  Subjective: Pt in bed upon entry. Pt agreeable to activity. No c/o pain. Social/Functional History  Social/Functional History  Lives With: Spouse  Type of Home: House  Home Layout: Multi-level, Able to Live on Main level with bedroom/bathroom  Home Access:  (2 GEETHA)  Bathroom Shower/Tub: Tub/Shower unit  Bathroom Toilet: Handicap height  Home Equipment: Cane  Has the patient had two or more falls in the past year or any fall with injury in the past year?:  (Reports 2 falls in the last year)  ADL Assistance: Independent  Ambulation Assistance: Independent (reports was using cane recently but was thinking of starting to use walker)  Transfer Assistance: Independent  Type of Occupation: worked for [employer]  Additional Comments: reports wife had a stroke - daughter and son assist them. Above info per pt report - question reliability.   History mildly difficult to obtain 2/2 [facility]       Objective         Safety Devices  Type of Devices: Left in chair;Call light within reach;Chair alarm in place;Nurse notified     Toilet Transfers  Toilet - Technique: Ambulating  Equipment Used: Standard toilet (grab bar)  Toilet Transfer: Contact guard assistance (+cues)  Toilet Transfers Comments: Pt walked to/from bathroom with CG     ADL  Grooming: Contact guard assistance (+cues to wash hands, standing at sink)  Toileting: Contact guard assistance (+cues)           Transfers  Stand Step Transfers: Contact guard assistance (+cues)  Sit to stand: Contact guard assistance (+cues)  Stand to sit: Contact guard assistance (+cues)     Cognition  Overall Cognitive Status: Exceptions  Arousal/Alertness: Appropriate responses to stimuli  Following Commands:  Follows one step commands consistently  Memory: Decreased short term memory  Insights: Decreased awareness of deficits                  Education Given To: Patient  Education Provided: Role of Therapy;Plan of Care;Transfer Training  Education Method: Verbal  Barriers to Learning: Cognition  Education Outcome: Continued education needed;Verbalized understanding                   AM-PAC Score        AM-PAC Inpatient Daily Activity Raw Score: 20 (11/07/22 1243)  AM-PAC Inpatient ADL T-Scale Score : 42.03 (11/07/22 1243)  ADL Inpatient CMS 0-100% Score: 38.32 (11/07/22 1243)  ADL Inpatient CMS G-Code Modifier : Zelda Schwartz (11/07/22 1243)    Goals                           No goals met  Short Term Goals  Time Frame for Short Term Goals: Discharge  Short Term Goal 1: Transfer to/from toilet with SBA  Short Term Goal 2: Stance with SBA x6 min while engaging in ADL/functional mobility  Short Term Goal 3: Don pants with SBA  Patient Goals   Patient goals : Go home       Therapy Time   Individual Concurrent Group Co-treatment   Time In 8320         Time Out 1408         Minutes 15         Timed Code Treatment Minutes: 15 Minutes   Total Treatment [address], OTR/L [id number]

## 2022-11-10 NOTE — PROGRESS NOTES
Physical Therapy  Facility/Department: [place] [address] [facility]  Physical Therapy Daily Treatment    Name: Geronimo Padilla  : 1935  MRN: [id number]  Date of Service: 11/10/2022    Discharge Recommendations: Geronimo Padilla scored a 18/24 on the AM-PAC short mobility form. Current research shows that an AM-PAC score of 18 or greater is typically associated with a discharge to the patient's home setting. Based on the patient's AM-PAC score and their current functional mobility deficits, it is recommended that the patient have 2-3 sessions per week of Physical Therapy at d/c to increase the patient's independence. At this time, this patient demonstrates the endurance and safety to discharge home with (home vs OP services) and a follow up treatment frequency of 2-3x/wk. Please see assessment section for further patient specific details. If patient discharges prior to next session this note will serve as a discharge summary. Please see below for the latest assessment towards goals. PT Equipment Recommendations  Equipment Needed: Yes  Mobility Devices: Brandt Ulrich: Rolling  Other: If he does not have one at home. Patient Diagnosis(es): The primary encounter diagnosis was COVID-19 virus infection. Diagnoses of Bilateral pleural effusion and Acute combined systolic and diastolic congestive heart failure ([address] were also pertinent to this visit. Past Medical History:  has a past medical history of Arthritis, Balance disorder, CAD (coronary artery disease), Chest pain, Dental disease, Dizziness, Dyslipidemia, Fatigue, Hard of hearing, History of prostate cancer, Hypertension, Hyperthyroidism, Impaired memory, SOB (shortness of breath), and Vitamin D deficiency. Past Surgical History:  has a past surgical history that includes Cardiac surgery. Assessment   Body Structures, Functions, Activity Limitations Requiring Skilled Therapeutic Intervention: Decreased functional mobility ; Decreased safe awareness;Decreased cognition;Decreased balance;Decreased endurance  Assessment: Pt seen by therapy for a quick session to the restroom, due to patients bed alarm going off. Pt able to transfer CGA from the EOB and needing Min A for ambulation with no AD. Pt was able to sit and use the restroom for a few minutes but needed mutliple verbal cues for hand washing and completeing task. Pt then ambulated to the chair Min A with reaching for hand held assist. Pt continues to be at a risk for falls if up alone. IF patient is to return home 24 hr hand held direct assistance is needed. Will continue to follow. Treatment Diagnosis: impaired functional mobility 2/2 decreased balance  Decision Making: Medium Complexity  Requires PT Follow-Up: Yes  Activity Tolerance  Activity Tolerance: Patient tolerated treatment well     Plan   Physcial Therapy Plan  General Plan:  (2-5)  Current Treatment Recommendations: Strengthening, Balance training, Functional mobility training, Gait training, Stair training, Patient/Caregiver education & training, Safety education & training, Endurance training  Safety Devices  Type of Devices: Left in chair, Call light within reach, Chair alarm in place, Nurse notified, All fall risk precautions in place     Restrictions  Position Activity Restriction  Other position/activity restrictions: Up as toplerated     Subjective   General  Chart Reviewed: Yes  Patient assessed for rehabilitation services?: Yes  Additional Pertinent Hx: Pt is an 82 y.o. male adm 11/4 with SOB. Pt presented to the ED with his son states over the last few days he has not been himself. He has been less active and occasionally \"talking out of his head\". CXR:Small bilateral pleural effusions and bibasilar airspace disease, greater on the left. Consider pneumonia versus atelectasis.    PMH:hypertension, dyslipidemia, coronary artery disease post remote CABG and dementia  Subjective  Subjective: Pt found standing in room with bed alarm going off. Therapy to respond. Pt requesting to use the restroom. Social/Functional History  Social/Functional History  Lives With: Spouse  Type of Home: House  Home Layout: Multi-level, Able to Live on Main level with bedroom/bathroom  Home Access:  (2 GEETHA)  Bathroom Shower/Tub: Tub/Shower unit  Bathroom Toilet: Handicap height  Home Equipment: Cane  Has the patient had two or more falls in the past year or any fall with injury in the past year?:  (Reports 2 falls in the last year)  ADL Assistance: Independent  Ambulation Assistance: Independent (reports was using cane recently but was thinking of starting to use walker)  Transfer Assistance: Independent  Type of Occupation: worked for [employer]  Additional Comments: reports wife had a stroke - daughter and son assist them. Above info per pt report - question reliability.   History mildly difficult to obtain 2/2 [facility]  Vision/Hearing       Cognition         Objective   Heart Rate: 71  Heart Rate Source: Monitor  BP: 107/66  BP Location: Right upper arm  BP Method: Automatic  Patient Position: Semi fowlers  MAP (Calculated): 80  Resp: 18  SpO2: 98 %  O2 Device: None (Room air)                             Bed mobility  Supine to Sit: Stand by assistance  Transfers  Sit to Stand: Contact guard assistance  Stand to Sit: Contact guard assistance  Ambulation  Surface: Level tile  Device: No Device  Assistance: Minimal assistance  Quality of Gait: unsteady, slightly LOB with turns, right lean, decreased bilat step length/height, reaching for hand held assist  Gait Deviations: Slow Mamie  Distance: 10ftx2 to and from the restroom     Balance  Sitting - Static: Good  Sitting - Dynamic: Good  Standing - Static: Fair           OutComes Score      AM-PAC Score  AM-PAC Inpatient Mobility Raw Score : 18 (11/10/22 1521)  AM-PAC Inpatient T-Scale Score : 43.63 (11/10/22 1521)  Mobility Inpatient CMS 0-100% Score: 46.58 (11/10/22 1521)  Mobility Inpatient CMS G-Code Modifier : CK (11/10/22 1521)          Tinneti Score       Goals  Short Term Goals  Time Frame for Short Term Goals: By discharge- ongoing  Short Term Goal 1: Sup to sit supervision with bed flat and without rail  Short Term Goal 2: Pt will transfer sit to stand supervision  Short Term Goal 3: Pt will amb >100' with LRAD supervision  Short Term Goal 4: Pt will up/down 2 steps with LRAD SBA       Education  Patient Education  Education Given To: Patient  Education Provided: Role of Therapy;Plan of Care  Education Method: Verbal  Barriers to Learning: None  Education Outcome: Verbalized understanding;Continued education needed      Therapy Time   Individual Concurrent Group Co-treatment   Time In 1349         Time Out 1406         Minutes 17          Timed Code Treatment Minutes:   17    Total Treatment Minutes:  Julia Lopez 36, PT

## 2022-11-10 NOTE — CARE COORDINATION
Case Management Assessment            Discharge Note                    Date / Time of Note: 11/10/2022 1:39 PM                  Discharge Note Completed by: Zakia Santos    Patient Name: Susannah Hart   YOB: 1935  Diagnosis: Shortness of breath [R06.02]  Bilateral pleural effusion [J90]  COVID-19 virus infection [U07.1]   Date / Time: 11/4/2022 10:03 PM    Current PCP: Ginette Lambert MD  Clinic patient: No    Hospitalization in the last 30 days: Yes    Advance Directives:  Code Status: Full Code  [place] DNR form completed and on chart: Not Indicated    Financial:  Payor: Maddie Maravilla / Plan: Sylwia Peña ESSENTIAL/PLUS / Product Type: *No Product type* /      Pharmacy:    [facility] [address] [place] [address]  Phone: [phone] Fax: [phone]      Assistance purchasing medications?:    Assistance provided by Case Management: None at this time    Does patient want to participate in local refill/ meds to beds program?:      Meds To Beds General Rules:  1. Can ONLY be done Monday- Friday between 8:30am-5pm  2. Prescription(s) must be in pharmacy by 3pm to be filled same day  3. Copy of patient's insurance/ prescription drug card and patient face sheet must be sent along with the prescription(s)  4. Cost of Rx cannot be added to hospital bill. If financial assistance is needed, please contact unit  or ;  or  CANNOT provide pharmacy voucher for patients co-pays  5.  Patients can then  the prescription on their way out of the hospital at discharge, or pharmacy can deliver to the bedside if staff is available. (payment due at time of pick-up or delivery - cash, check, or card accepted)     Able to afford home medications/ co-pay costs: Yes    ADLS:  Current PT AM-PAC Score: 18 /24  Current OT AM-PAC Score: 20 /24      DISCHARGE Disposition: Home with Home Health Care: [facility]     LOC at discharge: Not Applicable  FLORES Completed: Not Indicated    Notification completed in HENS/PAS?:  Not Applicable    IMM Completed:   Yes, Case management has presented and reviewed IMM letter #2 to the patient and/or family/ POA. Patient and/or family/POA verbalized understanding of their medicare rights and appeal process if needed. Patient and/or family/POA has signed, initialed and placed today's date (11/10/22) and time ([phone]) on IMM letter #2 on the the appropriate lines. Patient and/or family/POA, copy of letter offered and they are aware that this original copy of IMM letter #2 is available prior to discharge from the paper chart on the unit. Electronic documentation has been entered into epic for IMM letter #2 and original paper copy has been added to the paper chart at the nurses station. Transportation:  Transportation PLAN for discharge: family   Mode of Transport: Private Car  Reason for medical transport: Not Applicable  Name of [address] [place] [address], [address]: Not Applicable  Time of Transport:     Transport form completed: Not Indicated    Home Care:  [address] ordered at discharge: Yes  [address]: [facility] [address]  Orders faxed: Yes    Durable Medical Equipment:  DME Provider:   Equipment obtained during hospitalization:     Home Oxygen and Respiratory Equipment:  Oxygen needed at discharge?: No  [address]: Not Applicable  Portable tank available for discharge?: Not Indicated    Dialysis:  Dialysis patient: No    Dialysis Center:  Not Applicable    Hospice Services:  Location: Not Applicable  Agency: Not Applicable    Consents signed: Not Indicated    [facility]errals [facility] at [facility] for outpatient continued care:  Not Applicable    Additional CM Notes: CM spoke with son on phone concerning IMM, verbal signature received, although son expressed concern about pt's discharge and had questions concerning labs/tests.  MD made aware and requested call to son asap. Pt will be discharging home with [facility], pt has no other CM needs prior to dc. The Plan for Transition of Care is related to the following treatment goals of Shortness of breath [R06.02]  Bilateral pleural effusion [J90]  COVID-19 virus infection [U07.1]    The Patient and/or patient representative Franky Reinoso and his family were provided with a choice of provider and agrees with the discharge plan Yes    Freedom of choice list was provided with basic dialogue that supports the patient's individualized plan of care/goals and shares the quality data associated with the providers.  Yes    Care Transitions patient: No    Kenziecathryn Polo  [facility]  Case Management Department  Ph: [phone]  Fax:

## 2022-11-10 NOTE — PLAN OF CARE
Problem: Skin/Tissue Integrity  Goal: Absence of new skin breakdown  Description: 1. Monitor for areas of redness and/or skin breakdown  2. Assess vascular access sites hourly  3. Every 4-6 hours minimum:  Change oxygen saturation probe site  4. Every 4-6 hours:  If on nasal continuous positive airway pressure, respiratory therapy assess nares and determine need for appliance change or resting period. Outcome: Progressing  Note: Assessed skin. Pericare done. On condom cath to avoid incontinence. Attended needs. Assisted on mobility. Problem: ABCDS Injury Assessment  Goal: Absence of physical injury  Outcome: Progressing  Flowsheets (Taken 11/10/2022 0024)  Absence of Physical Injury: Implement safety measures based on patient assessment     Problem: Safety - Adult  Goal: Free from fall injury  11/10/2022 0024 by Jabier Bonilla RN  Note: Pt is a Fall Risk. See Rolan Gallop Fall Risk Score. Pt bed in low position and side rails up. Call light and belongings in reach. Pt encouraged to call for assistance. Will continue with hourly rounds for PO intake, pain needs, toileting, and repositioning as needed. Bed/chair alarm on. Problem: Respiratory - Adult  Goal: Achieves optimal ventilation and oxygenation  11/10/2022 0024 by Jabier Bonilla RN  Outcome: Progressing  Flowsheets (Taken 11/10/2022 0024)  Achieves optimal ventilation and oxygenation:   Assess for changes in respiratory status   Assess for changes in mentation and behavior  Note: On room air.       Problem: Cardiovascular - Adult  Goal: Maintains optimal cardiac output and hemodynamic stability  11/10/2022 0024 by Jabier Bonilla RN  Outcome: Progressing  Flowsheets (Taken 11/10/2022 0024)  Maintains optimal cardiac output and hemodynamic stability:   Monitor blood pressure and heart rate   Monitor urine output and notify Licensed Independent Practitioner for values outside of normal range     Problem: Cardiovascular - Adult  Goal: Absence of cardiac dysrhythmias or at baseline  Flowsheets (Taken 11/10/2022 0024)  Absence of cardiac dysrhythmias or at baseline:   Monitor cardiac rate and rhythm   Assess for signs of decreased cardiac output

## 2022-11-10 NOTE — PROGRESS NOTES
Pt to be fitted w/ life vest prior to expected d/c tomorrow. Updated family. All questions asked and answered.

## 2022-11-11 VITALS
SYSTOLIC BLOOD PRESSURE: 121 MMHG | WEIGHT: 130.51 LBS | HEIGHT: 74 IN | OXYGEN SATURATION: 98 % | TEMPERATURE: 97.6 F | BODY MASS INDEX: 16.75 KG/M2 | HEART RATE: 69 BPM | RESPIRATION RATE: 16 BRPM | DIASTOLIC BLOOD PRESSURE: 70 MMHG

## 2022-11-11 PROCEDURE — 6370000000 HC RX 637 (ALT 250 FOR IP): Performed by: HOSPITALIST

## 2022-11-11 PROCEDURE — 97535 SELF CARE MNGMENT TRAINING: CPT

## 2022-11-11 PROCEDURE — 2580000003 HC RX 258: Performed by: INTERNAL MEDICINE

## 2022-11-11 PROCEDURE — 6370000000 HC RX 637 (ALT 250 FOR IP): Performed by: NURSE PRACTITIONER

## 2022-11-11 PROCEDURE — 6360000002 HC RX W HCPCS: Performed by: INTERNAL MEDICINE

## 2022-11-11 PROCEDURE — 6370000000 HC RX 637 (ALT 250 FOR IP): Performed by: INTERNAL MEDICINE

## 2022-11-11 PROCEDURE — 97530 THERAPEUTIC ACTIVITIES: CPT

## 2022-11-11 PROCEDURE — 99233 SBSQ HOSP IP/OBS HIGH 50: CPT | Performed by: NURSE PRACTITIONER

## 2022-11-11 RX ADMIN — METOPROLOL SUCCINATE 25 MG: 25 TABLET, FILM COATED, EXTENDED RELEASE ORAL at 09:11

## 2022-11-11 RX ADMIN — OXYBUTYNIN CHLORIDE 5 MG: 5 TABLET, EXTENDED RELEASE ORAL at 09:11

## 2022-11-11 RX ADMIN — SPIRONOLACTONE 25 MG: 25 TABLET, FILM COATED ORAL at 09:11

## 2022-11-11 RX ADMIN — FUROSEMIDE 40 MG: 40 TABLET ORAL at 09:11

## 2022-11-11 RX ADMIN — ASPIRIN 81 MG 81 MG: 81 TABLET ORAL at 09:11

## 2022-11-11 RX ADMIN — ATORVASTATIN CALCIUM 40 MG: 40 TABLET, FILM COATED ORAL at 09:11

## 2022-11-11 RX ADMIN — ENOXAPARIN SODIUM 40 MG: 100 INJECTION SUBCUTANEOUS at 09:12

## 2022-11-11 RX ADMIN — TAMSULOSIN HYDROCHLORIDE 0.4 MG: 0.4 CAPSULE ORAL at 09:11

## 2022-11-11 RX ADMIN — SACUBITRIL AND VALSARTAN 1 TABLET: 24; 26 TABLET, FILM COATED ORAL at 09:10

## 2022-11-11 RX ADMIN — SODIUM CHLORIDE, PRESERVATIVE FREE 10 ML: 5 INJECTION INTRAVENOUS at 09:12

## 2022-11-11 RX ADMIN — POTASSIUM CHLORIDE 20 MEQ: 1500 TABLET, EXTENDED RELEASE ORAL at 09:11

## 2022-11-11 NOTE — PLAN OF CARE
Problem: Discharge Planning  Goal: Discharge to home or other facility with appropriate resources  11/11/2022 0034 by Brandt Sanches RN  Outcome: Progressing  Flowsheets (Taken 11/11/2022 0034)  Discharge to home or other facility with appropriate resources: Identify barriers to discharge with patient and caregiver  Note: For fitting of life vest terrance prior discharge to home with [facility]      Problem: ABCDS Injury Assessment  Goal: Absence of physical injury  Outcome: Progressing     Problem: Safety - Adult  Goal: Free from fall injury  11/11/2022 0034 by Brandt Sanches RN  Outcome: Progressing  Note: Pt is a Fall Risk. See Tai Bears Fall Risk Score. Pt bed in low position and side rails up. Call light and belongings in reach. Pt encouraged to call for assistance. Will continue with hourly rounds for PO intake, pain needs, toileting, and repositioning as needed. Bed alarm on. Video monitoring on.       Problem: Cardiovascular - Adult  Goal: Maintains optimal cardiac output and hemodynamic stability  Outcome: Progressing  Flowsheets (Taken 11/11/2022 0034)  Maintains optimal cardiac output and hemodynamic stability:   Monitor blood pressure and heart rate   Monitor urine output and notify Licensed Independent Practitioner for values outside of normal range   Assess for signs of decreased cardiac output     Problem: Cardiovascular - Adult  Goal: Absence of cardiac dysrhythmias or at baseline  Flowsheets (Taken 11/11/2022 0034)  Absence of cardiac dysrhythmias or at baseline:   Monitor cardiac rate and rhythm   Assess for signs of decreased cardiac output

## 2022-11-11 NOTE — DISCHARGE SUMMARY
Hospital Discharge Summary    Patient's PCP: Nathen Tony MD  Admit Date: 11/4/2022   Discharge Date: 11/11/2022    Admitting Physician: Dr. Junito Live MD  Discharge Physician: Dr. Estefany Zaragoza MD   Consults: cardiology    Brief HPI:   82-year-old male with CAD s/p CABG, HTN,BPH, dementia   -Admitted sec to dizziness, shortness of breath.    + short of breath over the last 3 to 4 weeks. + developed worsening lower extremity edema       Lab work showed elevated proBNP of 8000. -CXR --->bilateral pleural effusions. - was admitted to the hospital for further work-up and management    Brief hospital course:     New onset systolic heart failure  EF 15-20%,mod AS  -treated with IV Lasix, fluid restriction  C/w BB, transitioned lisinopril to entresto on dc,started on aldactone  -SGLT2,imdur/hydralazine outpatient  -Life vest ordered prior to discharge  -Ischemic work-up per cardiology outpatient     COVID-19 positive--  No O2 requirement  -continue to monitor     Coronary artery disease s/p CABG  - Continue aspirin, statin,BB     Hypertension  - Continue metoprolol,lisinopril  -SGLT2,MRA to be added    BPH  - Continue Flomax     Hypokalemia  -Repleted    Cognitive impairment--likely dementia--follow-up with PCP    Invasive procedures:      Discharge Diagnoses:   Principal Problem:    Shortness of breath  Active Problems:    Acute congestive heart failure ([facility] [address]    COVID-19 virus infection  Resolved Problems:    * No resolved hospital problems. *      Physical Exam: /70   Pulse 69   Temp 97.6 °F (36.4 °C) (Oral)   Resp 16   Ht 6' 2\" (1.88 m)   Wt 130 lb 8.2 oz (59.2 kg)   SpO2 98%   BMI 16.76 kg/m²   Gen/overall appearance: Not in acute distress. Alert. Head: Normocephalic, atraumatic  Eyes: EOMI, good acuity  ENT:- Oral mucosa moist  Neck: No JVD, thyromegaly  CVS: Nml S1S2, no MRG, RRR  Pulm: Clear bilaterally.  No crackles/wheezes  Gastrointestinal: Soft, NT/ND, +BS  Musculoskeletal: No edema. Warm  Neuro: No focal deficit. Moves extremity spontaneously. Psychiatry: Appropriate affect. Not agitated. Skin: Warm, dry with normal turgor. No rash        Significant Diagnostic Studies:    See above        Treatments: As above. Discharge Medications:     Medication List        START taking these medications      aspirin 81 MG chewable tablet  Take 1 tablet by mouth daily     sacubitril-valsartan 24-26 MG per tablet  Commonly known as: ENTRESTO  Take 1 tablet by mouth 2 times daily     spironolactone 25 MG tablet  Commonly known as: ALDACTONE  Take 1 tablet by mouth daily            CHANGE how you take these medications      furosemide 40 MG tablet  Commonly known as: LASIX  Take 1 tablet by mouth daily  What changed:   medication strength  how much to take  when to take this  reasons to take this            CONTINUE taking these medications      atorvastatin 40 MG tablet  Commonly known as: LIPITOR  Take 1 tablet by mouth daily for cholesterol.      metoprolol succinate 25 MG extended release tablet  Commonly known as: TOPROL XL  TAKE 1 TABLET BY MOUTH DAILY FOR BLOOD PRESSURE AND HEART     oxybutynin 5 MG extended release tablet  Commonly known as: Ditropan XL  Take 1 tablet by mouth daily     tamsulosin 0.4 MG capsule  Commonly known as: FLOMAX            STOP taking these medications      donepezil 10 MG tablet  Commonly known as: ARICEPT     lisinopril 10 MG tablet  Commonly known as: PRINIVIL;ZESTRIL     trospium 60 MG Cp24 extended release capsule  Commonly known as: SANCTURA               Where to Get Your Medications        These medications were sent to [address] [place] [address] [place] [address]      Phone: [phone]   aspirin 81 MG chewable tablet  furosemide 40 MG tablet  sacubitril-valsartan 24-26 MG per tablet  spironolactone 25 MG tablet         Activity: activity as tolerated  Diet: ADULT DIET; Regular; Low Fat/Low Chol/High Fiber/2 gm Na; 1500 ml  ADULT ORAL NUTRITION SUPPLEMENT; Breakfast, Lunch; Standard 4 oz Oral Supplement      Disposition: home  Discharged Condition: Stable  Follow Up:   Nathen Tony MD  [phone]  [address]  [phone]    Schedule an appointment as soon as possible for a visit      [address]  [phone]            Code status:  Full Code         Total time spent on discharge, finalizing medications, referrals and arranging outpatient follow up was more than 45 minutes      Thank you Dr. Nahten Tony MD for the opportunity to be involved in this patients care.

## 2022-11-11 NOTE — PROGRESS NOTES
[facility]   Cardiology  Note   Dr Taylor Peoples MD, Harry Jenkins RN, FNP APRN CVNP  Date: 11/11/2022  Admit Date: 11/4/2022       CC/cardiology consult: Dizziness, MS changes  PMH: CAD/CABG /  2015 neg stress test / HTN balance disorder / dementia     Interval Hx /  Subjective: discussed pt with family Dr Paulino Chakraborty  RN and HF nurse :  meds /  discharge planning   resting in bed, VSS / in NSR   On 11/8/222 had NSVT  / Increase Toprol 25mg daily to BID   In Covid isolation      Scheduled Meds:   sacubitril-valsartan  1 tablet Oral BID    spironolactone  25 mg Oral Daily    furosemide  40 mg Oral Daily    metoprolol succinate  25 mg Oral BID    potassium chloride  20 mEq Oral Daily with breakfast    atorvastatin  40 mg Oral Daily    oxybutynin  5 mg Oral Daily    tamsulosin  0.4 mg Oral Daily    sodium chloride flush  5-40 mL IntraVENous 2 times per day    enoxaparin  40 mg SubCUTAneous Daily    aspirin  81 mg Oral Daily     Vitals:    11/11/22 0800   BP: 125/75   Pulse: 65   Resp: 16   Temp: 97.5 °F (36.4 °C)   SpO2: 100%      In: 415 [P.O.:410; I.V.:5]  Out: 1100    Wt Readings from Last 3 Encounters:   11/11/22 130 lb 8.2 oz (59.2 kg)   10/26/22 154 lb 6.4 oz (70 kg)   10/05/22 156 lb 12.8 oz (71.1 kg)       Intake/Output Summary (Last 24 hours) at 11/11/2022 1006  Last data filed at 11/11/2022 0315  Gross per 24 hour   Intake 415 ml   Output 1100 ml   Net -685 ml     Patient Active Problem List    Diagnosis Date Noted    COVID-19 virus infection 11/07/2022    Shortness of breath 11/05/2022    Acute congestive heart failure ([facility] [address] 11/05/2022    Tremor of right hand 06/13/2021    Balance disorder 06/13/2021    Impaired memory 06/13/2021    Primary osteoarthritis of left knee 04/21/2016    Contusion of left knee 04/21/2016    Prostate cancer ([facility] [address] 07/01/2015    Elevated PSA 01/18/2013    BPH (benign prostatic hyperplasia) 12/15/2011    Hypertension 09/18/2011    Hyperlipidemia 09/18/2011    CAD (coronary artery disease) 09/18/2011        Assessment     new onset CHF / HFrEF   continue lasix   fluid restriction low salt diet /sCr wnl      ICM   11/2022 TTE  / EF 15-20%  moderate AS / m/m AR   Left ventricular cavity size is dilated. Normal left ventricular wall thickness. Overall left ventricular systolic function appears severely  reduced with an ejection fraction of 15-20%. There is severe diffuse hypokinesis. Diastolic filling parameters suggest grade I diastolic  dysfunction. Mild mitral regurgitation is present. The aortic valve leaflets are slightly thickened /calcified. Mild-to-moderate aortic  regurgitation is present. Moderate aortic stenosis with a peak velocity of 3.05m/s and a mean pressure gradient of 23mmHg. Mild  tricuspid regurgitation. Estimated pulmonary artery systolic pressure is at 39 mmHg assuming a right atrial pressure of 15 mmHg. The right ventricle appears normal in size but hypokinetic. TAPSE measures 1.11cm and the RVS velocity measures 5.46 cm/s. There  is a left pleural effusion. IVC size is dilated (>2.1 cm) and collapses < 50% with respiration consistent with elevated RA pressure (15  mmHg).     COVID-19 positive  Managed per IM     CAD    Continue aspirin, statin     HTN   Controlled      BPH  Continue Flomax     Hypokalemia  Replace as needed     NSVT   Increase  Toprol 25 mg daily to BID     Plan   discussed pt with family, Dr Mo Jones nurse:  meds /  discharge planning     Thank you for allowing to us to participate in the care of [address] Gaytan APRN-CNP-CVNP    [facility]

## 2022-11-11 NOTE — PROGRESS NOTES
Occupational Therapy  Facility/Department: [place] [address] [facility]  Daily Treatment    Name: Agustín Roberts  : 1935  MRN: [id number]  Date of Service: 2022    Discharge Recommendations:  Agustín Roberts scored a 17/24 on the AM-PAC ADL Inpatient form. Current research shows that an AM-PAC score of 17 or less is typically not associated with a discharge to the patient's home setting. Based on the patient's AM-PAC score and their current ADL deficits, it is recommended that the patient have 3-5 sessions per week of Occupational Therapy at d/c to increase the patient's independence. Please see assessment section for further patient specific details. If patient discharges prior to next session this note will serve as a discharge summary. Please see below for the latest assessment towards goals. OT Equipment Recommendations  Equipment Needed: Yes  Other: shower chair     Patient Diagnosis(es): The primary encounter diagnosis was COVID-19 virus infection. Diagnoses of Bilateral pleural effusion and Acute combined systolic and diastolic congestive heart failure ([address] were also pertinent to this visit. Past Medical History:  has a past medical history of Arthritis, Balance disorder, CAD (coronary artery disease), Chest pain, Dental disease, Dizziness, Dyslipidemia, Fatigue, Hard of hearing, History of prostate cancer, Hypertension, Hyperthyroidism, Impaired memory, SOB (shortness of breath), and Vitamin D deficiency. Past Surgical History:  has a past surgical history that includes Cardiac surgery. Treatment Diagnosis: Impaired ADL and functional mobility    Assessment   Performance deficits / Impairments: Decreased functional mobility ; Decreased ADL status; Decreased endurance;Decreased balance;Decreased strength;Decreased cognition;Decreased safe awareness;Decreased posture  After treatment, pt with COVID 19 is found to be presenting with the above mentioned deficits.  Pt would benefit from continued skilled occupational therapy to address these deficits, increasing safety and independence with ADL and functional mobility. Pt currently has decreased insight into deficits and errors, requires max cues for proper technique with RW, and needs CGA for all standing level activity with RW. Rec close 24hr A, HHOT if pt declines SNF placement. Will continue to assess for discharge needs. Treatment Diagnosis: Impaired ADL and functional mobility  Prognosis: Good  REQUIRES OT FOLLOW-UP: Yes  Activity Tolerance  Activity Tolerance: Patient Tolerated treatment well;Treatment limited secondary to decreased cognition        Plan   Occupational Therapy Plan  Times Per Week: 2-5  Current Treatment Recommendations: Strengthening, Balance training, Functional mobility training, Endurance training, Self-Care / ADL, Cognitive reorientation, Patient/Caregiver education & training, Safety education & training     Restrictions  Position Activity Restriction  Other position/activity restrictions: Up as toplerated    Subjective   General  Chart Reviewed: Yes  Patient assessed for rehabilitation services?: Yes  Additional Pertinent Hx: Pt admitted 11/4/22 with dizziness and shortness of breath. Family / Caregiver Present: No  Referring Practitioner: Dr. Ernestina Jaimes  Diagnosis: Shortness of breath 2/2 COVID 19  Subjective  Subjective: Pt in bed upon entry. Pt agreeable to activity. No c/o pain.      Social/Functional History  Social/Functional History  Lives With: Spouse  Type of Home: House  Home Layout: Multi-level, Able to Live on Main level with bedroom/bathroom  Home Access:  (2 GEETHA)  Bathroom Shower/Tub: Tub/Shower unit  Bathroom Toilet: Handicap height  Home Equipment: Cane  Has the patient had two or more falls in the past year or any fall with injury in the past year?:  (Reports 2 falls in the last year)  ADL Assistance: Independent  Ambulation Assistance: Independent (reports was using cane recently but was thinking of starting to use walker)  Transfer Assistance: Independent  Type of Occupation: worked for [employer]  Additional Comments: reports wife had a stroke - daughter and son assist them. Above info per pt report - question reliability. History mildly difficult to obtain 2/2 [facility]       Objective                Safety Devices  Type of Devices: Left in chair;Call light within reach; Chair alarm in place;Nurse notified; All fall risk precautions in place;Gait belt             ADL  UE Dressing: Minimal assistance (to don overhead shirt seated)  LE Dressing: Contact guard assistance (to don pants seated and standing)  Toileting: Contact guard assistance;Setup (to urinate standing with RW)  Additional Comments: Pt refusing further ADL at this time. Increased time needed for all tasks. Bed mobility  Supine to Sit: Stand by assistance    Transfers  Sit to stand: Contact guard assistance  Stand to sit: Contact guard assistance  Transfer Comments: max VCs for safe use of RW and safe t/f technique       Cognition  Overall Cognitive Status: Exceptions  Arousal/Alertness: Delayed responses to stimuli  Following Commands: Follows one step commands with increased time; Follows one step commands with repetition  Attention Span: Attends with cues to redirect  Memory: Decreased short term memory  Safety Judgement: Decreased awareness of need for assistance;Decreased awareness of need for safety  Problem Solving: Assistance required to generate solutions;Assistance required to implement solutions;Assistance required to correct errors made;Assistance required to identify errors made;Decreased awareness of errors  Insights: Decreased awareness of deficits  Initiation: Requires cues for some  Sequencing: Requires cues for some  Orientation  Orientation Level: Oriented to person;Disoriented to time;Oriented to place; Disoriented to situation (Pt reoriented)                                      Functional mobility: Pt walked to/from bathroom with gait belt, RW, and CGA. Therapeutic exercise/activity:  Pt completed 1 set of the following to build strength and functional activity tolerance for ADL, IADL, and t/fs as well as improve balance reactions. CGA to SBA with light UE balance support on RW prn:   10 consecutive sit to stands from recliner  8 knee raises each leg    Education: Role of OT, safe t/f training, safe use of DME, awareness of deficits, discharge planning, ADL as therapeutic exercise, importance of OOB, cognitive orientation       AM-PAC Score        AM-[facility] Inpatient Daily Activity Raw Score: 17 (11/11/22 1538)  -PAC Inpatient ADL T-Scale Score : 37.26 (11/11/22 1538)  ADL Inpatient CMS 0-100% Score: 50.11 (11/11/22 1538)  ADL Inpatient CMS G-Code Modifier : CK (11/11/22 1538)       Goals  Short Term Goals  Time Frame for Short Term Goals: Discharge  Short Term Goal 1: Transfer to/from toilet with SBA  Short Term Goal 2: Stance with SBA x6 min while engaging in ADL/functional mobility  Short Term Goal 3: Don pants with SBA  Patient Goals   Patient goals : Go home       Therapy Time   Individual Concurrent Group Co-treatment   Time In 1501         Time Out 1532         Minutes 31         Timed Code Treatment Minutes: 31 Minutes       If patient is discharged prior to next treatment session, this note will serve as the discharge summary.   Yvonne Garibay OTR/L #[id number]

## 2022-11-11 NOTE — PLAN OF CARE
Problem: Discharge Planning  Goal: Discharge to home or other facility with appropriate resources  Outcome: Completed  Flowsheets (Taken 11/11/2022 0800)  Discharge to home or other facility with appropriate resources:   Identify barriers to discharge with patient and caregiver   Arrange for needed discharge resources and transportation as appropriate   Identify discharge learning needs (meds, wound care, etc)   Arrange for interpreters to assist at discharge as needed   Refer to discharge planning if patient needs post-hospital services based on physician order or complex needs related to functional status, cognitive ability or social support system     Problem: Skin/Tissue Integrity  Goal: Absence of new skin breakdown  Description: 1. Monitor for areas of redness and/or skin breakdown  2. Assess vascular access sites hourly  3. Every 4-6 hours minimum:  Change oxygen saturation probe site  4. Every 4-6 hours:  If on nasal continuous positive airway pressure, respiratory therapy assess nares and determine need for appliance change or resting period.   Outcome: Completed     Problem: ABCDS Injury Assessment  Goal: Absence of physical injury  Outcome: Completed     Problem: Safety - Adult  Goal: Free from fall injury  Outcome: Completed     Problem: Chronic Conditions and Co-morbidities  Goal: Patient's chronic conditions and co-morbidity symptoms are monitored and maintained or improved  Outcome: Completed  Flowsheets (Taken 11/11/2022 0800)  Care Plan - Patient's Chronic Conditions and Co-Morbidity Symptoms are Monitored and Maintained or Improved:   Monitor and assess patient's chronic conditions and comorbid symptoms for stability, deterioration, or improvement   Collaborate with multidisciplinary team to address chronic and comorbid conditions and prevent exacerbation or deterioration   Update acute care plan with appropriate goals if chronic or comorbid symptoms are exacerbated and prevent overall improvement and discharge     Problem: Respiratory - Adult  Goal: Achieves optimal ventilation and oxygenation  Outcome: Completed     Problem: Cardiovascular - Adult  Goal: Maintains optimal cardiac output and hemodynamic stability  Outcome: Completed  Flowsheets (Taken 11/11/2022 0800)  Maintains optimal cardiac output and hemodynamic stability:   Monitor blood pressure and heart rate   Monitor urine output and notify Licensed Independent Practitioner for values outside of normal range   Administer fluid and/or volume expanders as ordered   Assess for signs of decreased cardiac output   Administer vasoactive medications as ordered  Goal: Absence of cardiac dysrhythmias or at baseline  Outcome: Completed  Flowsheets (Taken 11/11/2022 0800)  Absence of cardiac dysrhythmias or at baseline:   Assess for signs of decreased cardiac output   Administer antiarrhythmia medication and electrolyte replacement as ordered   Monitor cardiac rate and rhythm     Problem: Metabolic/Fluid and Electrolytes - Adult  Goal: Electrolytes maintained within normal limits  Outcome: Completed  Flowsheets (Taken 11/11/2022 0800)  Electrolytes maintained within normal limits:   Monitor labs and assess patient for signs and symptoms of electrolyte imbalances   Administer electrolyte replacement as ordered   Monitor response to electrolyte replacements, including repeat lab results as appropriate   Fluid restriction as ordered   Instruct patient on fluid and nutrition restrictions as appropriate  Goal: Hemodynamic stability and optimal renal function maintained  Outcome: Completed  Flowsheets (Taken 11/11/2022 0800)  Hemodynamic stability and optimal renal function maintained:   Monitor labs and assess for signs and symptoms of volume excess or deficit   Monitor intake, output and patient weight   Monitor urine specific gravity, serum osmolarity and serum sodium as indicated or ordered   Encourage oral intake as appropriate   Monitor response to interventions for patient's volume status, including labs, urine output, blood pressure (other measures as available)   Instruct patient on fluid and nutrition restrictions as appropriate     Problem: Nutrition Deficit:  Goal: Optimize nutritional status  Outcome: Completed

## 2022-11-14 ENCOUNTER — CARE COORDINATION (OUTPATIENT)
Dept: CASE MANAGEMENT | Age: 87
End: 2022-11-14

## 2022-11-14 DIAGNOSIS — U07.1 COVID-19 VIRUS INFECTION: Primary | ICD-10-CM

## 2022-11-14 PROCEDURE — 1111F DSCHRG MED/CURRENT MED MERGE: CPT | Performed by: INTERNAL MEDICINE

## 2022-11-14 NOTE — CARE COORDINATION
Call to [facility]. Provided them with son Thomas's contact information, confirmed they do have start of care orders. CTN also LM on Thomas's VM with this CTN and acting CTN asking for f/u call. Rigoberto Carpenter RN, BSN  Care Coordinator  Cell [phone]  Email [email or web address]

## 2022-11-14 NOTE — CARE COORDINATION
[facility] Care Transitions Initial Follow Up Call    Call within 2 business days of discharge: Yes    Care Transition Nurse contacted the family by telephone to perform post hospital discharge assessment. Verified name and  with family as identifiers. Provided introduction to self, and explanation of the Care Transition Nurse role. Patient: Dyana Rinne Patient : 1935   MRN: <[id number]>  Reason for Admission: Covid  Discharge Date: 22 RARS: Readmission Risk Score: 10.3      Last Discharge [address]       Date Complaint Diagnosis Description Type Department Provider    22 Dizziness COVID-19 virus infection . .. ED to Hosp-Admission (Discharged) (ADMITTED) Itzel Stinson 4 U Darius Deshpande MD; Shandra Slater. .. Was this an external facility discharge? No Discharge Facility: [facility]    Challenges to be reviewed by the provider   Additional needs identified to be addressed with provider: No  none               Method of communication with provider: none. Son Russell Mills verified returned call and HUEY LEE called back. He and his sister (who lives with pt) monitor pt and take care of communications with his health care team. He will be at the house today at 3pm when therapy is there. We discussed nursing and the need for upcoming blood work that could possibly be completed by the [facility]. Pt is doing well, somewhat independent, SHAWN is definitely an obstacle. Pts wife, Max's mother is WC bound and pt does do a lot of her care until recently which is why sister moved in. CHF is newly diagnosed, this was reviewed, son will talk to cardio at appt Friday about the outpt CHF clinic. Care Transition Nurse reviewed discharge instructions with family who verbalized understanding. The family was given an opportunity to ask questions and does not have any further questions or concerns at this time. Were discharge instructions available to patient? Yes.  Reviewed appropriate site of care based on symptoms and resources available to patient including: PCP  Specialist. The family agrees to contact the PCP office for questions related to their healthcare. Advance Care Planning:   Does patient have an Advance Directive: decision maker updated. Advance Care Planning   Healthcare Decision Maker:    Primary Decision Maker: Chelsey Scott - [phone]    Secondary Decision Maker: Ottoniel Garcia - [phone]    Secondary Decision Maker: Lennox Alstrom - Мария - [phone]    Click here to complete Healthcare Decision Makers including selection of the Healthcare Decision Maker Relationship (ie \"Primary\"). Today we documented Decision Maker(s) consistent with Legal Next of Kin hierarchy. Medication reconciliation was performed with family, who verbalizes understanding of administration of home medications. Medications reviewed, 1111F entered: yes    Was patient discharged with a pulse oximeter? no    Non-face-to-face services provided:  Obtained and reviewed discharge summary and/or continuity of care documents  Communication with home health agencies or other community services the patient is currently using-[employer]    Offered patient enrollment in the Remote Patient Monitoring (RPM) program for in-home monitoring: Yes, but did not enroll at this time and son took the codes to call insurance and will discuss with sister .     Care Transitions 24 Hour Call    Schedule Follow Up Appointment with PCP: Completed  Do you have a copy of your discharge instructions?: Yes  Do you have all of your prescriptions and are they filled?: Yes (Comment: pt no able to answer)  Have you been contacted by a [facility] Pharmacist?: No  Have you scheduled your follow up appointment?: Yes  How are you going to get to your appointment?: Car - family or friend to transport  Do you feel like you have everything you need to keep you well at home?: Yes  Care Transitions Interventions   Home Care Waiver: Completed              Follow Up  Future Appointments   Date Time Provider Soren Mylesi   11/18/2022 10:30 AM Roberto Scanlon MD [facility]   11/23/2022  9:00 AM MD JEROME Mercado [facility] [place] - [facility]   12/2/2022  8:40 AM MD JEROME Mercado [facility] [place] - [phone] Nurse provided contact information. Plan for follow-up call in 3-5 days based on severity of symptoms and risk factors. Plan for next call: symptom management-related to Covid and CHF  self management-ADLs  follow-up appointment-anything to review?  medication management-any changes? Did [facility] draw the BMP?      [place] Malik RN

## 2022-11-14 NOTE — CARE COORDINATION
[facility] Care Transitions Initial Follow Up Call    Call within 2 business days of discharge: Yes    Care Transition Nurse contacted the patient by telephone to perform post hospital discharge assessment. Verified name and  with patient as identifiers. Provided introduction to self, and explanation of the Care Transition Nurse role. Patient: Stella Carrion Patient : 1935   MRN: <[id number]>  Reason for Admission: Covid  Discharge Date: 22 RARS: Readmission Risk Score: 10.3      Last Discharge [address]       Date Complaint Diagnosis Description Type Department Provider    22 Dizziness COVID-19 virus infection . .. ED to Hosp-Admission (Discharged) (ADMITTED) [address] 4 PCU Jarrod Myers MD; Beto Ortiz. .. Was this an external facility discharge? No Discharge Facility: [facility]    Challenges to be reviewed by the provider   Additional needs identified to be addressed with provider: Yes  Pt needs hospital f/u and call son to arrange. Method of communication with provider: chart routing. Call to pt for hospital f/u. Pt is a difficult historian. Repeatedly had to confirm who CTN was versus home health nurse. Pt states son would be a better contact. Denies trouble breathing, could not asses other information, pt could not understand questions,  states wife is sick too and would not be available to talk to. Care Transition Nurse reviewed discharge instructions with patient who verbalized understanding. The patient was given an opportunity to ask questions and does not have any further questions or concerns at this time. Were discharge instructions available to patient? Yes. Reviewed appropriate site of care based on symptoms and resources available to patient including: PCP. The patient agrees to contact the PCP office for questions related to their healthcare.      Advance Care Planning:   Does patient have an Advance Directive:  unable to complete due to pts inability to understand . Medication reconciliation was performed with  no one , who verbalizes understanding of administration of home medications. Medications reviewed, 1111F entered: no    Was patient discharged with a pulse oximeter? no    Non-face-to-face services provided:  Communication with home health agencies or other community services the patient is currently using-[employer]  Routed note to PCP for appt    Offered patient enrollment in the Remote Patient Monitoring (RPM) program for in-home monitoring:  would need to offer to family memeber . Care Transitions 24 Hour Call    Do you have a copy of your discharge instructions?: Yes  Do you have all of your prescriptions and are they filled?:  (Comment: pt no able to answer)  Have you been contacted by a [facility]?: No  Have you scheduled your follow up appointment?: No (Comment: routing note to PCP)  Do you feel like you have everything you need to keep you well at home?: Yes  Care Transitions Interventions   Home Care Waiver: Completed              Follow Up  Future Appointments   Date Time Provider Soren Rosa   11/18/2022 10:30 AM Jeimy Carroll MD [facility]   11/23/2022  9:00 AM [facility] [place] [facility]   12/2/2022  8:40 AM Juan Covarrubias MD [facility] Transition Nurse did not provide contact information. Plan for follow-up call in 1-2 days based on severity of symptoms and risk factors. Plan for next call: symptom management-related to admission  self management-ADLs  follow-up appointment-did they make with PCP  medication management-review with family member  Consider RPM if family can manage. Did home care reach son for start of care?     Laine Hansen, RICCO

## 2022-11-17 ENCOUNTER — CARE COORDINATION (OUTPATIENT)
Dept: CASE MANAGEMENT | Age: 87
End: 2022-11-17

## 2022-11-17 NOTE — CARE COORDINATION
Date/Time:  11/17/2022 2:01 PM  Attempted to reach patient by telephone. Call within 2 business days of discharge: Yes Left HIPPA compliant message requesting a return call. Will attempt to reach patient again. CTN contacted [facility], states patient has not yet been started due to not being able to reach Son.       Thank Alexi Ocampo RN  Care Transition Coordinator  Contact [facility]:[phone]

## 2022-11-18 ENCOUNTER — OFFICE VISIT (OUTPATIENT)
Dept: CARDIOLOGY CLINIC | Age: 87
End: 2022-11-18
Payer: MEDICARE

## 2022-11-18 VITALS
WEIGHT: 147 LBS | BODY MASS INDEX: 18.87 KG/M2 | HEART RATE: 70 BPM | DIASTOLIC BLOOD PRESSURE: 60 MMHG | HEIGHT: 74 IN | SYSTOLIC BLOOD PRESSURE: 106 MMHG

## 2022-11-18 DIAGNOSIS — I25.10 CORONARY ARTERY DISEASE INVOLVING NATIVE CORONARY ARTERY OF NATIVE HEART WITHOUT ANGINA PECTORIS: Primary | ICD-10-CM

## 2022-11-18 DIAGNOSIS — E78.5 HYPERLIPIDEMIA LDL GOAL <70: ICD-10-CM

## 2022-11-18 DIAGNOSIS — U07.1 COVID-19 VIRUS INFECTION: ICD-10-CM

## 2022-11-18 PROCEDURE — 3074F SYST BP LT 130 MM HG: CPT | Performed by: INTERNAL MEDICINE

## 2022-11-18 PROCEDURE — 3078F DIAST BP <80 MM HG: CPT | Performed by: INTERNAL MEDICINE

## 2022-11-18 PROCEDURE — 99214 OFFICE O/P EST MOD 30 MIN: CPT | Performed by: INTERNAL MEDICINE

## 2022-11-18 PROCEDURE — 1123F ACP DISCUSS/DSCN MKR DOCD: CPT | Performed by: INTERNAL MEDICINE

## 2022-11-18 NOTE — PROGRESS NOTES
[facility]   Dr Mark Borrero. Talat Roque MD, [facility] [facility]    Outpatient Follow Up Note    11/18/2022,11:01 AM  Subjective:   CHIEF COMPLAINT / HPI:  Follow Up secondary to CAD and post bypass and cardiomyopathy. Denver Peck is 82 y.o. male who presents today for a routine follow up. Was in hospital couple weeks ago for about a week. Apparently has developed significant ischemic cardiomyopathy. Has lost a lot of weight and was in hospital and did have significant diuresis with reduction of his peripheral edema. LifeVest was placed because his ejection fraction was 10 to 15%. Again denying chest pains and still trying to get out and walk and do his work as a lawn care person. While in hospital he was diagnosed as having COVID-pneumonia and is recuperating from that at this time. On examination today I find his hemodynamics are good blood pressure 108/60 pulse of 70. The lungs are clear. He is wearing a LifeVest.  The lungs are clear and the extremities do show edema to his mid calves. Apparently this is worse than since he went home. Apparently not being completely compliant with sodium and fluid restrictions. I spent 35 minutes plus of time discussing with the patient's family and his son who is with him. Also reviewing the records and trended did through what happened while he was in hospital 2 weeks ago. Additional time spent counseling the family.         Covid pneumonia 11/22  CHF ISCHEMIC CARDIOMYOPATHY EF 15-20%  CAD and CABG  TURP for BPH     Past Medical History:    Past Medical History:   Diagnosis Date    Arthritis     Balance disorder     CAD (coronary artery disease)     Chest pain     Dental disease     Dizziness     Dyslipidemia     Fatigue     Hard of hearing     History of prostate cancer     Hypertension     Hyperthyroidism     Impaired memory     SOB (shortness of breath)     Vitamin D deficiency      Past Surgical History  Past Surgical History: Procedure Laterality Date    CARDIAC SURGERY       Social History:       Social History     Tobacco Use   Smoking Status Former    Types: Cigarettes    Quit date: 1975    Years since quittin.9   Smokeless Tobacco Never     Current Medications:  Prior to Visit Medications    Medication Sig Taking? Authorizing Provider   furosemide (LASIX) 40 MG tablet Take 1 tablet by mouth daily Yes Frandy Gómez MD   spironolactone (ALDACTONE) 25 MG tablet Take 1 tablet by mouth daily Yes Frandy Gómez MD   aspirin 81 MG chewable tablet Take 1 tablet by mouth daily Yes Frandy Gómez MD   sacubitril-valsartan (ENTRESTO) 24-26 MG per tablet Take 1 tablet by mouth 2 times daily Yes Frandy Gómez MD   tamsulosin (FLOMAX) 0.4 MG capsule Take 0.4 mg by mouth daily Yes Historical Provider, MD   atorvastatin (LIPITOR) 40 MG tablet Take 1 tablet by mouth daily for cholesterol. Yes Elena Mae MD   metoprolol succinate (TOPROL XL) 25 MG extended release tablet TAKE 1 TABLET BY MOUTH DAILY FOR BLOOD PRESSURE AND HEART Yes Elena Mae MD   oxybutynin (DITROPAN XL) 5 MG extended release tablet Take 1 tablet by mouth daily Yes Elena Mae MD     Family History  Family History   Problem Relation Age of Onset    Heart Disease Mother     Heart Disease Father        Current Medications  Current Outpatient Medications   Medication Sig Dispense Refill    furosemide (LASIX) 40 MG tablet Take 1 tablet by mouth daily 60 tablet 3    spironolactone (ALDACTONE) 25 MG tablet Take 1 tablet by mouth daily 30 tablet 3    aspirin 81 MG chewable tablet Take 1 tablet by mouth daily 30 tablet 3    sacubitril-valsartan (ENTRESTO) 24-26 MG per tablet Take 1 tablet by mouth 2 times daily 60 tablet 3    tamsulosin (FLOMAX) 0.4 MG capsule Take 0.4 mg by mouth daily      atorvastatin (LIPITOR) 40 MG tablet Take 1 tablet by mouth daily for cholesterol.  90 tablet 3    metoprolol succinate (TOPROL XL) 25 MG extended release tablet TAKE 1 TABLET BY MOUTH DAILY FOR BLOOD PRESSURE AND HEART 90 tablet 3    oxybutynin (DITROPAN XL) 5 MG extended release tablet Take 1 tablet by mouth daily 30 tablet 3     No current facility-administered medications for this visit. REVIEW OF SYSTEMS:    CONSTITUTIONAL: No major weight gain or loss, fatigue, weakness, night sweats or fever. HEENT: No new vision difficulties or ringing in the ears. RESPIRATORY: No new SOB, PND, orthopnea or cough. CARDIOVASCULAR: See HPI  GI: No nausea, vomiting, diarrhea, constipation, abdominal pain or changes in bowel habits. : No urinary frequency, urgency, incontinence hematuria or dysuria. SKIN: No cyanosis or skin lesions. MUSCULOSKELETAL: No new muscle or joint pain. NEUROLOGICAL: No syncope or TIA-like symptoms. PSYCHIATRIC: No anxiety, pain, insomnia or depression    Objective:   PHYSICAL EXAM:        VITALS:    Wt Readings from Last 3 Encounters:   11/18/22 147 lb (66.7 kg)   11/11/22 130 lb 8.2 oz (59.2 kg)   10/26/22 154 lb 6.4 oz (70 kg)     BP Readings from Last 3 Encounters:   11/18/22 106/60   11/11/22 121/70   10/26/22 135/72     Pulse Readings from Last 3 Encounters:   11/18/22 70   11/11/22 69   10/26/22 97       CONSTITUTIONAL: Cooperative, no apparent distress, and appears well nourished / developed  NEUROLOGIC:  Awake and orientated to person, place and time. PSYCH: Calm affect. SKIN: Warm and dry. HEENT: Sclera non-icteric, normocephalic, neck supple, no elevation of JVP, normal carotid pulses with no bruits and thyroid normal size. LUNGS:  No increased work of breathing and clear to auscultation, no crackles or wheezing  CARDIOVASCULAR:  Regular rate and rhythm with no murmurs, gallops, rubs, or abnormal heart sounds, normal PMI. The apical impulses not displaced  Heart tones are crisp and normal  Cervical veins are not engorged  The carotid upstroke is normal in amplitude and contour without delay or bruit  JVP is not elevated  ABDOMEN:  Normal bowel sounds, non-distended and non-tender to palpation  EXT: Mild lower extremity edema, no calf tenderness. Pulses are present bilaterally. DATA:    Lab Results   Component Value Date    ALT 28 10/12/2022    AST 22 10/12/2022    ALKPHOS 90 10/12/2022    BILITOT 0.4 10/12/2022     Lab Results   Component Value Date    CREATININE 1.0 11/09/2022    BUN 21 (H) 11/09/2022     11/09/2022    K 3.9 11/09/2022     11/09/2022    CO2 32 11/09/2022     Lab Results   Component Value Date    TSH 0.96 12/15/2011     Lab Results   Component Value Date    WBC 4.0 11/06/2022    HGB 14.2 11/06/2022    HCT 43.4 11/06/2022    MCV 87.6 11/06/2022     11/06/2022     No components found for: CHLPL  Lab Results   Component Value Date    TRIG 71 10/12/2022    TRIG 47 04/08/2022    TRIG 43 05/14/2021     Lab Results   Component Value Date    HDL 57 10/12/2022    HDL 56 04/08/2022    HDL 58 05/14/2021     Lab Results   Component Value Date    LDLCALC 124 (H) 10/12/2022    LDLCALC 106 (H) 04/08/2022    LDLCALC 79 05/14/2021     Lab Results   Component Value Date    LABVLDL 14 10/12/2022    LABVLDL 9 04/08/2022    LABVLDL 9 05/14/2021     Radiology Review:  Pertinent images / reports were reviewed as a part of this visit and reveals the following:    Echo:   Summary 11/7/22   Left ventricular cavity size is dilated. Normal left ventricular wall   thickness. Overall left ventricular systolic function appears severely   reduced with an ejection fraction of 15-20%. There is severe diffuse   hypokinesis. Diastolic filling parameters suggest grade I diastolic   dysfunction. Mild mitral regurgitation is present. The aortic valve leaflets   are slightly thickened /calcified. Mild-to-moderate aortic regurgitation is   present. Moderate aortic stenosis with a peak velocity of 3.05m/s and a mean   pressure gradient of 23mmHg. Mild tricuspid regurgitation.  Estimated   pulmonary artery systolic pressure is at 39 mmHg assuming a right atrial   pressure of 15 mmHg. The right ventricle appears normal in size but   hypokinetic. TAPSE measures 1.11cm and the RVS velocity measures 5.46 cm/s. There is a left pleural effusion. IVC size is dilated (>2.1 cm) and   collapses < 50% with respiration consistent with elevated RA pressure (15   mmHg). Stress Test / Angiogram:    ECG: On 11/4/2022 sinus rhythm 92/min. Right bundle branch block    . Nonspecific ST and T wave changes. This patient was educated using the patient point room wall [employer] device. Absence from smokers and smoking and diet and exercising are important. Assessment:   Ischemic cardiomyopathy. CAD and post bypass graft surgery. Plan:   Continue the life vest.  Refer to EP for consideration of a implanted defibrillator. Continue Lasix but reduce sodium and fluid intake. Return to see me in 4 weeks. We will have to recheck his electrolytes etc.  At this time I do not anticipate cardiac catheterization on this patient. Please call if we can assist further [phone]. Becky Workman.  Eugenio STEIN, [facility]      This note was likely completed using voice recognition technology and may contain unintended errors

## 2022-11-21 ENCOUNTER — CARE COORDINATION (OUTPATIENT)
Dept: CASE MANAGEMENT | Age: 87
End: 2022-11-21

## 2022-11-21 NOTE — CARE COORDINATION
Date/Time:  11/21/2022 1:00 PM  2ND Attempted to reach patient by telephone. Call within 2 business days of discharge: Yes,  Left HIPPA compliant message requesting a return call. Will attempt to reach patient again. CTN will not close out episode just yet, will do a 3rd attempt before closing out.     Thank Nathanael Huynh RN  Care Transition Coordinator  Contact [facility]:[phone]

## 2022-11-23 ENCOUNTER — OFFICE VISIT (OUTPATIENT)
Dept: INTERNAL MEDICINE CLINIC | Age: 87
End: 2022-11-23
Payer: MEDICARE

## 2022-11-23 ENCOUNTER — CARE COORDINATION (OUTPATIENT)
Dept: CASE MANAGEMENT | Age: 87
End: 2022-11-23

## 2022-11-23 VITALS
RESPIRATION RATE: 16 BRPM | HEIGHT: 74 IN | DIASTOLIC BLOOD PRESSURE: 62 MMHG | TEMPERATURE: 97.8 F | HEART RATE: 91 BPM | BODY MASS INDEX: 19.2 KG/M2 | OXYGEN SATURATION: 95 % | WEIGHT: 149.6 LBS | SYSTOLIC BLOOD PRESSURE: 110 MMHG

## 2022-11-23 DIAGNOSIS — E78.5 HYPERLIPIDEMIA, UNSPECIFIED HYPERLIPIDEMIA TYPE: ICD-10-CM

## 2022-11-23 DIAGNOSIS — F01.A0 MILD VASCULAR DEMENTIA WITHOUT BEHAVIORAL DISTURBANCE, PSYCHOTIC DISTURBANCE, MOOD DISTURBANCE, OR ANXIETY: ICD-10-CM

## 2022-11-23 DIAGNOSIS — N40.1 BENIGN PROSTATIC HYPERPLASIA WITH URINARY FREQUENCY: ICD-10-CM

## 2022-11-23 DIAGNOSIS — I42.9 CARDIOMYOPATHY, UNSPECIFIED TYPE (HCC): ICD-10-CM

## 2022-11-23 DIAGNOSIS — R35.0 BENIGN PROSTATIC HYPERPLASIA WITH URINARY FREQUENCY: ICD-10-CM

## 2022-11-23 DIAGNOSIS — I10 PRIMARY HYPERTENSION: ICD-10-CM

## 2022-11-23 DIAGNOSIS — H91.93 BILATERAL HEARING LOSS, UNSPECIFIED HEARING LOSS TYPE: ICD-10-CM

## 2022-11-23 LAB
CHP ED QC CHECK: NORMAL
GLUCOSE BLD-MCNC: 107 MG/DL
HBA1C MFR BLD: 6 %

## 2022-11-23 PROCEDURE — 82962 GLUCOSE BLOOD TEST: CPT | Performed by: INTERNAL MEDICINE

## 2022-11-23 PROCEDURE — 83036 HEMOGLOBIN GLYCOSYLATED A1C: CPT | Performed by: INTERNAL MEDICINE

## 2022-11-23 PROCEDURE — 99214 OFFICE O/P EST MOD 30 MIN: CPT | Performed by: INTERNAL MEDICINE

## 2022-11-23 PROCEDURE — 1123F ACP DISCUSS/DSCN MKR DOCD: CPT | Performed by: INTERNAL MEDICINE

## 2022-11-23 PROCEDURE — 3074F SYST BP LT 130 MM HG: CPT | Performed by: INTERNAL MEDICINE

## 2022-11-23 PROCEDURE — 3078F DIAST BP <80 MM HG: CPT | Performed by: INTERNAL MEDICINE

## 2022-11-23 NOTE — PROGRESS NOTES
Patient: Rodrigo Godinez is a 82 y.o. male who presents today with the following Chief Complaint(s):    Chief Complaint   Patient presents with    Follow-up         HPIHe is here for a checkup. Recent hospitalization for shortness of breath with diagnosis of CHF. ProBNP 8000. Treated with iv lasix, fluid and salt restriction. B-Blker continued, Lisinopril changed to Entresto, aldactone started, subsequently SGLT2i, imdur/hydralazine started. Life vest ordered prior to discharge. ECHO, LVEF 15 to 20 %. Cardiology f/u. COVID 19 infection discovered. No 02 requirement. Home support system consist of daughter who stays in the home. Wife is home but is challenged from prior stroke. Grandson there also. Son who accompanies him today visits home routinely. Now OT/PT/CNA visit 2 to 3 times per week. Still to see neurology for f/u on dementia. Son notes he is more alert since hospitalization. Sons number is [phone]. Current Outpatient Medications   Medication Sig Dispense Refill    furosemide (LASIX) 40 MG tablet Take 1 tablet by mouth daily 60 tablet 3    spironolactone (ALDACTONE) 25 MG tablet Take 1 tablet by mouth daily 30 tablet 3    aspirin 81 MG chewable tablet Take 1 tablet by mouth daily 30 tablet 3    sacubitril-valsartan (ENTRESTO) 24-26 MG per tablet Take 1 tablet by mouth 2 times daily 60 tablet 3    tamsulosin (FLOMAX) 0.4 MG capsule Take 0.4 mg by mouth daily      atorvastatin (LIPITOR) 40 MG tablet Take 1 tablet by mouth daily for cholesterol. 90 tablet 3    metoprolol succinate (TOPROL XL) 25 MG extended release tablet TAKE 1 TABLET BY MOUTH DAILY FOR BLOOD PRESSURE AND HEART 90 tablet 3    oxybutynin (DITROPAN XL) 5 MG extended release tablet Take 1 tablet by mouth daily 30 tablet 3     No current facility-administered medications for this visit.        Patient's past medical history, surgical history, family history, medications,and allergies  were all reviewed and updated as appropriate today. Review of Systems   Constitutional: Negative. HENT:          Hearing loss. To go to [facility] for hearing aid. Less expensive. Eyes: Negative. Respiratory: Negative. Cardiovascular:         Cardiomyopathy. See HPI. Hypertension, see med list.    Gastrointestinal: Negative. Endocrine:        Hyperlipidemia, treated with statin. Genitourinary: Negative. BPH, takes tamsulosin. Musculoskeletal: Negative. Skin: Negative. Neurological: Negative. Psychiatric/Behavioral:          Dementia, as prior outlined. No change. Physical Exam  Constitutional:       General: He is not in acute distress. Appearance: He is well-developed. HENT:      Head: Normocephalic and atraumatic. Right Ear: External ear normal.      Left Ear: External ear normal.      Nose: Nose normal.   Eyes:      General: No scleral icterus. Conjunctiva/sclera: Conjunctivae normal.      Pupils: Pupils are equal, round, and reactive to light. Neck:      Thyroid: No thyromegaly. Cardiovascular:      Rate and Rhythm: Normal rate and regular rhythm. Heart sounds: Normal heart sounds. Comments: 2/6 holosystolic murmur LSB. Pulmonary:      Effort: Pulmonary effort is normal.      Breath sounds: Normal breath sounds. Abdominal:      General: Bowel sounds are normal.      Palpations: Abdomen is soft. There is no mass. Musculoskeletal:      Cervical back: Normal range of motion and neck supple. Comments: 1+ leg edema. Lymphadenopathy:      Cervical: No cervical adenopathy. Skin:     General: Skin is warm and dry. Neurological:      Mental Status: He is alert and oriented to person, place, and time. Deep Tendon Reflexes: Reflexes are normal and symmetric. Psychiatric:         Behavior: Behavior normal.      Comments: Memory and cognitive challenges. No change.         Vitals:    11/23/22 0923   BP: 110/62   Pulse:    Resp:    Temp:    SpO2: Assessment:   Diagnosis Orders   1. Cardiomyopathy, unspecified type ([facility] [address]  Heart healthy lifestyle stressed to patient and son. Medication compliance. Cardiology f/u.         2. Primary hypertension  Continue medication regimen. DASH and some sodium diet discussed. 3. Mild vascular dementia without behavioral disturbance, psychotic disturbance, mood disturbance, or anxiety  System in place. Planning and organizing stressed. Proper rest, healthy diet and regular exercise discussed. 4. Hyperlipidemia, unspecified hyperlipidemia type  POCT glycosylated hemoglobin (Hb A1C)    POCT Glucose  Hear healthy diet and statin compliance discussed. 5. Benign prostatic hyperplasia with urinary frequency  Continue Tamsulosin. 6. Bilateral hearing loss, unspecified hearing loss type  To obtain hearing aids. Plan:  See plans above.

## 2022-11-23 NOTE — PATIENT INSTRUCTIONS
[facility] Locations - No appointment necessary. @ indicates the location is open Saturdays in addition to Monday through Friday. Call your preferred location for test preparation, business hours and other information you need. [employer] accepts [employer]. [facility]    @ [place] [facility] 3 [facility] [address] [place] [address]. [place], [address]   Ph: [phone] [facility] S[facility]. [address]   Ph: [phone]  @ [facility] S[facility]. 3[address] [facility]   Ph: [phone]    [facility] S[facility]. [address]   Ph: [phone] @ [place] [facility]. [address]  Ph: [phone] @ Jun [facility] [address] [facility]. Edmund Alfar[address]   Ph: [phone]     Milton Sorensen Sv. [place] [address]  Ph: [phone]    [place]   @ [place] [facility] [address] [place], [place] [address]   Ph: [phone] [facility] Bld[facility]. [address] [facility], [address] [place] [address]  Ph: [phone] Lab Svcs[address] 54 [facility]   Ph: [phone] [facility]. Lab Svcs.   [address] [facility], [facility] [facility]   Ph: [phone]

## 2022-11-23 NOTE — CARE COORDINATION
Date/Time:  11/23/2022 1:41 PM  3RD Attempted to reach patient by telephone. Call within 2 business days of discharge: Yes,  CTN unable to LVM, mailbox full, CTN will close out CTN/COVID -19 Monitoring episode at this time.     Thank Vikram Lozada RN  Care Transition Coordinator  Contact [facility] [place]:[phone]

## 2022-12-19 ENCOUNTER — OFFICE VISIT (OUTPATIENT)
Dept: CARDIOLOGY CLINIC | Age: 87
End: 2022-12-19
Payer: MEDICARE

## 2022-12-19 VITALS
WEIGHT: 155 LBS | BODY MASS INDEX: 19.9 KG/M2 | DIASTOLIC BLOOD PRESSURE: 40 MMHG | HEART RATE: 68 BPM | SYSTOLIC BLOOD PRESSURE: 60 MMHG

## 2022-12-19 DIAGNOSIS — Z00.00 ROUTINE CHECK-UP: Primary | ICD-10-CM

## 2022-12-19 PROCEDURE — 1123F ACP DISCUSS/DSCN MKR DOCD: CPT | Performed by: INTERNAL MEDICINE

## 2022-12-19 PROCEDURE — 3074F SYST BP LT 130 MM HG: CPT | Performed by: INTERNAL MEDICINE

## 2022-12-19 PROCEDURE — 3078F DIAST BP <80 MM HG: CPT | Performed by: INTERNAL MEDICINE

## 2022-12-19 PROCEDURE — 99214 OFFICE O/P EST MOD 30 MIN: CPT | Performed by: INTERNAL MEDICINE

## 2022-12-19 PROCEDURE — 93000 ELECTROCARDIOGRAM COMPLETE: CPT | Performed by: INTERNAL MEDICINE

## 2022-12-19 NOTE — PROGRESS NOTES
[facility]   Dr Robyn King. Suad Ramirez MD, [address] [facility]    Outpatient Follow Up Note    2022,11:12 AM  Subjective:   CHIEF COMPLAINT / HPI:  Follow Up secondary to CAD and post bypass and cardiomyopathy. Tor Landa is 82 y.o. male who here with his son and has apparent progressive dementia. Follow-up of his CAD and previous interventions. Apparently having significant and profound bradycardia and was in the hospital with additional bradycardia and his medications were have been adjusted. Today he is arousable but and he I think he recognizes me. He did have progressive cardiomyopathy and did have a LifeVest  placed. Still awaiting his 3 months wait time for a permanent device to be implanted. On examination today he looks very sleepy drowsy and seems to recognize me. Blood pressure 1 is 60/40 here in the office and we repeated it couple times and still quite low pulse of 68. We will need to decrease his current medication. Covid pneumonia   CHF ISCHEMIC CARDIOMYOPATHY EF 15-20%  CAD and CABG  TURP for BPH     Past Medical History:    Past Medical History:   Diagnosis Date    Arthritis     Balance disorder     CAD (coronary artery disease)     Chest pain     Dental disease     Dizziness     Dyslipidemia     Fatigue     Hard of hearing     History of prostate cancer     Hypertension     Hyperthyroidism     Impaired memory     SOB (shortness of breath)     Vitamin D deficiency      Past Surgical History  Past Surgical History:   Procedure Laterality Date    CARDIAC SURGERY       Social History:       Social History     Tobacco Use   Smoking Status Former    Types: Cigarettes    Quit date: 1975    Years since quittin.9   Smokeless Tobacco Never     Current Medications:  Prior to Visit Medications    Medication Sig Taking?  Authorizing Provider   furosemide (LASIX) 40 MG tablet Take 1 tablet by mouth daily Yes Hilary Cobb MD   spironolactone (ALDACTONE) 25 MG tablet Take 1 tablet by mouth daily Yes Citlaly Madsen MD   aspirin 81 MG chewable tablet Take 1 tablet by mouth daily Yes Citlaly Madsen MD   sacubitril-valsartan (ENTRESTO) 24-26 MG per tablet Take 1 tablet by mouth 2 times daily Yes Citlaly Madsen MD   tamsulosin (FLOMAX) 0.4 MG capsule Take 0.4 mg by mouth daily Yes Kelly Lopez MD   atorvastatin (LIPITOR) 40 MG tablet Take 1 tablet by mouth daily for cholesterol. Yes Syeda Ray MD   metoprolol succinate (TOPROL XL) 25 MG extended release tablet TAKE 1 TABLET BY MOUTH DAILY FOR BLOOD PRESSURE AND HEART Yes Syeda Ray MD   oxybutynin (DITROPAN XL) 5 MG extended release tablet Take 1 tablet by mouth daily Yes Syeda Ray MD     Family History  Family History   Problem Relation Age of Onset    Heart Disease Mother     Heart Disease Father        Current Medications  Current Outpatient Medications   Medication Sig Dispense Refill    furosemide (LASIX) 40 MG tablet Take 1 tablet by mouth daily 60 tablet 3    spironolactone (ALDACTONE) 25 MG tablet Take 1 tablet by mouth daily 30 tablet 3    aspirin 81 MG chewable tablet Take 1 tablet by mouth daily 30 tablet 3    sacubitril-valsartan (ENTRESTO) 24-26 MG per tablet Take 1 tablet by mouth 2 times daily 60 tablet 3    tamsulosin (FLOMAX) 0.4 MG capsule Take 0.4 mg by mouth daily      atorvastatin (LIPITOR) 40 MG tablet Take 1 tablet by mouth daily for cholesterol. 90 tablet 3    metoprolol succinate (TOPROL XL) 25 MG extended release tablet TAKE 1 TABLET BY MOUTH DAILY FOR BLOOD PRESSURE AND HEART 90 tablet 3    oxybutynin (DITROPAN XL) 5 MG extended release tablet Take 1 tablet by mouth daily 30 tablet 3     No current facility-administered medications for this visit. REVIEW OF SYSTEMS:    CONSTITUTIONAL: No major weight gain or loss, fatigue, weakness, night sweats or fever. HEENT: No new vision difficulties or ringing in the ears.   RESPIRATORY: No new SOB, PND, orthopnea or cough. CARDIOVASCULAR: See HPI  GI: No nausea, vomiting, diarrhea, constipation, abdominal pain or changes in bowel habits. : No urinary frequency, urgency, incontinence hematuria or dysuria. SKIN: No cyanosis or skin lesions. MUSCULOSKELETAL: No new muscle or joint pain. NEUROLOGICAL: No syncope or TIA-like symptoms. PSYCHIATRIC: No anxiety, pain, insomnia or depression    Objective:   PHYSICAL EXAM:        VITALS:    Wt Readings from Last 3 Encounters:   12/19/22 155 lb (70.3 kg)   11/23/22 149 lb 9.6 oz (67.9 kg)   11/18/22 147 lb (66.7 kg)     BP Readings from Last 3 Encounters:   12/19/22 (!) 60/40   11/23/22 110/62   11/18/22 106/60     Pulse Readings from Last 3 Encounters:   12/19/22 68   11/23/22 91   11/18/22 70       CONSTITUTIONAL: Cooperative, no apparent distress, and appears well nourished / developed  NEUROLOGIC:  Awake and orientated to person, place and time. PSYCH: Calm affect. SKIN: Warm and dry. HEENT: Sclera non-icteric, normocephalic, neck supple, no elevation of JVP, normal carotid pulses with no bruits and thyroid normal size. LUNGS:  No increased work of breathing and clear to auscultation, no crackles or wheezing  CARDIOVASCULAR:  Regular rate and rhythm with no murmurs, gallops, rubs, or abnormal heart sounds, normal PMI. The apical impulses not displaced  Heart tones are crisp and normal  Cervical veins are not engorged  The carotid upstroke is normal in amplitude and contour without delay or bruit  JVP is not elevated  ABDOMEN:  Normal bowel sounds, non-distended and non-tender to palpation  EXT: Mild lower extremity edema, no calf tenderness. Pulses are present bilaterally.     DATA:    Lab Results   Component Value Date    ALT 28 10/12/2022    AST 22 10/12/2022    ALKPHOS 90 10/12/2022    BILITOT 0.4 10/12/2022     Lab Results   Component Value Date    CREATININE 1.0 11/09/2022    BUN 21 (H) 11/09/2022     11/09/2022    K 3.9 11/09/2022     11/09/2022    CO2 32 11/09/2022     Lab Results   Component Value Date    TSH 0.96 12/15/2011     Lab Results   Component Value Date    WBC 4.0 11/06/2022    HGB 14.2 11/06/2022    HCT 43.4 11/06/2022    MCV 87.6 11/06/2022     11/06/2022     No components found for: CHLPL  Lab Results   Component Value Date    TRIG 71 10/12/2022    TRIG 47 04/08/2022    TRIG 43 05/14/2021     Lab Results   Component Value Date    HDL 57 10/12/2022    HDL 56 04/08/2022    HDL 58 05/14/2021     Lab Results   Component Value Date    LDLCALC 124 (H) 10/12/2022    LDLCALC 106 (H) 04/08/2022    LDLCALC 79 05/14/2021     Lab Results   Component Value Date    LABVLDL 14 10/12/2022    LABVLDL 9 04/08/2022    LABVLDL 9 05/14/2021     Radiology Review:  Pertinent images / reports were reviewed as a part of this visit and reveals the following:    Echo:   Summary 11/7/22   Left ventricular cavity size is dilated. Normal left ventricular wall   thickness. Overall left ventricular systolic function appears severely   reduced with an ejection fraction of 15-20%. There is severe diffuse   hypokinesis. Diastolic filling parameters suggest grade I diastolic   dysfunction. Mild mitral regurgitation is present. The aortic valve leaflets   are slightly thickened /calcified. Mild-to-moderate aortic regurgitation is   present. Moderate aortic stenosis with a peak velocity of 3.05m/s and a mean   pressure gradient of 23mmHg. Mild tricuspid regurgitation. Estimated   pulmonary artery systolic pressure is at 39 mmHg assuming a right atrial   pressure of 15 mmHg. The right ventricle appears normal in size but   hypokinetic. TAPSE measures 1.11cm and the RVS velocity measures 5.46 cm/s. There is a left pleural effusion. IVC size is dilated (>2.1 cm) and   collapses < 50% with respiration consistent with elevated RA pressure (15   mmHg). Stress Test / Angiogram:    ECG: On 11/4/2022 sinus rhythm 92/min. Right bundle branch block    .   Nonspecific ST and T wave changes. EKG on 12/19/2022 sinus rhythm 66/min. Right bundle branch block. Nonspecific T wave changes. Left axis deviation. This patient was educated using the patient point room wall mount device. Absence from smokers and smoking and diet and exercising are important. Assessment:   Ischemic cardiomyopathy. CAD and post bypass graft surgery. Plan:   Continue the life vest.  Refer to EP for consideration of a implanted defibrillator. Reduce Entresto to 25 mg twice daily. Reduce metoprolol to 12.5 mg daily. Reduce spironolactone to 12.5 mg daily. Return in 3 months. Again is being referred to EP for consideration for the AICD  Please call if we can assist further [phone]. Osvaldo Gerardo.  Eugenio STEIN, [facility]      This note was likely completed using voice recognition technology and may contain unintended errors

## 2022-12-21 NOTE — PROGRESS NOTES
[facility], [place]   Electrophysiology Consultation   Date: 12/21/2022  Reason for Consultation: ICD consideration    Consult Requesting Physician: Fritz Guerra MD    CC: ***   HPI: Lyly Law is a 82 y.o. male with a past medical history of CAD s/p CABG '09, cardiomyopathy, HTN, and HLD. 11/04/22 Pt presented to [facility] with complaints of dizziness. Patient noted to have increased swelling in ankles and some shortness of breath with mild cough. Elevated BNP and edema consistent with CHF. Patient diuresed with IV Lasix. Echo revealed EF of 15-20%. Cardiomyopathy noted. NSVT also noted and Toprol increased to 25 mg BID. Discharged with [facility].     Review of System:  [x] Full ROS obtained and negative except as mentioned in HPI or below      Prior to Admission medications    Medication Sig Start Date End Date Taking? Authorizing Provider   furosemide (LASIX) 40 MG tablet Take 1 tablet by mouth daily 11/11/22   Milton Schumacher MD   spironolactone (ALDACTONE) 25 MG tablet Take 1 tablet by mouth daily  Patient taking differently: Take 12.5 mg by mouth daily Take 1/2 tablet daily. 11/11/22   Milton Schumacher MD   aspirin 81 MG chewable tablet Take 1 tablet by mouth daily 11/11/22   Milton Schumacher MD   sacubitril-valsartan (ENTRESTO) 24-26 MG per tablet Take 1 tablet by mouth 2 times daily  Patient taking differently: Take 1 tablet by mouth 2 times daily Take 1/2 tablet twice a day. 11/10/22   Milton Schumacher MD   tamsulosin (FLOMAX) 0.4 MG capsule Take 0.4 mg by mouth daily    Historical Provider, MD   atorvastatin (LIPITOR) 40 MG tablet Take 1 tablet by mouth daily for cholesterol. 9/23/22 12/22/22  Jessica Green MD   metoprolol succinate (TOPROL XL) 25 MG extended release tablet TAKE 1 TABLET BY MOUTH DAILY FOR BLOOD PRESSURE AND HEART  Patient taking differently: Take 12.5 mg by mouth daily Take 1/2 tablet daily.  9/21/22 12/20/22  Jessica Green MD   oxybutynin (DITROPAN XL) 5 MG extended release tablet Take 1 tablet by mouth daily 9/1/22   Gucci Winkler MD       Past Medical History:   Diagnosis Date    Arthritis     Balance disorder     CAD (coronary artery disease)     Chest pain     Dental disease     Dizziness     Dyslipidemia     Fatigue     Hard of hearing     History of prostate cancer     Hypertension     Hyperthyroidism     Impaired memory     SOB (shortness of breath)     Vitamin D deficiency         Past Surgical History:   Procedure Laterality Date    CARDIAC SURGERY         No Known Allergies    Social History:  Reviewed. reports that he quit smoking about 48 years ago. His smoking use included cigarettes. He has never used smokeless tobacco. He reports current alcohol use. He reports that he does not use drugs. Family History:  Reviewed. No family history of SCD. Physical Examination:  There were no vitals filed for this visit. Wt Readings from Last 3 Encounters:   12/19/22 155 lb (70.3 kg)   11/23/22 149 lb 9.6 oz (67.9 kg)   11/18/22 147 lb (66.7 kg)         Labs:  Lab Results   Component Value Date    ALT 28 10/12/2022    AST 22 10/12/2022     11/09/2022    K 3.9 11/09/2022    HGB 14.2 11/06/2022     11/09/2022    CREATININE 1.0 11/09/2022    BUN 21 (H) 11/09/2022    TSH 0.96 12/15/2011       Imaging:  ECG 12/21/22  ***SR/AFIB, QTcH ***,QRS ***    Echo 11/07/2022   Summary   Left ventricular cavity size is dilated. Normal left ventricular wall   thickness. Overall left ventricular systolic function appears severely   reduced with an ejection fraction of 15-20%. There is severe diffuse   hypokinesis. Diastolic filling parameters suggest grade I diastolic   dysfunction. Mild mitral regurgitation is present. The aortic valve leaflets   are slightly thickened /calcified. Mild-to-moderate aortic regurgitation is   present. Moderate aortic stenosis with a peak velocity of 3.05m/s and a mean   pressure gradient of 23mmHg. Mild tricuspid regurgitation.  Estimated pulmonary artery systolic pressure is at 39 mmHg assuming a right atrial   pressure of 15 mmHg. The right ventricle appears normal in size but   hypokinetic. TAPSE measures 1.11cm and the RVS velocity measures 5.46 cm/s. There is a left pleural effusion. IVC size is dilated (>2.1 cm) and   collapses < 50% with respiration consistent with elevated RA pressure (15   mmHg). Stress Test 12/0/2015      Summary    No evidence for ischemia. TID is normal at 0.91    Left ventricular ejection fraction of 52 %. I independently reviewed relevant and available cardiac diagnostic tests ECG, CXR, Echo, Stress test, Device interrogation, Holter, CT scan.     *** Outside medical records via Care everywhere reviewed and summarized in H&P above. Assessment/Plan:  Ischemic Cardiomyopathy   - currently wearing Life Vest   - EF 15-20% per Echo 11/07/22  - on Entresto, Lasix, Spironolactone, and Toprol XL     CAD  - follows with Dr. Sahu Mantle   - s/p CABG 2009   - On ASA,Atorvastatin, and Toprol XL    HTN  - Controlled/Not well controlled***  - BP goal <130/80  - Home BP monitoring encouraged, printed information provided on how to accurately measure BP at home. - Counseled to follow a low salt diet to assure blood pressure remains controlled for cardiovascular risk factor modification.   - The patient is counseled to get regular exercise 3-5 times per week and maintain a healthy weight reduce cardiovascular risk factors. NOTE: This report was transcribed using voice recognition software. Every effort was made to ensure accuracy, however, inadvertent computerized transcription errors may be present.      Petey Le MD  [facility], [place], operated by [facility]   Office: [phone]  Fax: [phone]

## 2023-01-02 DIAGNOSIS — E78.00 PURE HYPERCHOLESTEROLEMIA: ICD-10-CM

## 2023-01-02 ASSESSMENT — ENCOUNTER SYMPTOMS
EYES NEGATIVE: 1
GASTROINTESTINAL NEGATIVE: 1
RESPIRATORY NEGATIVE: 1

## 2023-01-03 RX ORDER — ATORVASTATIN CALCIUM 40 MG/1
40 TABLET, FILM COATED ORAL DAILY
Qty: 90 TABLET | Refills: 3 | Status: SHIPPED | OUTPATIENT
Start: 2023-01-03 | End: 2023-04-03

## 2023-01-03 NOTE — TELEPHONE ENCOUNTER
Medication:   Requested Prescriptions     Pending Prescriptions Disp Refills    atorvastatin (LIPITOR) 40 MG tablet [Pharmacy Med Name: ATORVASTATIN 40MG TABLETS] 90 tablet 3     Sig: TAKE 1 TABLET BY MOUTH DAILY FOR CHOLESTEROL        Last Filled: 09/23/22    Patient Phone Number: [phone] (home)     Last appt: 11/23/2022   Next appt: 1/12/2023

## 2023-01-06 NOTE — PROGRESS NOTES
[address]   Electrophysiology Consultation   Date: 1/10/2023  Reason for Consultation: ICD consideration    Consult Requesting Physician: Yao Cano MD    CC:  Ischemic cardiomyopathy    HPI: Noemi Gan is a 82 y.o. male with a past medical history of CAD s/p CABG '09, cardiomyopathy, HTN, and HLD. 11/04/22 Pt presented to [facility] with complaints of dizziness. Patient noted to have increased swelling in ankles and some shortness of breath with mild cough. Elevated BNP and edema consistent with CHF. Patient diuresed with IV Lasix. Echo revealed EF of 15-20%. Cardiomyopathy noted. NSVT also noted and Toprol increased to 25 mg BID. Discharged with Marquise Butcher presents today  with his son to discuss his cardiomyopathy and possible ICD placement. He is wearing his life vest.  He had been having low BP and Dr. Edelmira Hughes reduced his metoprolol, entresto, aldactone. Denies palpitations, racing heart or syncope. Denies PND or orthopnea. Has noted increased swelling in his legs. Review of System:  [x] Full ROS obtained and negative except as mentioned in HPI or below      Prior to Admission medications    Medication Sig Start Date End Date Taking? Authorizing Provider   oxybutynin (DITROPAN-XL) 5 MG extended release tablet TAKE 1 TABLET BY MOUTH DAILY 1/9/23  Yes Gucci Winkler MD   atorvastatin (LIPITOR) 40 MG tablet TAKE 1 TABLET BY MOUTH DAILY FOR CHOLESTEROL 1/3/23 4/3/23 Yes Gucci Winkler MD   furosemide (LASIX) 40 MG tablet Take 1 tablet by mouth daily 11/11/22  Yes Stephanie Leon MD   spironolactone (ALDACTONE) 25 MG tablet Take 1 tablet by mouth daily  Patient taking differently: Take 12.5 mg by mouth daily Take 1/2 tablet daily.  11/11/22  Yes Stephanie Leon MD   aspirin 81 MG chewable tablet Take 1 tablet by mouth daily 11/11/22  Yes Stephanie Leon MD   sacubitril-valsartan (ENTRESTO) 24-26 MG per tablet Take 1 tablet by mouth 2 times daily  Patient taking differently: Take 1 tablet by mouth 2 times daily Take 1/2 tablet twice a day. 11/10/22  Yes Pervis Remedies, MD   tamsulosin (FLOMAX) 0.4 MG capsule Take 0.4 mg by mouth daily   Yes Historical Provider, MD   metoprolol succinate (TOPROL XL) 25 MG extended release tablet TAKE 1 TABLET BY MOUTH DAILY FOR BLOOD PRESSURE AND HEART  Patient taking differently: Take 12.5 mg by mouth daily Take 1/2 tablet daily. 9/21/22 1/10/23 Yes Vanessa Luna MD       Past Medical History:   Diagnosis Date    Arthritis     Balance disorder     CAD (coronary artery disease)     Chest pain     Dental disease     Dizziness     Dyslipidemia     Fatigue     Hard of hearing     History of prostate cancer     Hypertension     Hyperthyroidism     Impaired memory     SOB (shortness of breath)     Vitamin D deficiency         Past Surgical History:   Procedure Laterality Date    CARDIAC SURGERY         No Known Allergies    Social History:  Reviewed. reports that he quit smoking about 48 years ago. His smoking use included cigarettes. He has never used smokeless tobacco. He reports current alcohol use. He reports that he does not use drugs. Family History:  Reviewed. No family history of SCD.         Physical Examination:  Vitals:    01/10/23 1316   BP: 110/70   Pulse: 71      Wt Readings from Last 3 Encounters:   01/10/23 160 lb (72.6 kg)   12/19/22 155 lb (70.3 kg)   11/23/22 149 lb 9.6 oz (67.9 kg)     No acute distress  Moist mucosa, nonicteric conjunctiva  Diminished aeration, clear, nonlabored, no rhonchi or wheeze  Heart is regular rate, regular rhythm, systolic murmur right upper sternal border, 2+ pitting edema of bilateral lower extremities, worse on the left, extremities are warm and well-perfused, 2+ radial pulse bilaterally  Abdomen is soft, nontender  Strength is grossly preserved, gait assisted with cane, moves all extremities, normal tone  No focal neurologic deficits  Appropriate mood and affect  No rashes or ecchymoses      Labs:  Lab Results   Component Value Date    ALT 28 10/12/2022    AST 22 10/12/2022     11/09/2022    K 3.9 11/09/2022    HGB 14.2 11/06/2022     11/09/2022    CREATININE 1.0 11/09/2022    BUN 21 (H) 11/09/2022    TSH 0.96 12/15/2011       Imaging:  ECG 1/10/23  SR QTcH 465 ,, RBBB, inferior MI old    Echo 11/07/2022   Summary   Left ventricular cavity size is dilated. Normal left ventricular wall   thickness. Overall left ventricular systolic function appears severely   reduced with an ejection fraction of 15-20%. There is severe diffuse   hypokinesis. Diastolic filling parameters suggest grade I diastolic   dysfunction. Mild mitral regurgitation is present. The aortic valve leaflets   are slightly thickened /calcified. Mild-to-moderate aortic regurgitation is   present. Moderate aortic stenosis with a peak velocity of 3.05m/s and a mean   pressure gradient of 23mmHg. Mild tricuspid regurgitation. Estimated   pulmonary artery systolic pressure is at 39 mmHg assuming a right atrial   pressure of 15 mmHg. The right ventricle appears normal in size but   hypokinetic. TAPSE measures 1.11cm and the RVS velocity measures 5.46 cm/s. There is a left pleural effusion. IVC size is dilated (>2.1 cm) and   collapses < 50% with respiration consistent with elevated RA pressure (15   mmHg). Stress Test 12/0/2015      Summary    No evidence for ischemia. TID is normal at 0.91    Left ventricular ejection fraction of 52 %. Assessment/Plan:    Ischemic Cardiomyopathy with reduced LVEF 25-30%  - EF 15-20% per Echo 11/07/22  - on Entresto, Lasix, Spironolactone, and Toprol XL   - titration of GDMT is limited by hypotension   - EKG with wide QRS in RBBB pattern  - discussed role of GDMT in improving cardiac function, if his LVEF remains low on repeat limited echo in 1 month (3 months of medical therapy) then he would be a candidate for an ICD.  Discussed role of ICD in preventing cardiac arrest/death, role of GDMT in improving. Expect > 1 year survival.   - I have discussed the procedure, risks, benefits and alternatives in detail with patient and family. I gave printed material about AICD implantation, risks and benefits. The risks including, but not limited to, the risks of bleeding, infection, pain, device malfunction, lead dislodgement, radiation exposure, injury to cardiac and surrounding structures (including pneumothorax), stroke, cardiac perforation, tamponade, need for emergent heart surgery, myocardial infarction and death were discussed in detail. Decision aid tool for patient considering ICD implantation for primary prevention \"[place] Program for Patient-Centered Decision\" were used for shared decision making and a copy was given to patient for review. CAD  - follows with Dr. Mingo Lopez   - s/p CABG 2009   - Cont ASA, Atorvastatin, and Toprol XL    HTN  - Controlled   - BP goal <130/80  - Home BP monitoring encouraged, printed information provided on how to accurately measure BP at home. - Counseled to follow a low salt diet to assure blood pressure remains controlled for cardiovascular risk factor modification.   - The patient is counseled to get regular exercise 3-5 times per week and maintain a healthy weight reduce cardiovascular risk factors. Repeat ECHO and clinic visit in 1 month, ICD at that time if LVEF remains low, take additional lasix tab for 3-5 days for LE swelling, follow up with HF       Scribe attestation: This note was scribed in the presence of  Zohra Johnston MD by Adrien Ko, RICCO    Physician Attestation: I, Dr. Zohra Johnston, confirm that the scribe's documentation has been prepared under my direction and personally reviewed by me in its entirety. I also confirm that the note above accurately reflects all work, treatment, procedures, and medical decision making performed by me. NOTE: This report was transcribed using voice recognition software.  Every effort was made to ensure accuracy, however, inadvertent computerized transcription errors may be present.      MD Shelli Wong [address]   Office: [phone]  Fax: [phone]

## 2023-01-08 DIAGNOSIS — N32.81 OAB (OVERACTIVE BLADDER): ICD-10-CM

## 2023-01-09 RX ORDER — OXYBUTYNIN CHLORIDE 5 MG/1
5 TABLET, EXTENDED RELEASE ORAL DAILY
Qty: 90 TABLET | Refills: 1 | Status: SHIPPED | OUTPATIENT
Start: 2023-01-09

## 2023-01-09 NOTE — TELEPHONE ENCOUNTER
Medication:   Requested Prescriptions     Pending Prescriptions Disp Refills    oxybutynin (DITROPAN-XL) 5 MG extended release tablet [Pharmacy Med Name: OXYBUTYNIN ER 5MG TABLETS] 90 tablet      Sig: TAKE 1 TABLET BY MOUTH DAILY        Last Filled:  12/12/2022    Patient Phone Number: [phone] (home)     Last appt: 11/23/2022   Next appt: 1/12/2023    Last OARRS: No flowsheet data found.

## 2023-01-10 ENCOUNTER — OFFICE VISIT (OUTPATIENT)
Dept: CARDIOLOGY CLINIC | Age: 88
End: 2023-01-10
Payer: MEDICARE

## 2023-01-10 VITALS
BODY MASS INDEX: 20.54 KG/M2 | WEIGHT: 160 LBS | DIASTOLIC BLOOD PRESSURE: 70 MMHG | SYSTOLIC BLOOD PRESSURE: 110 MMHG | HEART RATE: 71 BPM

## 2023-01-10 DIAGNOSIS — I50.9 ACUTE CONGESTIVE HEART FAILURE, UNSPECIFIED HEART FAILURE TYPE (HCC): Primary | ICD-10-CM

## 2023-01-10 DIAGNOSIS — R06.02 SHORTNESS OF BREATH: ICD-10-CM

## 2023-01-10 DIAGNOSIS — I25.5 ISCHEMIC CARDIOMYOPATHY: ICD-10-CM

## 2023-01-10 PROCEDURE — 3078F DIAST BP <80 MM HG: CPT | Performed by: INTERNAL MEDICINE

## 2023-01-10 PROCEDURE — 93000 ELECTROCARDIOGRAM COMPLETE: CPT | Performed by: INTERNAL MEDICINE

## 2023-01-10 PROCEDURE — 99214 OFFICE O/P EST MOD 30 MIN: CPT | Performed by: INTERNAL MEDICINE

## 2023-01-10 PROCEDURE — 3074F SYST BP LT 130 MM HG: CPT | Performed by: INTERNAL MEDICINE

## 2023-01-10 PROCEDURE — 1123F ACP DISCUSS/DSCN MKR DOCD: CPT | Performed by: INTERNAL MEDICINE

## 2023-01-10 RX ORDER — FUROSEMIDE 40 MG/1
40 TABLET ORAL DAILY PRN
Qty: 180 TABLET | Refills: 1 | Status: SHIPPED | OUTPATIENT
Start: 2023-01-10

## 2023-01-19 ENCOUNTER — OFFICE VISIT (OUTPATIENT)
Dept: INTERNAL MEDICINE CLINIC | Age: 88
End: 2023-01-19
Payer: MEDICARE

## 2023-01-19 VITALS
SYSTOLIC BLOOD PRESSURE: 118 MMHG | HEART RATE: 82 BPM | HEIGHT: 74 IN | BODY MASS INDEX: 20.53 KG/M2 | DIASTOLIC BLOOD PRESSURE: 78 MMHG | OXYGEN SATURATION: 98 % | WEIGHT: 160 LBS

## 2023-01-19 DIAGNOSIS — I50.22 CHRONIC SYSTOLIC CONGESTIVE HEART FAILURE (HCC): Primary | ICD-10-CM

## 2023-01-19 DIAGNOSIS — F01.A0 MILD VASCULAR DEMENTIA WITHOUT BEHAVIORAL DISTURBANCE, PSYCHOTIC DISTURBANCE, MOOD DISTURBANCE, OR ANXIETY: ICD-10-CM

## 2023-01-19 DIAGNOSIS — I10 PRIMARY HYPERTENSION: ICD-10-CM

## 2023-01-19 DIAGNOSIS — R35.0 BENIGN PROSTATIC HYPERPLASIA WITH URINARY FREQUENCY: ICD-10-CM

## 2023-01-19 DIAGNOSIS — E78.2 MIXED HYPERLIPIDEMIA: ICD-10-CM

## 2023-01-19 DIAGNOSIS — H91.93 BILATERAL HEARING LOSS, UNSPECIFIED HEARING LOSS TYPE: ICD-10-CM

## 2023-01-19 DIAGNOSIS — N40.1 BENIGN PROSTATIC HYPERPLASIA WITH URINARY FREQUENCY: ICD-10-CM

## 2023-01-19 PROCEDURE — 1123F ACP DISCUSS/DSCN MKR DOCD: CPT | Performed by: INTERNAL MEDICINE

## 2023-01-19 PROCEDURE — 3078F DIAST BP <80 MM HG: CPT | Performed by: INTERNAL MEDICINE

## 2023-01-19 PROCEDURE — 3074F SYST BP LT 130 MM HG: CPT | Performed by: INTERNAL MEDICINE

## 2023-01-19 PROCEDURE — 99214 OFFICE O/P EST MOD 30 MIN: CPT | Performed by: INTERNAL MEDICINE

## 2023-01-19 ASSESSMENT — PATIENT HEALTH QUESTIONNAIRE - PHQ9
1. LITTLE INTEREST OR PLEASURE IN DOING THINGS: 0
SUM OF ALL RESPONSES TO PHQ QUESTIONS 1-9: 0
2. FEELING DOWN, DEPRESSED OR HOPELESS: 0
SUM OF ALL RESPONSES TO PHQ QUESTIONS 1-9: 0
SUM OF ALL RESPONSES TO PHQ QUESTIONS 1-9: 0
SUM OF ALL RESPONSES TO PHQ9 QUESTIONS 1 & 2: 0
SUM OF ALL RESPONSES TO PHQ QUESTIONS 1-9: 0

## 2023-01-19 NOTE — PATIENT INSTRUCTIONS
[facility] Locations - No appointment necessary. @ indicates the location is open Saturdays in addition to Monday through Friday. Call your preferred location for test preparation, business hours and other information you need. [employer] accepts [employer]. [facility]     @ [address] [facility] [address] [facility]. [place], [address] [facility] [address]    Ph: [address]   [address] [facility] [place], [address]    Ph: [phone]   @ [address] [place] [address].[facility]    Ph: [address]    Ph: [phone]  @ [address] [place] [address]   Ph: [phone]  @ [address] [place] [address]. [address]    Ph: [address] [place] [address]   Ph: [phone]    [place]    @ [facility]. [address] [place] [address]    Ph: [phone]  [place] [address] Jarod Francisvarsteven Echols [facility], [address]   Ph: [address] [facility]. [address]: [address] [facility] Olivier [address], [address]    Ph: [phone]   [facility]   [address] [facility]. Olivier Rivera   Ph: [address] [facility]  [address] [place], [place] [address]    Ph: [phone]

## 2023-01-19 NOTE — PROGRESS NOTES
Patient: Renu Honeycutt is a 82 y.o. male who presents today with the following Chief Complaint(s):    Chief Complaint   Patient presents with    Follow-up         HPItherapy, after hospital PT,   Memory, peeing, balance. Leg swelling, increase bid twice weekly. Lowered meds. Hearing,   Has life vest.   Current Outpatient Medications   Medication Sig Dispense Refill    furosemide (LASIX) 40 MG tablet Take 1 tablet by mouth daily as needed (for lower extremity swelling) 180 tablet 1    oxybutynin (DITROPAN-XL) 5 MG extended release tablet TAKE 1 TABLET BY MOUTH DAILY 90 tablet 1    atorvastatin (LIPITOR) 40 MG tablet TAKE 1 TABLET BY MOUTH DAILY FOR CHOLESTEROL 90 tablet 3    furosemide (LASIX) 40 MG tablet Take 1 tablet by mouth daily 60 tablet 3    spironolactone (ALDACTONE) 25 MG tablet Take 1 tablet by mouth daily (Patient taking differently: Take 12.5 mg by mouth daily Take 1/2 tablet daily.) 30 tablet 3    aspirin 81 MG chewable tablet Take 1 tablet by mouth daily 30 tablet 3    sacubitril-valsartan (ENTRESTO) 24-26 MG per tablet Take 1 tablet by mouth 2 times daily (Patient taking differently: Take 1 tablet by mouth 2 times daily Take 1/2 tablet twice a day.) 60 tablet 3    tamsulosin (FLOMAX) 0.4 MG capsule Take 0.4 mg by mouth daily      metoprolol succinate (TOPROL XL) 25 MG extended release tablet TAKE 1 TABLET BY MOUTH DAILY FOR BLOOD PRESSURE AND HEART (Patient taking differently: Take 12.5 mg by mouth daily Take 1/2 tablet daily.) 90 tablet 3     No current facility-administered medications for this visit. Patient's past medical history, surgical history, family history, medications,and allergies  were all reviewed and updated as appropriate today. Review of Systems      Physical Exam    Vitals:    01/19/23 1332   BP: 118/78   Pulse: 82   SpO2: 98%       Assessment:  No diagnosis found. Plan:  There are no diagnoses linked to this encounter.

## 2023-01-23 ENCOUNTER — OFFICE VISIT (OUTPATIENT)
Dept: CARDIOLOGY CLINIC | Age: 88
End: 2023-01-23
Payer: MEDICARE

## 2023-01-23 VITALS — WEIGHT: 155 LBS | HEIGHT: 74 IN | BODY MASS INDEX: 19.89 KG/M2

## 2023-01-23 DIAGNOSIS — I10 PRIMARY HYPERTENSION: ICD-10-CM

## 2023-01-23 DIAGNOSIS — I50.22 CHRONIC SYSTOLIC CONGESTIVE HEART FAILURE (HCC): ICD-10-CM

## 2023-01-23 DIAGNOSIS — E78.2 MIXED HYPERLIPIDEMIA: ICD-10-CM

## 2023-01-23 DIAGNOSIS — I25.10 CORONARY ARTERY DISEASE INVOLVING NATIVE CORONARY ARTERY OF NATIVE HEART WITHOUT ANGINA PECTORIS: Primary | ICD-10-CM

## 2023-01-23 LAB
ANION GAP SERPL CALCULATED.3IONS-SCNC: 12 MMOL/L (ref 3–16)
BUN BLDV-MCNC: 20 MG/DL (ref 7–20)
CALCIUM SERPL-MCNC: 9.7 MG/DL (ref 8.3–10.6)
CHLORIDE BLD-SCNC: 102 MMOL/L (ref 99–110)
CHOLESTEROL, TOTAL: 189 MG/DL (ref 0–199)
CO2: 29 MMOL/L (ref 21–32)
CREAT SERPL-MCNC: 1.1 MG/DL (ref 0.8–1.3)
GFR SERPL CREATININE-BSD FRML MDRD: >60 ML/MIN/{1.73_M2}
GLUCOSE BLD-MCNC: 83 MG/DL (ref 70–99)
HDLC SERPL-MCNC: 67 MG/DL (ref 40–60)
LDL CHOLESTEROL CALCULATED: 107 MG/DL
POTASSIUM SERPL-SCNC: 4.9 MMOL/L (ref 3.5–5.1)
PRO-BNP: 2207 PG/ML (ref 0–449)
SODIUM BLD-SCNC: 143 MMOL/L (ref 136–145)
TRIGL SERPL-MCNC: 76 MG/DL (ref 0–150)
VLDLC SERPL CALC-MCNC: 15 MG/DL

## 2023-01-23 PROCEDURE — 99214 OFFICE O/P EST MOD 30 MIN: CPT | Performed by: INTERNAL MEDICINE

## 2023-01-23 PROCEDURE — 1123F ACP DISCUSS/DSCN MKR DOCD: CPT | Performed by: INTERNAL MEDICINE

## 2023-02-13 ENCOUNTER — HOSPITAL ENCOUNTER (OUTPATIENT)
Dept: NON INVASIVE DIAGNOSTICS | Age: 88
Discharge: HOME | End: 2023-02-13
Payer: MEDICARE

## 2023-02-13 DIAGNOSIS — I25.5 ISCHEMIC CARDIOMYOPATHY: ICD-10-CM

## 2023-02-13 LAB
LV EF: 33 %
LVEF MODALITY: NORMAL

## 2023-02-13 PROCEDURE — 2580000003 HC RX 258

## 2023-02-13 PROCEDURE — 6360000004 HC RX CONTRAST MEDICATION

## 2023-02-13 PROCEDURE — C8929 TTE W OR WO FOL WCON,DOPPLER: HCPCS

## 2023-02-13 ASSESSMENT — ENCOUNTER SYMPTOMS
GASTROINTESTINAL NEGATIVE: 1
RESPIRATORY NEGATIVE: 1
EYES NEGATIVE: 1

## 2023-02-13 NOTE — PROGRESS NOTES
Patient: Saqib Lisa is a 82 y.o. male who presents today with the following Chief Complaint(s):    Chief Complaint   Patient presents with    Follow-up         HPIHe is here for a checkup. Doing about the same. Denies CP or SOB. H/O cardiomyopathy, ischemic with HFrEF. Treated with Blker  Entresto, aldactone,  SGLT2i, imdur/hydralazine. He wears a life vest. ECHO, LVEF 15 to 20 %. Cardiology f/u. He suffers from mild-moderate dementia. Still functional with ADL. Family takes care of executive functions. Home support system consist of daughter who stays in the home. Wife is home but is challenged from prior stroke. Grandson there also. Son who accompanies him today visits home routinely. Now OT/PT/CNA visit 2 to 3 times per week. Still to see neurology for f/u on dementia. Son notes he is more alert since hospitalization. Sons number is [phone].               Current Outpatient Medications   Medication Sig Dispense Refill    furosemide (LASIX) 40 MG tablet Take 1 tablet by mouth daily as needed (for lower extremity swelling) 180 tablet 1    oxybutynin (DITROPAN-XL) 5 MG extended release tablet TAKE 1 TABLET BY MOUTH DAILY 90 tablet 1    atorvastatin (LIPITOR) 40 MG tablet TAKE 1 TABLET BY MOUTH DAILY FOR CHOLESTEROL 90 tablet 3    furosemide (LASIX) 40 MG tablet Take 1 tablet by mouth daily 60 tablet 3    spironolactone (ALDACTONE) 25 MG tablet Take 1 tablet by mouth daily (Patient taking differently: Take 12.5 mg by mouth daily Take 1/2 tablet daily.) 30 tablet 3    aspirin 81 MG chewable tablet Take 1 tablet by mouth daily 30 tablet 3    sacubitril-valsartan (ENTRESTO) 24-26 MG per tablet Take 1 tablet by mouth 2 times daily (Patient taking differently: Take 1 tablet by mouth 2 times daily Take 1/2 tablet twice a day.) 60 tablet 3    tamsulosin (FLOMAX) 0.4 MG capsule Take 0.4 mg by mouth daily      metoprolol succinate (TOPROL XL) 25 MG extended release tablet TAKE 1 TABLET BY MOUTH DAILY FOR BLOOD PRESSURE AND HEART (Patient taking differently: Take 12.5 mg by mouth daily Take 1/2 tablet daily.) 90 tablet 3     No current facility-administered medications for this visit. Patient's past medical history, surgical history, family history, medications,and allergies  were all reviewed and updated as appropriate today. Review of Systems   Constitutional: Negative. HENT:          Hearing loss. To go to [facility] for hearing aid. Less expensive. Eyes: Negative. Respiratory: Negative. Cardiovascular:         Cardiomyopathy. See HPI. Hypertension, see med list.    Gastrointestinal: Negative. Endocrine:        Hyperlipidemia, treated with statin. Genitourinary: Negative. BPH, takes tamsulosin. Musculoskeletal: Negative. Skin: Negative. Neurological: Negative. Psychiatric/Behavioral:          Dementia, as prior outlined. No change. Physical Exam  Constitutional:       General: He is not in acute distress. Appearance: He is well-developed. HENT:      Head: Normocephalic and atraumatic. Right Ear: External ear normal.      Left Ear: External ear normal.      Nose: Nose normal.   Eyes:      General: No scleral icterus. Conjunctiva/sclera: Conjunctivae normal.      Pupils: Pupils are equal, round, and reactive to light. Neck:      Thyroid: No thyromegaly. Cardiovascular:      Rate and Rhythm: Normal rate and regular rhythm. Heart sounds: Normal heart sounds. Comments: 2/6 holosystolic murmur LSB. Pulmonary:      Effort: Pulmonary effort is normal.      Breath sounds: Normal breath sounds. Abdominal:      General: Bowel sounds are normal.      Palpations: Abdomen is soft. There is no mass. Musculoskeletal:      Cervical back: Normal range of motion and neck supple. Comments: 1+ leg edema. Lymphadenopathy:      Cervical: No cervical adenopathy. Skin:     General: Skin is warm and dry.    Neurological:      Mental Status: He is alert and oriented to person, place, and time.      Deep Tendon Reflexes: Reflexes are normal and symmetric.   Psychiatric:         Behavior: Behavior normal.      Comments: Memory and cognitive challenges. No change.        Vitals:    01/19/23 1332   BP: 118/78   Pulse: 82   SpO2: 98%       Assessment:   Diagnosis Orders   1. Cardiomyopathy, unspecified type (HCC)  Heart healthy lifestyle stressed to patient and son.   Medication compliance.  Cardiology f/u.         2. Primary hypertension  Continue medication regimen.   DASH and some sodium diet discussed.       3. Mild vascular dementia without behavioral disturbance, psychotic disturbance, mood disturbance, or anxiety  System in place.  Planning and organizing stressed.   Proper rest, healthy diet and regular exercise discussed.       4. Hyperlipidemia, unspecified hyperlipidemia type  POCT glycosylated hemoglobin (Hb A1C)    POCT Glucose  Hear healthy diet and statin compliance discussed.       5. Benign prostatic hyperplasia with urinary frequency  Continue Tamsulosin.       6. Bilateral hearing loss, unspecified hearing loss type  Hearing aids.             Plan:  See plans above.

## 2023-02-15 NOTE — TELEPHONE ENCOUNTER
Medication:   Requested Prescriptions     Pending Prescriptions Disp Refills    ENTRESTO 24-26 MG per tablet [Pharmacy Med Name: ENTRESTO 24-26MG TABLETS] 60 tablet 3     Sig: TAKE 1 TABLET BY MOUTH TWICE DAILY        Last Filled:      Patient Phone Number: [phone] (home)     Last appt: 1/19/2023   Next appt: 3/23/2023

## 2023-02-18 NOTE — PROGRESS NOTES
[employer]   Electrophysiology Follow Up   Date: 2/17/2023    CC:  ICM    HPI: Vanessa Serrano is a 82 y.o. male with a past medical history of CAD s/p CABG '09, HTN, and HLD, diagnosed with with new cardiomyopathy (LVEF 15-20%) in November 2022, fitted with life vest. Seen in EP clinic 1/10, on GDMT, ICD was discussed, repeat ECHO was ordered and LVEF remains reduced 30-35%. Maritza Yuen presents today for follow up. Continues to wear life vest. He denies palpitations or racing heart, denies dizziness or LH with activity, denies syncope. Compliant with medication. Review of System:  [x] Full ROS obtained and negative except as mentioned in HPI or below      Prior to Admission medications    Medication Sig Start Date End Date Taking? Authorizing Provider   furosemide (LASIX) 40 MG tablet Take 1 tablet by mouth daily as needed (for lower extremity swelling) 1/10/23   Tj Estrella MD   oxybutynin (DITROPAN-XL) 5 MG extended release tablet TAKE 1 TABLET BY MOUTH DAILY 1/9/23   Greta Miguel MD   atorvastatin (LIPITOR) 40 MG tablet TAKE 1 TABLET BY MOUTH DAILY FOR CHOLESTEROL 1/3/23 4/3/23  Greta Miguel MD   furosemide (LASIX) 40 MG tablet Take 1 tablet by mouth daily 11/11/22   Bita Shirley MD   spironolactone (ALDACTONE) 25 MG tablet Take 1 tablet by mouth daily  Patient taking differently: Take 12.5 mg by mouth daily Take 1/2 tablet daily. 11/11/22   Bita Shirley MD   aspirin 81 MG chewable tablet Take 1 tablet by mouth daily 11/11/22   Bita Shirley MD   sacubitril-valsartan (ENTRESTO) 24-26 MG per tablet Take 1 tablet by mouth 2 times daily  Patient taking differently: Take 1 tablet by mouth 2 times daily Take 1/2 tablet twice a day.  11/10/22   Bita Shirley MD   tamsulosin (FLOMAX) 0.4 MG capsule Take 0.4 mg by mouth daily    Historical Provider, MD   metoprolol succinate (TOPROL XL) 25 MG extended release tablet TAKE 1 TABLET BY MOUTH DAILY FOR BLOOD PRESSURE AND HEART  Patient taking differently: Take 12.5 mg by mouth daily Take 1/2 tablet daily. 9/21/22 1/10/23  Michell Aiken MD       Past Medical History:   Diagnosis Date    Arthritis     Balance disorder     CAD (coronary artery disease)     Chest pain     Dental disease     Dizziness     Dyslipidemia     Fatigue     Hard of hearing     History of prostate cancer     Hypertension     Hyperthyroidism     Impaired memory     SOB (shortness of breath)     Vitamin D deficiency         Past Surgical History:   Procedure Laterality Date    CARDIAC SURGERY         No Known Allergies    Social History:  Reviewed. reports that he quit smoking about 48 years ago. His smoking use included cigarettes. He has never used smokeless tobacco. He reports current alcohol use. He reports that he does not use drugs. Family History:  Reviewed. No family history of SCD. Physical Examination:  Vitals:    03/01/23 1447   BP: 122/70   Pulse: 72        Wt Readings from Last 3 Encounters:   01/23/23 155 lb (70.3 kg)   01/19/23 160 lb (72.6 kg)   01/10/23 160 lb (72.6 kg)     No acute distress  Moist mucosa, nonicteric conjunctiva  Diminished aeration, nonlabored, no rhonchi or wheeze  Heart is regular rate, regular rhythm, no murmurs, 1+ lower extremity swelling, no jugular venous distention  Abdomen is rounded, soft, nontender  Strength is grossly preserved, normal gait, normal tone  No focal neurologic deficits  Appropriate mood and affect  No rashes or ecchymoses    Labs:  Lab Results   Component Value Date    ALT 28 10/12/2022    AST 22 10/12/2022     01/23/2023    K 4.9 01/23/2023    HGB 14.2 11/06/2022     01/23/2023    CREATININE 1.1 01/23/2023    BUN 20 01/23/2023    TSH 0.96 12/15/2011       Imaging:  ECG 2/17/23  SR w/ RBBB, QTcH 459,    Echo 02/13/23   Summary   Left ventricular cavity size is normal. Normal left ventricular wall   thickness. Ejection fraction is visually estimated to be 30-35%.  Moderate global   hypokinesis. Grade I diastolic dysfunction with normal LV filling pressures. Mildly thickened mitral valve leaflets. No mitral stenosis. Mild mitral   regurgitation. RV is not well visualized. Tricuspid valve is structurally normal.   No evidence of tricuspid stenosis. Mild tricuspid regurgitation. Echo 11/07/2022   Left ventricular cavity size is dilated. Normal left ventricular wall   thickness. Overall left ventricular systolic function appears severely   reduced with an ejection fraction of 15-20%. There is severe diffuse   hypokinesis. Diastolic filling parameters suggest grade I diastolic   dysfunction. Mild mitral regurgitation is present. The aortic valve leaflets are slightly thickened /calcified. Mild-to-moderate aortic regurgitation is present. Moderate aortic stenosis with a peak velocity of 3.05m/s and a mean pressure gradient of 23mmHg. Mild tricuspid regurgitation. Estimated pulmonary artery systolic pressure is at 39 mmHg assuming a right atrial pressure of 15 mmHg. The right ventricle appears normal in size but hypokinetic. TAPSE measures 1.11cm and the RVS velocity measures 5.46 cm/s. There is a left pleural effusion. IVC size is dilated (>2.1 cm) and collapses < 50% with respiration consistent with elevated RA pressure (15 mmHg). Stress Test 12/0/2015      Summary    No evidence for ischemia. TID is normal at 0.91    Left ventricular ejection fraction of 52 %.        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 - Dicussed increased risk of SCA/SCD in patients with reduced LVEF < 35%, discussed ICD implantation including its risks and benefits, patient would like to proceed, regular rate, RBBB with normal SC, proceed with single chamber ICD    CAD  - follows with Dr. Spencer Jewell   - s/p CABG 2009   - Cont ASA, Atorvastatin, and Toprol XL    HTN  - Controlled : 122/70  - BP goal <130/80  - Home BP monitoring    BLE Edema   - Ok to take additional lasix for 1 week to improve swelling, if LE swelling perists, contact cardiology      Schedule for single chamber ICD, d/c life vest at that time, additional lasix for swelling    Scribe attestation: This note was scribed in the presence of Tessie Ackerman MD by Grge Mcfarland LPN    Physician Attestation: I, Dr. Tessie Ackerman, confirm that the scribe's documentation has been prepared under my direction and personally reviewed by me in its entirety. I also confirm that the note above accurately reflects all work, treatment, procedures, and medical decision making performed by me. NOTE: This report was transcribed using voice recognition software. Every effort was made to ensure accuracy, however, inadvertent computerized transcription errors may be present.      Tessie Ackerman MD  [address]   Office: [phone]  Fax: [phone]

## 2023-02-23 RX ORDER — SACUBITRIL AND VALSARTAN 24; 26 MG/1; MG/1
TABLET, FILM COATED ORAL
Qty: 30 TABLET | Refills: 3 | Status: SHIPPED | OUTPATIENT
Start: 2023-02-23

## 2023-03-01 ENCOUNTER — OFFICE VISIT (OUTPATIENT)
Dept: CARDIOLOGY CLINIC | Age: 88
End: 2023-03-01
Payer: MEDICARE

## 2023-03-01 VITALS
SYSTOLIC BLOOD PRESSURE: 122 MMHG | HEART RATE: 72 BPM | DIASTOLIC BLOOD PRESSURE: 70 MMHG | WEIGHT: 166.6 LBS | BODY MASS INDEX: 21.39 KG/M2

## 2023-03-01 DIAGNOSIS — R06.02 SHORTNESS OF BREATH: ICD-10-CM

## 2023-03-01 DIAGNOSIS — I50.9 ACUTE CONGESTIVE HEART FAILURE, UNSPECIFIED HEART FAILURE TYPE (HCC): Primary | ICD-10-CM

## 2023-03-01 PROCEDURE — 3078F DIAST BP <80 MM HG: CPT | Performed by: INTERNAL MEDICINE

## 2023-03-01 PROCEDURE — 99214 OFFICE O/P EST MOD 30 MIN: CPT | Performed by: INTERNAL MEDICINE

## 2023-03-01 PROCEDURE — 1123F ACP DISCUSS/DSCN MKR DOCD: CPT | Performed by: INTERNAL MEDICINE

## 2023-03-01 PROCEDURE — 93000 ELECTROCARDIOGRAM COMPLETE: CPT | Performed by: INTERNAL MEDICINE

## 2023-03-01 PROCEDURE — 3074F SYST BP LT 130 MM HG: CPT | Performed by: INTERNAL MEDICINE

## 2023-03-01 NOTE — LETTER
[facility]  EP Procedure Sheet  3/8/23           [id number]  Lizette Tee  1935    Physician: Dr. Kerry Cho    EP Procedures   Pacemaker implant  EP Study    ICD implant  Atrial flutter ablation     Biv implant  Atrial fibrillation ablation    Generator Change  SVT ablation    Lead revision  VT ablation    Lead extraction +/- upgrade  AVN ablation    Loop implant  Cardioversion     Other:   CRICKET     Equipment   Medtronic   KVNG [facility] Scientific  CryoAblation    Biotronik  Laser Lead Extraction    Special Equipment       EP Procedures Scheduling Request  Time Requested     Specific Day    Anesthesia    CT surgery backup    Location ***     Pre-Procedure Labs / Imaging   PT/INR  Type & cross    CBC  Units PRBC    BMP/Mg  Units FFP    Venogram  CXR    Echo  Pulmonary CTA for Pulmonary vein mapping

## 2023-03-01 NOTE — LETTER
[address]  EP Procedure Sheet    3/1/23  Phil Cee  1935  EP Procedures  [] Pacemaker implant (single/dual) [] EP Study   [x] ICD implant (single) [] Atrial flutter ablation (CRICKET Y/N)   [] Biv implant ICD [] Tilt Table   [] Biv implant PPM [] Atrial fibrillation ablation (CRICKET Yes)   [] Generator Change (PPM/ICD/BiV) [] SVT ablation   [] Lead revision (RV/LA/RA) (<1 month) [] PVC ablation     [] Lead extraction +/- upgrade (BiV/PPM/ICD) [] VT Ischemic/ non-ischemic   [] Loop implant/ removal [] VT RVOT   [] Cardioversion [] VT Left sided   [] CRICKET [] AVN ablation   Equipment  [x] [employer]  [] KVNG Mapping System   [] [address] [place] [] [address]   [] [place] [employer] [] CryoAblation   [] Biotronik [] Laser Lead Extraction   EP Procedures Scheduling Request  # hours Requested  []1 [x]2 []2-4 [] 4-6 Scheduled  Date:   Specific Day  Completed    Anesthesia []yes [x]no F/u Date:   CT surgery backup []yes [x]no     Overnight stay      Performing MD []RMM []MXA   []MKW [x] CMV First vs repeat   []1st [] 2nd [] 3rd   Pre-Procedure Labs / Imaging  [] PT/INR [] Type & cross   [] CBC [] Units PRBC   [] BMP/Mg [] Units FFP   [] Venogram [] Cardiac CTA for Pulmonary vein mapping     RN INITIALS: KBLPN   Patient Instructions  Do not eat or drink after midnight the night prior to procedure  Dx:  ICD-10 code:

## 2023-03-01 NOTE — LETTER
March 7, 2023      Michel Phillips MD  [phone] [phone]      Patient: Petra Chatman   MR Number: [id number]   YOB: 1935   Date of Visit: 3/1/2023       Dear Michel Phillips:    Thank you for referring Petra Chatman to me for evaluation/treatment. Below are the relevant portions of my assessment and plan of care. If you have questions, please do not hesitate to call me. I look forward to following Allan Lomeli along with you.     Sincerely,        Rossy Herman MD

## 2023-03-01 NOTE — PATIENT INSTRUCTIONS
You are scheduled for a ICD implant     Our  will call you to discuss a date for you procedure. The Cath Lab will call you a week before your procedure. The night before your procedure you will need to scrub with Hibiclens wash. PRE-PROCEDURE  INSTRUCTIONS   You will need to fast for at least 8 hours prior to your procedure. You may hold your Lasix the morning of the procedure    You may take all other medications with a sip of water the morning of your procedure. Please have a responsible adult to drive you home upon discharge. The discharging unit will be giving you discharge instructions. If you have any questions regarding your procedure itself or your medications, please call [phone] and ask to talk to an EP nurse. You will be seen in the office in 1 week for a wound check and then 3 months following implantation.

## 2023-03-01 NOTE — Clinical Note
March 7, 2023       Jordana Ledesma YOB: 1935   Kris Harris Date of Visit:  3/1/2023       To Whom It May Concern: It is my medical opinion that Jordana Ledesma {Work release (duty restriction):26299}. If you have any questions or concerns, please don't hesitate to call.     Sincerely,        Shruthi Fisher MD

## 2023-03-09 ENCOUNTER — TELEPHONE (OUTPATIENT)
Dept: CARDIOLOGY CLINIC | Age: 88
End: 2023-03-09

## 2023-03-09 NOTE — TELEPHONE ENCOUNTER
LVM for the patient to return call to get scheduled for his procedure.        Procedure- Single Chamber ICD  Date:   Arrival time:  Procedure time:

## 2023-03-13 NOTE — TELEPHONE ENCOUNTER
Spoke with the patients son Cleve Alvarez) and got him scheduled for procedure. We went over the instructions below and he verbalized understanding. Procedure - Single Chamber ICD  Date:  4/21/2023  Arrival time: 10:30 am,   Procedure time 12:00 am     The night before your procedure you will need to scrub with Hibiclens  wash. PRE-PROCEDURE INSTRUCTIONS  You will need to fast for at least 8 hours prior to your procedure. You may hold your Lasix the morning of the procedure  You may take all other medications with a sip of water the morning of your procedure. Please have a responsible adult to drive you home upon discharge. The discharging unit will be giving you discharge instructions. You will be seen in the office in 1 week for a wound check and then 3  months following implantation. If you have any questions regarding your procedure itself or your  medications, please call [phone] and ask to talk to an EP nurse.       Qgenda updated/ alerted cath lab

## 2023-04-21 ENCOUNTER — HOSPITAL ENCOUNTER (OUTPATIENT)
Dept: CARDIAC CATH/INVASIVE PROCEDURES | Age: 88
End: 2023-04-21
Payer: MEDICARE

## 2023-04-21 ENCOUNTER — HOSPITAL ENCOUNTER (OUTPATIENT)
Dept: GENERAL RADIOLOGY | Age: 88
Discharge: HOME | End: 2023-04-21
Payer: MEDICARE

## 2023-04-21 ENCOUNTER — PROCEDURE VISIT (OUTPATIENT)
Dept: CARDIOLOGY CLINIC | Age: 88
End: 2023-04-21

## 2023-04-21 ENCOUNTER — ANESTHESIA EVENT (OUTPATIENT)
Dept: CARDIAC CATH/INVASIVE PROCEDURES | Age: 88
End: 2023-04-21

## 2023-04-21 ENCOUNTER — ANESTHESIA (OUTPATIENT)
Dept: CARDIAC CATH/INVASIVE PROCEDURES | Age: 88
End: 2023-04-21

## 2023-04-21 VITALS — WEIGHT: 165 LBS | HEIGHT: 72 IN | BODY MASS INDEX: 22.35 KG/M2 | TEMPERATURE: 97.5 F

## 2023-04-21 DIAGNOSIS — I42.9 CARDIOMYOPATHY, UNSPECIFIED TYPE (HCC): ICD-10-CM

## 2023-04-21 DIAGNOSIS — Z95.810 CARDIAC DEFIBRILLATOR IN SITU: Primary | ICD-10-CM

## 2023-04-21 LAB
ALBUMIN SERPL-MCNC: 4 G/DL (ref 3.4–5)
ALBUMIN/GLOB SERPL: 1.1 {RATIO} (ref 1.1–2.2)
ALP SERPL-CCNC: 71 U/L (ref 40–129)
ALT SERPL-CCNC: 12 U/L (ref 10–40)
ANION GAP SERPL CALCULATED.3IONS-SCNC: 10 MMOL/L (ref 3–16)
AST SERPL-CCNC: 20 U/L (ref 15–37)
BILIRUB SERPL-MCNC: 0.5 MG/DL (ref 0–1)
BUN SERPL-MCNC: 14 MG/DL (ref 7–20)
CALCIUM SERPL-MCNC: 9.7 MG/DL (ref 8.3–10.6)
CHLORIDE SERPL-SCNC: 104 MMOL/L (ref 99–110)
CO2 SERPL-SCNC: 28 MMOL/L (ref 21–32)
CREAT SERPL-MCNC: 1 MG/DL (ref 0.8–1.3)
DEPRECATED RDW RBC AUTO: 13.3 % (ref 12.4–15.4)
EKG ATRIAL RATE: 50 BPM
EKG DIAGNOSIS: NORMAL
EKG P AXIS: 26 DEGREES
EKG P-R INTERVAL: 164 MS
EKG Q-T INTERVAL: 502 MS
EKG QRS DURATION: 172 MS
EKG QTC CALCULATION (BAZETT): 457 MS
EKG R AXIS: -87 DEGREES
EKG T AXIS: 260 DEGREES
EKG VENTRICULAR RATE: 50 BPM
GFR SERPLBLD CREATININE-BSD FMLA CKD-EPI: >60 ML/MIN/{1.73_M2}
GLUCOSE SERPL-MCNC: 74 MG/DL (ref 70–99)
HCT VFR BLD AUTO: 42.8 % (ref 40.5–52.5)
HGB BLD-MCNC: 13.9 G/DL (ref 13.5–17.5)
INR PPP: 1.02 (ref 0.84–1.16)
MCH RBC QN AUTO: 28.9 PG (ref 26–34)
MCHC RBC AUTO-ENTMCNC: 32.4 G/DL (ref 31–36)
MCV RBC AUTO: 89.2 FL (ref 80–100)
PLATELET # BLD AUTO: 183 K/UL (ref 135–450)
PMV BLD AUTO: 9.4 FL (ref 5–10.5)
POTASSIUM SERPL-SCNC: 4.1 MMOL/L (ref 3.5–5.1)
PROT SERPL-MCNC: 7.5 G/DL (ref 6.4–8.2)
PROTHROMBIN TIME: 13.4 SEC (ref 11.5–14.8)
RBC # BLD AUTO: 4.8 M/UL (ref 4.2–5.9)
SODIUM SERPL-SCNC: 142 MMOL/L (ref 136–145)
WBC # BLD AUTO: 4.2 K/UL (ref 4–11)

## 2023-04-21 PROCEDURE — 33249 INSJ/RPLCMT DEFIB W/LEAD(S): CPT

## 2023-04-21 PROCEDURE — 6360000002 HC RX W HCPCS

## 2023-04-21 PROCEDURE — 2709999900 HC NON-CHARGEABLE SUPPLY

## 2023-04-21 PROCEDURE — C1892 INTRO/SHEATH,FIXED,PEEL-AWAY: HCPCS

## 2023-04-21 PROCEDURE — 80053 COMPREHEN METABOLIC PANEL: CPT

## 2023-04-21 PROCEDURE — 71045 X-RAY EXAM CHEST 1 VIEW: CPT

## 2023-04-21 PROCEDURE — 3700000000 HC ANESTHESIA ATTENDED CARE

## 2023-04-21 PROCEDURE — C1722 AICD, SINGLE CHAMBER: HCPCS

## 2023-04-21 PROCEDURE — 2580000003 HC RX 258: Performed by: NURSE ANESTHETIST, CERTIFIED REGISTERED

## 2023-04-21 PROCEDURE — 33249 INSJ/RPLCMT DEFIB W/LEAD(S): CPT | Performed by: INTERNAL MEDICINE

## 2023-04-21 PROCEDURE — 85610 PROTHROMBIN TIME: CPT

## 2023-04-21 PROCEDURE — 93010 ELECTROCARDIOGRAM REPORT: CPT | Performed by: INTERNAL MEDICINE

## 2023-04-21 PROCEDURE — 6360000002 HC RX W HCPCS: Performed by: NURSE ANESTHETIST, CERTIFIED REGISTERED

## 2023-04-21 PROCEDURE — 3700000001 HC ADD 15 MINUTES (ANESTHESIA)

## 2023-04-21 PROCEDURE — C1894 INTRO/SHEATH, NON-LASER: HCPCS

## 2023-04-21 PROCEDURE — C1777 LEAD, AICD, ENDO SINGLE COIL: HCPCS

## 2023-04-21 PROCEDURE — 93005 ELECTROCARDIOGRAM TRACING: CPT | Performed by: INTERNAL MEDICINE

## 2023-04-21 PROCEDURE — 85027 COMPLETE CBC AUTOMATED: CPT

## 2023-04-21 RX ORDER — PROPOFOL 10 MG/ML
INJECTION, EMULSION INTRAVENOUS CONTINUOUS PRN
Status: DISCONTINUED | OUTPATIENT
Start: 2023-04-21 | End: 2023-04-21 | Stop reason: SDUPTHER

## 2023-04-21 RX ORDER — SODIUM CHLORIDE 9 MG/ML
INJECTION, SOLUTION INTRAVENOUS CONTINUOUS PRN
Status: DISCONTINUED | OUTPATIENT
Start: 2023-04-21 | End: 2023-04-21 | Stop reason: SDUPTHER

## 2023-04-21 RX ORDER — CEFAZOLIN SODIUM 2 G/50ML
SOLUTION INTRAVENOUS PRN
Status: DISCONTINUED | OUTPATIENT
Start: 2023-04-21 | End: 2023-04-21 | Stop reason: SDUPTHER

## 2023-04-21 RX ADMIN — SODIUM CHLORIDE: 9 INJECTION, SOLUTION INTRAVENOUS at 15:16

## 2023-04-21 RX ADMIN — PHENYLEPHRINE HYDROCHLORIDE 100 MCG: 10 INJECTION, SOLUTION INTRAMUSCULAR; INTRAVENOUS; SUBCUTANEOUS at 15:54

## 2023-04-21 RX ADMIN — PROPOFOL 25 MCG/KG/MIN: 10 INJECTION, EMULSION INTRAVENOUS at 15:38

## 2023-04-21 RX ADMIN — CEFAZOLIN SODIUM 2000 MG: 2 SOLUTION INTRAVENOUS at 15:29

## 2023-04-21 ASSESSMENT — ENCOUNTER SYMPTOMS: SHORTNESS OF BREATH: 1

## 2023-04-21 NOTE — ANESTHESIA PRE PROCEDURE
intravenous. Plan discussed with CRNA.     Attending anesthesiologist reviewed and agrees with Atif Marroquin MD   4/21/2023

## 2023-04-21 NOTE — ANESTHESIA POSTPROCEDURE EVALUATION
Department of Anesthesiology  Postprocedure Note    Patient: Love Maradiaga  MRN: [id number]  YOB: 1935  Date of evaluation: 4/21/2023      Procedure Summary     Date: 04/21/23 Room / Location: [facility] Cath Lab    Anesthesia Start: 5495 Anesthesia Stop: 3531    Procedure: ICD Diagnosis:     Scheduled Providers: NICK Amezcua CRNA Responsible Provider: Caty Mendoza MD    Anesthesia Type: general ASA Status: 3          Anesthesia Type: No value filed.     Patti Phase I:      Patti Phase II:        Anesthesia Post Evaluation    Patient location during evaluation: PACU  Patient participation: complete - patient participated  Level of consciousness: awake and alert  Pain score: 0  Airway patency: patent  Nausea & Vomiting: no nausea and no vomiting  Complications: no  Cardiovascular status: hemodynamically stable  Respiratory status: acceptable  Hydration status: euvolemic

## 2023-04-21 NOTE — H&P
[facility], [place], operated by [facility]    Electrophysiology Procedure Note       Date of Procedure: 4/21/2023  Patient's Name: Mariana Singer  YOB: 1935   Medical Record Number: [id number]    Indication:  Primary Prevention ICD implant    Consent:   I have discussed with the patient and/or the patient representative the indication, alternatives, and the possible risks and/or complications of the planned procedure and the anesthesia methods. The patient and/or patient representative appear to understand and agree to proceed. Past Medical History:   Diagnosis Date    Arthritis     Balance disorder     CAD (coronary artery disease)     Chest pain     Dental disease     Dizziness     Dyslipidemia     Fatigue     Hard of hearing     History of prostate cancer     Hypertension     Hyperthyroidism     Impaired memory     SOB (shortness of breath)     Vitamin D deficiency        Past Surgical History:   Procedure Laterality Date    CARDIAC SURGERY         Prior to Admission medications    Medication Sig Start Date End Date Taking? Authorizing Provider   sacubitril-valsartan (ENTRESTO) 24-26 MG per tablet Take 1/2 tablet twice a day. 2/23/23   Bebe Jose MD   furosemide (LASIX) 40 MG tablet Take 1 tablet by mouth daily as needed (for lower extremity swelling) 1/10/23   Fili Em MD   oxybutynin (DITROPAN-XL) 5 MG extended release tablet TAKE 1 TABLET BY MOUTH DAILY 1/9/23   Bebe Jose MD   atorvastatin (LIPITOR) 40 MG tablet TAKE 1 TABLET BY MOUTH DAILY FOR CHOLESTEROL 1/3/23 4/3/23  Bebe Jose MD   furosemide (LASIX) 40 MG tablet Take 1 tablet by mouth daily 11/11/22   Avery Krishnamurthy MD   spironolactone (ALDACTONE) 25 MG tablet Take 1 tablet by mouth daily  Patient taking differently: Take 12.5 mg by mouth daily Take 1/2 tablet daily.  11/11/22   Avery Krishnamurthy MD   aspirin 81 MG chewable tablet Take 1 tablet by mouth daily 11/11/22   Avery Krishnamurthy MD   tamsulosin [facility]) 0.4 MG capsule

## 2023-04-21 NOTE — PROCEDURES
layer using 4-0 vicryl. Steri strips were placed over the incision site. A dressing was over the wound. All sponge and needle counts were reported as correct at the end of the procedure. The patient tolerated the procedure well and there were no complications. Post-sedation evaluation was completed. Patient was transported to the holding area in stable condition.      Lead and device information:             Assessment:  Successful implantation of a single chamber ICD    Plan:  Portable CXR post op  Wound check and device interrogation in 1 week, subsequent device interrogation in 3 months    Lupillo Best MD  [facility]   Office: [phone]  Fax: [phone]

## 2023-04-21 NOTE — DISCHARGE INSTRUCTIONS
[place], [place] office at:  [phone]  Lars Snare MD Jolee Stagers, MD Sharyn Cranker CNP Dorothye Georgi CNP Shelton Nails, RN    Permanent Pacemaker (PPM)   Implantable Cardioverter Defibrillator (ICD)  Care Instructions      Your permanent pacemaker/ICD is a sophisticated piece of electronic equipment and both the wound and the device need special care during your recovery period and into the future. Please use these instructions as guide. If you have any questions please call the office. Wound Care:   Keep the incision site clean and dry for one week. Do not get the incision or bandage wet. You may sponge bathe but DO NOT shower for the first seven days or until after your first follow up office visit. Do not remove the outer dressing   DO NOT apply soaps, ointments,  lotion or powder to the incision site. Wear comfortable clothes that will not rub on the incision site. Avoid bra straps or suspenders. At your one week follow up appointment your bandage will be removed and you will be given further instruction on care of the site at that time. When to contact the office:  Changes in how your incision site is healing including:  Increase in swelling and/or tenderness   Increase in redness at the incision site or surrounding the device  Drainage from the incision  If you experience:  Lightheadedness, dizziness or passing out  Increase in fatigue or shortness of breath  Fever (temperature greater than 100 degrees F)  Chills   Prolonged hiccups or chest discomfort  If you have any questions     Activity:   Do not raise your elbow above shoulder level or reach behind your back for 4 weeks after your procedure. DO NOT lift more than 8 pounds with your affected arm for 4 weeks after your procedure. (A gallon of milk is 8 pounds). Avoid excessive pushing, pulling or twisting. DO NOT drive until instructed it is OK by your provider.   Wear sling for 24 hours

## 2023-04-26 PROBLEM — Z95.810 CARDIAC DEFIBRILLATOR IN SITU: Status: ACTIVE | Noted: 2023-04-26

## 2023-04-28 ENCOUNTER — NURSE ONLY (OUTPATIENT)
Dept: CARDIOLOGY CLINIC | Age: 88
End: 2023-04-28

## 2023-04-28 NOTE — PROGRESS NOTES
Wound check pt's wound on upper left side of chest was dry, steri strips intact, scant dry blood around steri strips, no swelling or redness. Pt reports no pain or problems with incision site. Advised pt to continue to follow the restrictions for lifting and activity. Instructed pt on post wound care instructions, no scrubbing the incision site, ok to let soap and water run over the site, and let steri strips fall off on their own. Advised pt to call office with any future questions or sx's. Pt verbalized understanding.

## 2023-05-08 ENCOUNTER — OFFICE VISIT (OUTPATIENT)
Dept: CARDIOLOGY CLINIC | Age: 88
End: 2023-05-08
Payer: MEDICARE

## 2023-05-08 VITALS
SYSTOLIC BLOOD PRESSURE: 112 MMHG | HEART RATE: 66 BPM | HEIGHT: 74 IN | BODY MASS INDEX: 20.53 KG/M2 | WEIGHT: 160 LBS | DIASTOLIC BLOOD PRESSURE: 70 MMHG

## 2023-05-08 DIAGNOSIS — I10 PRIMARY HYPERTENSION: ICD-10-CM

## 2023-05-08 DIAGNOSIS — I25.10 CORONARY ARTERY DISEASE INVOLVING NATIVE CORONARY ARTERY OF NATIVE HEART WITHOUT ANGINA PECTORIS: Primary | ICD-10-CM

## 2023-05-08 PROCEDURE — 1123F ACP DISCUSS/DSCN MKR DOCD: CPT | Performed by: INTERNAL MEDICINE

## 2023-05-08 PROCEDURE — 3078F DIAST BP <80 MM HG: CPT | Performed by: INTERNAL MEDICINE

## 2023-05-08 PROCEDURE — 99214 OFFICE O/P EST MOD 30 MIN: CPT | Performed by: INTERNAL MEDICINE

## 2023-05-08 PROCEDURE — 3074F SYST BP LT 130 MM HG: CPT | Performed by: INTERNAL MEDICINE

## 2023-05-08 NOTE — PROGRESS NOTES
[address]   Dr Sid Lancaster. Jillian Rodríguez MD, [facility]    Outpatient Follow Up Note    2023,10:24 AM  Subjective:   CHIEF COMPLAINT / HPI:  Follow Up secondary to CAD and post bypass and cardiomyopathy. Ewelina Sauceda is 82 y.o. male who here with his son and has apparent progressive dementia. Follow-up of his CAD and previous interventions. Here for follow-up and has no current issues. 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 Did have a defibrillator implant 2 weeks ago by Dr. Donaldson [facility]. The wound looks good and looks dry. .             Covid pneumonia   CHF ISCHEMIC CARDIOMYOPATHY EF 15-20%  CAD and CABG  TURP for BPH     Past Medical History:    Past Medical History:   Diagnosis Date    Arthritis     Balance disorder     CAD (coronary artery disease)     Chest pain     Dental disease     Dizziness     Dyslipidemia     Fatigue     Hard of hearing     History of prostate cancer     Hypertension     Hyperthyroidism     Impaired memory     SOB (shortness of breath)     Vitamin D deficiency      Past Surgical History  Past Surgical History:   Procedure Laterality Date    CARDIAC SURGERY       Social History:       Social History     Tobacco Use   Smoking Status Former    Types: Cigarettes    Quit date: 1975    Years since quittin.3   Smokeless Tobacco Never     Current Medications:  Prior to Visit Medications    Medication Sig Taking? Authorizing Provider   sacubitril-valsartan (ENTRESTO) 24-26 MG per tablet Take 1/2 tablet twice a day. Yes Meenu Machado MD   oxybutynin (DITROPAN-XL) 5 MG extended release tablet TAKE 1 TABLET BY MOUTH DAILY Yes Meenu Machado MD   furosemide (LASIX) 40 MG tablet Take 1 tablet by mouth daily Yes Bernadette Chowdhury MD   spironolactone (ALDACTONE) 25 MG tablet Take 1 tablet by mouth daily  Patient taking differently: Take 0.5 tablets by mouth daily Take 1/2 tablet daily.  Yes Bernadette Chowdhury MD   aspirin 81 MG chewable

## 2023-05-26 ENCOUNTER — TELEPHONE (OUTPATIENT)
Dept: INTERNAL MEDICINE CLINIC | Age: 88
End: 2023-05-26

## 2023-05-26 RX ORDER — SPIRONOLACTONE 25 MG/1
12.5 TABLET ORAL DAILY
Qty: 30 TABLET | Refills: 3 | Status: SHIPPED | OUTPATIENT
Start: 2023-05-26

## 2023-05-26 NOTE — TELEPHONE ENCOUNTER
Patient son called stated his dad has been out of medication spironolactone for a week. Medication was prescribed while in the hospital and son concerned because his father has just had a pacemaker put in please advise would like a call when this medication has been sent to pharmacy in [place].

## 2023-05-30 RX ORDER — SPIRONOLACTONE 25 MG/1
TABLET ORAL
Qty: 45 TABLET | OUTPATIENT
Start: 2023-05-30

## 2023-06-23 ENCOUNTER — OFFICE VISIT (OUTPATIENT)
Dept: INTERNAL MEDICINE CLINIC | Age: 88
End: 2023-06-23
Payer: MEDICARE

## 2023-06-23 VITALS
HEART RATE: 72 BPM | BODY MASS INDEX: 20.67 KG/M2 | WEIGHT: 156 LBS | OXYGEN SATURATION: 97 % | HEIGHT: 73 IN | SYSTOLIC BLOOD PRESSURE: 128 MMHG | DIASTOLIC BLOOD PRESSURE: 60 MMHG

## 2023-06-23 DIAGNOSIS — I50.22 CHRONIC SYSTOLIC (CONGESTIVE) HEART FAILURE (HCC): ICD-10-CM

## 2023-06-23 DIAGNOSIS — E78.2 MIXED HYPERLIPIDEMIA: ICD-10-CM

## 2023-06-23 DIAGNOSIS — I10 PRIMARY HYPERTENSION: ICD-10-CM

## 2023-06-23 DIAGNOSIS — F03.90 DEMENTIA WITHOUT BEHAVIORAL DISTURBANCE, PSYCHOTIC DISTURBANCE, MOOD DISTURBANCE, OR ANXIETY, UNSPECIFIED DEMENTIA SEVERITY, UNSPECIFIED DEMENTIA TYPE (HCC): ICD-10-CM

## 2023-06-23 DIAGNOSIS — G20 PARKINSON DISEASE (HCC): Primary | ICD-10-CM

## 2023-06-23 DIAGNOSIS — C61 PROSTATE CANCER (HCC): ICD-10-CM

## 2023-06-23 DIAGNOSIS — I42.8 OTHER CARDIOMYOPATHY (HCC): ICD-10-CM

## 2023-06-23 PROCEDURE — 99214 OFFICE O/P EST MOD 30 MIN: CPT | Performed by: INTERNAL MEDICINE

## 2023-06-23 PROCEDURE — 3074F SYST BP LT 130 MM HG: CPT | Performed by: INTERNAL MEDICINE

## 2023-06-23 PROCEDURE — 1123F ACP DISCUSS/DSCN MKR DOCD: CPT | Performed by: INTERNAL MEDICINE

## 2023-06-23 PROCEDURE — 3078F DIAST BP <80 MM HG: CPT | Performed by: INTERNAL MEDICINE

## 2023-07-09 PROBLEM — G20 PARKINSON DISEASE (HCC): Status: ACTIVE | Noted: 2023-07-09

## 2023-07-09 PROBLEM — I50.22 CHRONIC SYSTOLIC (CONGESTIVE) HEART FAILURE (HCC): Status: ACTIVE | Noted: 2023-07-09

## 2023-07-09 PROBLEM — G20.A1 PARKINSON DISEASE: Status: ACTIVE | Noted: 2023-07-09

## 2023-07-09 ASSESSMENT — ENCOUNTER SYMPTOMS
GASTROINTESTINAL NEGATIVE: 1
RESPIRATORY NEGATIVE: 1
EYES NEGATIVE: 1

## 2023-07-11 DIAGNOSIS — N32.81 OAB (OVERACTIVE BLADDER): ICD-10-CM

## 2023-07-12 NOTE — PROGRESS NOTES
History of prostate cancer     Hypertension     Hyperthyroidism     Impaired memory     SOB (shortness of breath)     Vitamin D deficiency         Past Surgical History:   Procedure Laterality Date    CARDIAC SURGERY         No Known Allergies    Social History:  Reviewed. reports that he quit smoking about 48 years ago. His smoking use included cigarettes. He has never used smokeless tobacco. He reports current alcohol use. He reports that he does not use drugs. Family History:  Reviewed. No family history of SCD. Physical Examination:  Vitals:    07/13/23 1006   BP: 112/70   Pulse: 71      Wt Readings from Last 3 Encounters:   06/23/23 156 lb (70.8 kg)   05/08/23 160 lb (72.6 kg)   04/21/23 165 lb (74.8 kg)     No acute distress  Moist mucosa, nonicteric conjunctiva  Clear, nonlabored, no rhonchi or wheeze  Heart is regular rate, regular rhythm, no murmurs, 1+ lower extremity swelling, no jugular venous distention  Abdomen is rounded, soft, nontender  Strength is grossly preserved, gait assisted by cane, normal tone  No focal neurologic deficits  Appropriate mood and affect  No rashes or ecchymoses    Labs:  Lab Results   Component Value Date    ALT 12 04/21/2023    AST 20 04/21/2023     04/21/2023    K 4.1 04/21/2023    HGB 13.9 04/21/2023     04/21/2023    CREATININE 1.0 04/21/2023    BUN 14 04/21/2023    TSH 0.96 12/15/2011    INR 1.02 04/21/2023       Imaging:  ECG 7/13/2023  Sinus rhythm, QTcH 476,    Echo 02/13/23   Summary   Left ventricular cavity size is normal. Normal left ventricular wall   thickness. Ejection fraction is visually estimated to be 30-35%. Moderate global   hypokinesis. Grade I diastolic dysfunction with normal LV filling pressures. Mildly thickened mitral valve leaflets. No mitral stenosis. Mild mitral   regurgitation. RV is not well visualized. Tricuspid valve is structurally normal.   No evidence of tricuspid stenosis.    Mild tricuspid

## 2023-07-13 ENCOUNTER — OFFICE VISIT (OUTPATIENT)
Dept: CARDIOLOGY CLINIC | Age: 88
End: 2023-07-13

## 2023-07-13 ENCOUNTER — NURSE ONLY (OUTPATIENT)
Dept: CARDIOLOGY CLINIC | Age: 88
End: 2023-07-13
Payer: MEDICARE

## 2023-07-13 VITALS
WEIGHT: 156 LBS | DIASTOLIC BLOOD PRESSURE: 70 MMHG | SYSTOLIC BLOOD PRESSURE: 112 MMHG | BODY MASS INDEX: 20.58 KG/M2 | HEART RATE: 71 BPM

## 2023-07-13 DIAGNOSIS — Z95.810 CARDIAC DEFIBRILLATOR IN SITU: Primary | ICD-10-CM

## 2023-07-13 DIAGNOSIS — I50.22 CHRONIC SYSTOLIC (CONGESTIVE) HEART FAILURE (HCC): ICD-10-CM

## 2023-07-13 DIAGNOSIS — R06.02 SHORTNESS OF BREATH: ICD-10-CM

## 2023-07-13 DIAGNOSIS — I50.9 ACUTE CONGESTIVE HEART FAILURE, UNSPECIFIED HEART FAILURE TYPE (HCC): Primary | ICD-10-CM

## 2023-07-13 PROCEDURE — 93290 INTERROG DEV EVAL ICPMS IP: CPT | Performed by: INTERNAL MEDICINE

## 2023-07-13 PROCEDURE — 93282 PRGRMG EVAL IMPLANTABLE DFB: CPT | Performed by: INTERNAL MEDICINE

## 2023-07-13 RX ORDER — OXYBUTYNIN CHLORIDE 5 MG/1
5 TABLET, EXTENDED RELEASE ORAL DAILY
Qty: 90 TABLET | Refills: 1 | Status: SHIPPED | OUTPATIENT
Start: 2023-07-13

## 2023-07-13 NOTE — PROGRESS NOTES
Patient comes in for programming evaluation for a 3 month programming evaluation and Medtronic FCA recommendations on their Medtronic SC ICD. Remote monitoring active with last transmission occurring on 06.12.2023. (optivol)    All sensing and pacing parameters are within normal range. 13.6 Years estimated until NATHALIE/RRT.  <0.1%. PVC 47.4/hr  1 NSVT noted on 05.23.2023 x <02 secs. Patient remains on asa 81 mg, furosemide, metoprolol, entresto. MDT FCA 5.11.2023-Recommend programming ALL HV therapy pathways B>AX. Already programmed correctly. Carelink, time and shock alerts adjusted today. Please see interrogation for more detail. Optivol is within normal range. Patient will see Dr. Sendy Horvtah today. We will continue to monitor remotely.

## 2023-09-07 ENCOUNTER — OFFICE VISIT (OUTPATIENT)
Dept: CARDIOLOGY CLINIC | Age: 88
End: 2023-09-07

## 2023-09-07 VITALS
BODY MASS INDEX: 20.71 KG/M2 | HEART RATE: 76 BPM | SYSTOLIC BLOOD PRESSURE: 105 MMHG | WEIGHT: 157 LBS | DIASTOLIC BLOOD PRESSURE: 65 MMHG

## 2023-09-07 DIAGNOSIS — Z95.810 CARDIAC DEFIBRILLATOR IN SITU: Primary | ICD-10-CM

## 2023-09-07 DIAGNOSIS — E78.5 HYPERLIPIDEMIA LDL GOAL <70: ICD-10-CM

## 2023-09-07 DIAGNOSIS — I25.10 CORONARY ARTERY DISEASE INVOLVING NATIVE CORONARY ARTERY OF NATIVE HEART WITHOUT ANGINA PECTORIS: ICD-10-CM

## 2023-09-07 DIAGNOSIS — I42.9 CARDIOMYOPATHY, UNSPECIFIED TYPE (HCC): ICD-10-CM

## 2023-09-07 NOTE — PROGRESS NOTES
components found for: CHLPL  Lab Results   Component Value Date    TRIG 85 03/23/2023    TRIG 76 01/23/2023    TRIG 71 10/12/2022     Lab Results   Component Value Date    HDL 61 (H) 03/23/2023    HDL 67 (H) 01/23/2023    HDL 57 10/12/2022     Lab Results   Component Value Date    LDLCALC 94 03/23/2023    LDLCALC 107 (H) 01/23/2023    LDLCALC 124 (H) 10/12/2022     Lab Results   Component Value Date    LABVLDL 17 03/23/2023    LABVLDL 15 01/23/2023    LABVLDL 14 10/12/2022     Radiology Review:  Pertinent images / reports were reviewed as a part of this visit and reveals the following:    Echo:   Summary 11/7/22   Left ventricular cavity size is dilated. Normal left ventricular wall   thickness. Overall left ventricular systolic function appears severely   reduced with an ejection fraction of 15-20%. There is severe diffuse   hypokinesis. Diastolic filling parameters suggest grade I diastolic   dysfunction. Mild mitral regurgitation is present. The aortic valve leaflets   are slightly thickened /calcified. Mild-to-moderate aortic regurgitation is   present. Moderate aortic stenosis with a peak velocity of 3.05m/s and a mean   pressure gradient of 23mmHg. Mild tricuspid regurgitation. Estimated   pulmonary artery systolic pressure is at 39 mmHg assuming a right atrial   pressure of 15 mmHg. The right ventricle appears normal in size but   hypokinetic. TAPSE measures 1.11cm and the RVS velocity measures 5.46 cm/s. There is a left pleural effusion. IVC size is dilated (>2.1 cm) and   collapses < 50% with respiration consistent with elevated RA pressure (15   mmHg). Stress Test / Angiogram:    ECG: On 11/4/2022 sinus rhythm 92/min. Right bundle branch block    . Nonspecific ST and T wave changes. EKG on 12/19/2022 sinus rhythm 66/min. Right bundle branch block. Nonspecific T wave changes. Left axis deviation. This patient was educated using the patient point room wall mount device.  Absence from smokers and

## 2023-10-06 NOTE — DISCHARGE INSTRUCTIONS
Extra Heart Failure sites:   https://[employer].RETAIL PRO/ --- this is American Heart Association interactive Healthier Living with Heart Failure guidebook. Please copy and paste link into search bar. Use your mouse to scroll through the pages. Lots and lots of info / tips    [employer] candy  --- free smart phone candy available for [employer] and [employer]. Use your phone to track sodium / fluid intake,  symptoms, weight, etc.    [employer]. org - website-- [employer] is a dialysis company. All dialysis patients follow a renal diet which IS low sodium!! This website offers free seasonal cookbooks.   Each quarter, they will release 25-30 new recipes with a breakdown of calories, sodium, glucose, etc    [email or web address] -- more free recipes Spontaneous, unlabored and symmetrical

## 2023-10-20 ENCOUNTER — OFFICE VISIT (OUTPATIENT)
Dept: INTERNAL MEDICINE CLINIC | Age: 88
End: 2023-10-20
Payer: MEDICARE

## 2023-10-20 VITALS — WEIGHT: 156 LBS | DIASTOLIC BLOOD PRESSURE: 70 MMHG | SYSTOLIC BLOOD PRESSURE: 110 MMHG | BODY MASS INDEX: 20.58 KG/M2

## 2023-10-20 DIAGNOSIS — F02.80 DEMENTIA ASSOCIATED WITH PARKINSON'S DISEASE (HCC): ICD-10-CM

## 2023-10-20 DIAGNOSIS — N40.1 BENIGN PROSTATIC HYPERPLASIA WITH URINARY FREQUENCY: ICD-10-CM

## 2023-10-20 DIAGNOSIS — R35.0 BENIGN PROSTATIC HYPERPLASIA WITH URINARY FREQUENCY: ICD-10-CM

## 2023-10-20 DIAGNOSIS — E78.2 MIXED HYPERLIPIDEMIA: ICD-10-CM

## 2023-10-20 DIAGNOSIS — G20 DEMENTIA ASSOCIATED WITH PARKINSON'S DISEASE (HCC): ICD-10-CM

## 2023-10-20 DIAGNOSIS — I42.8 OTHER CARDIOMYOPATHY (HCC): ICD-10-CM

## 2023-10-20 DIAGNOSIS — Z23 FLU VACCINE NEED: ICD-10-CM

## 2023-10-20 DIAGNOSIS — I10 ESSENTIAL HYPERTENSION: ICD-10-CM

## 2023-10-20 DIAGNOSIS — H91.93 BILATERAL HEARING LOSS, UNSPECIFIED HEARING LOSS TYPE: ICD-10-CM

## 2023-10-20 DIAGNOSIS — I10 PRIMARY HYPERTENSION: ICD-10-CM

## 2023-10-20 PROCEDURE — 90694 VACC AIIV4 NO PRSRV 0.5ML IM: CPT | Performed by: INTERNAL MEDICINE

## 2023-10-20 PROCEDURE — 1123F ACP DISCUSS/DSCN MKR DOCD: CPT | Performed by: INTERNAL MEDICINE

## 2023-10-20 PROCEDURE — G0008 ADMIN INFLUENZA VIRUS VAC: HCPCS | Performed by: INTERNAL MEDICINE

## 2023-10-20 PROCEDURE — 99214 OFFICE O/P EST MOD 30 MIN: CPT | Performed by: INTERNAL MEDICINE

## 2023-10-20 NOTE — PROGRESS NOTES
Patient: Fabienne eHrnandez is a 82 y.o. male who presents today with the following Chief Complaint(s):    Chief Complaint   Patient presents with    Follow-up         HPIhearing, peeing. Balance fair. 1/2 to 1+ lower leg, ankle. 2/6 SM, LSB. Current Outpatient Medications   Medication Sig Dispense Refill    oxybutynin (DITROPAN-XL) 5 MG extended release tablet TAKE 1 TABLET BY MOUTH DAILY 90 tablet 1    spironolactone (ALDACTONE) 25 MG tablet Take 0.5 tablets by mouth daily 30 tablet 3    sacubitril-valsartan (ENTRESTO) 24-26 MG per tablet Take 1/2 tablet twice a day. 30 tablet 3    furosemide (LASIX) 40 MG tablet Take 1 tablet by mouth daily 60 tablet 3    aspirin 81 MG chewable tablet Take 1 tablet by mouth daily 30 tablet 3    tamsulosin (FLOMAX) 0.4 MG capsule Take 1 capsule by mouth daily      atorvastatin (LIPITOR) 40 MG tablet TAKE 1 TABLET BY MOUTH DAILY FOR CHOLESTEROL 90 tablet 3    metoprolol succinate (TOPROL XL) 25 MG extended release tablet TAKE 1 TABLET BY MOUTH DAILY FOR BLOOD PRESSURE AND HEART (Patient taking differently: Take 0.5 tablets by mouth daily Take 1/2 tablet daily.) 90 tablet 3     No current facility-administered medications for this visit. Patient's past medical history, surgical history, family history, medications,and allergies  were all reviewed and updated as appropriate today. Review of Systems      Physical Exam    Vitals:    10/20/23 1300   BP: 110/70       Assessment:  No diagnosis found. Plan:  There are no diagnoses linked to this encounter.

## 2023-10-25 RX ORDER — METOPROLOL SUCCINATE 25 MG/1
25 TABLET, EXTENDED RELEASE ORAL DAILY
Qty: 90 TABLET | Refills: 3 | Status: SHIPPED | OUTPATIENT
Start: 2023-10-25 | End: 2024-01-23

## 2023-10-29 ASSESSMENT — ENCOUNTER SYMPTOMS
GASTROINTESTINAL NEGATIVE: 1
EYES NEGATIVE: 1
RESPIRATORY NEGATIVE: 1

## 2023-11-02 DIAGNOSIS — E78.00 PURE HYPERCHOLESTEROLEMIA: ICD-10-CM

## 2023-11-03 RX ORDER — TAMSULOSIN HYDROCHLORIDE 0.4 MG/1
0.4 CAPSULE ORAL DAILY
Qty: 90 CAPSULE | Refills: 3 | Status: SHIPPED | OUTPATIENT
Start: 2023-11-03

## 2023-11-03 RX ORDER — ATORVASTATIN CALCIUM 40 MG/1
40 TABLET, FILM COATED ORAL DAILY
Qty: 90 TABLET | Refills: 3 | Status: SHIPPED | OUTPATIENT
Start: 2023-11-03 | End: 2024-02-01

## 2023-12-11 PROCEDURE — 93297 REM INTERROG DEV EVAL ICPMS: CPT | Performed by: NURSE PRACTITIONER

## 2023-12-11 PROCEDURE — G2066 INTER DEVC REMOTE 30D: HCPCS | Performed by: NURSE PRACTITIONER

## 2023-12-21 PROCEDURE — 93295 DEV INTERROG REMOTE 1/2/MLT: CPT | Performed by: INTERNAL MEDICINE

## 2023-12-21 PROCEDURE — 93296 REM INTERROG EVL PM/IDS: CPT | Performed by: INTERNAL MEDICINE

## 2023-12-26 RX ORDER — SACUBITRIL AND VALSARTAN 24; 26 MG/1; MG/1
TABLET, FILM COATED ORAL
Qty: 30 TABLET | Refills: 3 | Status: SHIPPED | OUTPATIENT
Start: 2023-12-26

## 2023-12-26 NOTE — TELEPHONE ENCOUNTER
Medication:   Requested Prescriptions     Pending Prescriptions Disp Refills    sacubitril-valsartan (ENTRESTO) 24-26 MG per tablet [Pharmacy Med Name: ENTRESTO 24-26MG TABLETS] 30 tablet 3     Sig: TAKE 1/2 TABLET BY MOUTH TWICE DAILY        Last Filled:  02/23/23    Patient Phone Number: [phone] (home)     Last appt: 10/20/2023   Next appt: 1/23/2024

## 2024-01-04 NOTE — PROGRESS NOTES
[facility]   Electrophysiology  Office Visit  Date: 1/11/2024    Chief Complaint   Patient presents with    Device Check    Hypertension    Cardiomyopathy    Coronary Artery Disease    Congestive Heart Failure       Cardiac HX: Harjinder Mcgee Jr is a 88 y.o. man with h/o HTN, HLD, dementia, CAD s/p CABG (2009), ICMP, EF 15-20% (2022), Life vest, GDMT, Echo (2/13/2023) EF 30-35%, s/p single chamber MDT ICD (4/21/2023, Dr. Arenas).    Interval History/HPI: Patient is here for follow up for CMP, HTN, ICD management. Patient with longstanding history of CAD dating back to 2009. He underwent coronary artery bypass surgery in 2009, with 4 bypass grafts. He present with dizziness and shortness of breath in November 2022. He stated the dizziness had been going on for months, but was getting progressively worse. BNP was elevated to 8325. Chest xray showed bilateral pleural effusions. Echo at that time showed EF 15-20%, moderate global hypokinesis, grade I diastolic dysfunction, mod AS, mild MR and mild TR. He was discharged with Life vest, Entresto, aldactone, and Toprol. Up titration of heart failure medications limited d/t hypotension. Repeat echo (2/2023) showed EF 30-35%, moderate global hypokinesis, grade I diastolic dysfunction, mod AS, mild MR and mild TR. Patient underwent placement of single chamber ICD (4/21/2023) with Dr. Arenas. Device check (7/13/2023) showed 1 NSVT episode on 5/23/2023, that lasted < 2 seconds. Remote device check (12/2023) showed 1 NSVT episode on 12/9/2023, 7 beats in length lasting < 2 seconds.   Today he presents in NSR, HR 68. Review of his device today shows less than 0.1 % , no arrhythmias, other parameters stable. His estimated device longevity is 13.2 years. On previous remote check of his device he had a NSVT episode in November 2023 lasting about 2 second. Prior to that he had a NSVT episode in May 2023 lasting less than 2 seconds. He has not felt any heart racing,

## 2024-01-11 ENCOUNTER — OFFICE VISIT (OUTPATIENT)
Dept: CARDIOLOGY CLINIC | Age: 89
End: 2024-01-11

## 2024-01-11 ENCOUNTER — NURSE ONLY (OUTPATIENT)
Dept: CARDIOLOGY CLINIC | Age: 89
End: 2024-01-11

## 2024-01-11 VITALS
DIASTOLIC BLOOD PRESSURE: 68 MMHG | HEART RATE: 68 BPM | WEIGHT: 160.8 LBS | BODY MASS INDEX: 21.22 KG/M2 | SYSTOLIC BLOOD PRESSURE: 118 MMHG

## 2024-01-11 DIAGNOSIS — I50.20 HFREF (HEART FAILURE WITH REDUCED EJECTION FRACTION) (HCC): ICD-10-CM

## 2024-01-11 DIAGNOSIS — Z95.810 CARDIAC DEFIBRILLATOR IN SITU: Primary | ICD-10-CM

## 2024-01-11 DIAGNOSIS — I50.22 CHRONIC SYSTOLIC (CONGESTIVE) HEART FAILURE (HCC): ICD-10-CM

## 2024-01-11 DIAGNOSIS — I25.10 CORONARY ARTERY DISEASE INVOLVING NATIVE CORONARY ARTERY OF NATIVE HEART WITHOUT ANGINA PECTORIS: ICD-10-CM

## 2024-01-11 DIAGNOSIS — I47.29 NSVT (NONSUSTAINED VENTRICULAR TACHYCARDIA) (HCC): ICD-10-CM

## 2024-01-11 DIAGNOSIS — Z95.810 IMPLANTABLE CARDIOVERTER-DEFIBRILLATOR (ICD) IN SITU: ICD-10-CM

## 2024-01-11 DIAGNOSIS — I25.5 ISCHEMIC CARDIOMYOPATHY: Primary | ICD-10-CM

## 2024-01-11 DIAGNOSIS — I10 BENIGN ESSENTIAL HTN: ICD-10-CM

## 2024-01-11 PROCEDURE — 93297 REM INTERROG DEV EVAL ICPMS: CPT | Performed by: NURSE PRACTITIONER

## 2024-01-11 NOTE — PATIENT INSTRUCTIONS
PLEASE CALL [facility] SCHEDULING [phone] TO MAKE AN APPOINTMENT FOR YOUR TESTING - stress test

## 2024-01-25 RX ORDER — SPIRONOLACTONE 25 MG/1
12.5 TABLET ORAL DAILY
Qty: 30 TABLET | Refills: 3 | Status: SHIPPED | OUTPATIENT
Start: 2024-01-25

## 2024-01-25 NOTE — TELEPHONE ENCOUNTER
Medication:   Requested Prescriptions     Pending Prescriptions Disp Refills    spironolactone (ALDACTONE) 25 MG tablet [Pharmacy Med Name: SPIRONOLACTONE 25MG TABLETS] 30 tablet 3     Sig: TAKE 1/2 TABLET BY MOUTH DAILY        Last Filled: 5/26/23     Last appt: 10/20/2023   Next appt: 2/14/2024    Last OARRS:        No data to display

## 2024-02-02 RX ORDER — FUROSEMIDE 40 MG/1
TABLET ORAL
Qty: 90 TABLET | Refills: 1 | Status: SHIPPED | OUTPATIENT
Start: 2024-02-02

## 2024-02-07 DIAGNOSIS — N32.81 OAB (OVERACTIVE BLADDER): ICD-10-CM

## 2024-02-08 RX ORDER — OXYBUTYNIN CHLORIDE 5 MG/1
5 TABLET, EXTENDED RELEASE ORAL DAILY
Qty: 90 TABLET | Refills: 1 | Status: SHIPPED | OUTPATIENT
Start: 2024-02-08

## 2024-02-08 NOTE — TELEPHONE ENCOUNTER
Medication:   Requested Prescriptions     Pending Prescriptions Disp Refills    oxyBUTYnin (DITROPAN-XL) 5 MG extended release tablet [Pharmacy Med Name: OXYBUTYNIN ER 5MG TABLETS] 90 tablet 1     Sig: TAKE 1 TABLET BY MOUTH DAILY        Last Filled:      Patient Phone Number: [phone] (home)     Last appt: 10/20/2023   Next appt: 2/14/2024    Last OARRS:        No data to display

## 2024-02-11 PROCEDURE — 93297 REM INTERROG DEV EVAL ICPMS: CPT | Performed by: NURSE PRACTITIONER

## 2024-03-12 ENCOUNTER — OFFICE VISIT (OUTPATIENT)
Dept: INTERNAL MEDICINE CLINIC | Age: 89
End: 2024-03-12
Payer: MEDICARE

## 2024-03-12 VITALS
DIASTOLIC BLOOD PRESSURE: 66 MMHG | BODY MASS INDEX: 20.94 KG/M2 | HEIGHT: 73 IN | HEART RATE: 63 BPM | OXYGEN SATURATION: 98 % | SYSTOLIC BLOOD PRESSURE: 114 MMHG | WEIGHT: 158 LBS

## 2024-03-12 DIAGNOSIS — Z12.5 PROSTATE CANCER SCREENING: ICD-10-CM

## 2024-03-12 DIAGNOSIS — E78.2 MIXED HYPERLIPIDEMIA: ICD-10-CM

## 2024-03-12 DIAGNOSIS — G20.A1 MODERATE DEMENTIA DUE TO PARKINSON'S DISEASE, WITHOUT BEHAVIORAL DISTURBANCE, PSYCHOTIC DISTURBANCE, MOOD DISTURBANCE, OR ANXIETY (HCC): ICD-10-CM

## 2024-03-12 DIAGNOSIS — I25.10 CORONARY ARTERY DISEASE INVOLVING NATIVE CORONARY ARTERY OF NATIVE HEART WITHOUT ANGINA PECTORIS: ICD-10-CM

## 2024-03-12 DIAGNOSIS — N40.1 BENIGN PROSTATIC HYPERPLASIA WITH URINARY FREQUENCY: ICD-10-CM

## 2024-03-12 DIAGNOSIS — I10 PRIMARY HYPERTENSION: Primary | ICD-10-CM

## 2024-03-12 DIAGNOSIS — G20.B1 PARKINSON'S DISEASE WITH DYSKINESIA, UNSPECIFIED WHETHER MANIFESTATIONS FLUCTUATE: ICD-10-CM

## 2024-03-12 DIAGNOSIS — F02.B0 MODERATE DEMENTIA DUE TO PARKINSON'S DISEASE, WITHOUT BEHAVIORAL DISTURBANCE, PSYCHOTIC DISTURBANCE, MOOD DISTURBANCE, OR ANXIETY (HCC): ICD-10-CM

## 2024-03-12 DIAGNOSIS — R35.0 BENIGN PROSTATIC HYPERPLASIA WITH URINARY FREQUENCY: ICD-10-CM

## 2024-03-12 DIAGNOSIS — H90.3 SENSORINEURAL HEARING LOSS (SNHL) OF BOTH EARS: ICD-10-CM

## 2024-03-12 PROCEDURE — 99214 OFFICE O/P EST MOD 30 MIN: CPT | Performed by: INTERNAL MEDICINE

## 2024-03-12 PROCEDURE — 1123F ACP DISCUSS/DSCN MKR DOCD: CPT | Performed by: INTERNAL MEDICINE

## 2024-03-12 ASSESSMENT — PATIENT HEALTH QUESTIONNAIRE - PHQ9
SUM OF ALL RESPONSES TO PHQ QUESTIONS 1-9: 0
2. FEELING DOWN, DEPRESSED OR HOPELESS: NOT AT ALL
1. LITTLE INTEREST OR PLEASURE IN DOING THINGS: NOT AT ALL
SUM OF ALL RESPONSES TO PHQ9 QUESTIONS 1 & 2: 0

## 2024-03-12 NOTE — PATIENT INSTRUCTIONS
Needs hearing aids.     Advise not driving.     Use cane or walker when ambulating.     Continue same medication regimen.    Lab test ordered. Please get these completed.

## 2024-03-21 PROCEDURE — 93296 REM INTERROG EVL PM/IDS: CPT | Performed by: INTERNAL MEDICINE

## 2024-03-21 PROCEDURE — 93295 DEV INTERROG REMOTE 1/2/MLT: CPT | Performed by: INTERNAL MEDICINE

## 2024-03-23 ASSESSMENT — ENCOUNTER SYMPTOMS
EYES NEGATIVE: 1
GASTROINTESTINAL NEGATIVE: 1
RESPIRATORY NEGATIVE: 1

## 2024-03-23 NOTE — PROGRESS NOTES
Patient: Harjinder Mcgee Jr is a 88 y.o. male who presents today with the following Chief Complaint(s):    Chief Complaint   Patient presents with    Hypertension     Follow up     Hyperlipidemia     Follow up          HPIforget a lot, pee a lot and is off balance a lot.   No CP or SOB.   Drives from [place] [employer], at times to [employer].  Appetite.   Sleeps good but has not go a lot. Uses depends if gets in a tight.   Current Outpatient Medications   Medication Sig Dispense Refill    oxyBUTYnin (DITROPAN-XL) 5 MG extended release tablet TAKE 1 TABLET BY MOUTH DAILY 90 tablet 1    furosemide (LASIX) 40 MG tablet TAKE 1 TABLET BY MOUTH DAILY AS NEEDED FOR LOWER EXTREMITY SWELLING 90 tablet 1    spironolactone (ALDACTONE) 25 MG tablet TAKE 1/2 TABLET BY MOUTH DAILY 30 tablet 3    sacubitril-valsartan (ENTRESTO) 24-26 MG per tablet TAKE 1/2 TABLET BY MOUTH TWICE DAILY 30 tablet 3    atorvastatin (LIPITOR) 40 MG tablet TAKE 1 TABLET BY MOUTH DAILY FOR CHOLESTEROL 90 tablet 3    tamsulosin (FLOMAX) 0.4 MG capsule TAKE 1 CAPSULE BY MOUTH DAILY 90 capsule 3    metoprolol succinate (TOPROL XL) 25 MG extended release tablet TAKE 1 TABLET BY MOUTH DAILY FOR BLOOD PRESSURE AND HEART 90 tablet 3    aspirin 81 MG chewable tablet Take 1 tablet by mouth daily 30 tablet 3     No current facility-administered medications for this visit.       Patient's past medical history, surgical history, family history, medications,and allergies  were all reviewed and updated as appropriate today.      Review of Systems      Physical Exam    Vitals:    03/12/24 1242   BP: 114/66   Pulse: 63   SpO2: 98%       Assessment:  No diagnosis found.    Plan:  There are no diagnoses linked to this encounter.  
tablet 3    aspirin 81 MG chewable tablet Take 1 tablet by mouth daily 30 tablet 3     No current facility-administered medications for this visit.       Patient's past medical history, surgical history, family history, medications,and allergies  were all reviewed and updated as appropriate today.      Review of Systems   Constitutional: Negative.    HENT:          Hearing loss. To go to [facility] for hearing aid. Less expensive.    Eyes: Negative.    Respiratory: Negative.     Cardiovascular:         Cardiomyopathy. See HPI.     Hypertension, see med list.    Gastrointestinal: Negative.    Endocrine:        Hyperlipidemia, treated with statin.    Genitourinary: Negative.         BPH, takes tamsulosin.    Musculoskeletal: Negative.    Skin: Negative.    Neurological:         H/O parkinson's. Motor skills seen stable.    Psychiatric/Behavioral:          Dementia, as prior outlined. No change.          Physical Exam  Constitutional:       General: He is not in acute distress.     Appearance: He is well-developed.   HENT:      Head: Normocephalic and atraumatic.      Right Ear: External ear normal.      Left Ear: External ear normal.      Nose: Nose normal.   Eyes:      General: No scleral icterus.     Conjunctiva/sclera: Conjunctivae normal.      Pupils: Pupils are equal, round, and reactive to light.   Neck:      Thyroid: No thyromegaly.   Cardiovascular:      Rate and Rhythm: Normal rate and regular rhythm.      Heart sounds: Normal heart sounds.      Comments: 2/6 holosystolic murmur LSB.   Pulmonary:      Effort: Pulmonary effort is normal.      Breath sounds: Normal breath sounds.   Abdominal:      General: Bowel sounds are normal.      Palpations: Abdomen is soft. There is no mass.   Musculoskeletal:      Cervical back: Normal range of motion and neck supple.      Comments: 1+ leg edema.    Lymphadenopathy:      Cervical: No cervical adenopathy.   Skin:     General: Skin is warm and dry.   Neurological:      Mental

## 2024-03-28 DIAGNOSIS — E78.2 MIXED HYPERLIPIDEMIA: ICD-10-CM

## 2024-03-28 DIAGNOSIS — Z12.5 PROSTATE CANCER SCREENING: ICD-10-CM

## 2024-03-28 DIAGNOSIS — I10 PRIMARY HYPERTENSION: ICD-10-CM

## 2024-03-29 LAB
ALBUMIN SERPL-MCNC: 4.1 G/DL (ref 3.4–5)
ALBUMIN/GLOB SERPL: 1.5 {RATIO} (ref 1.1–2.2)
ALP SERPL-CCNC: 68 U/L (ref 40–129)
ALT SERPL-CCNC: 9 U/L (ref 10–40)
ANION GAP SERPL CALCULATED.3IONS-SCNC: 14 MMOL/L (ref 3–16)
AST SERPL-CCNC: 15 U/L (ref 15–37)
BASOPHILS # BLD: 0 K/UL (ref 0–0.2)
BASOPHILS NFR BLD: 0.9 %
BILIRUB SERPL-MCNC: 0.4 MG/DL (ref 0–1)
BILIRUB UR QL STRIP.AUTO: NEGATIVE
BUN SERPL-MCNC: 12 MG/DL (ref 7–20)
CALCIUM SERPL-MCNC: 9.2 MG/DL (ref 8.3–10.6)
CHLORIDE SERPL-SCNC: 103 MMOL/L (ref 99–110)
CHOLEST SERPL-MCNC: 172 MG/DL (ref 0–199)
CLARITY UR: CLEAR
CO2 SERPL-SCNC: 28 MMOL/L (ref 21–32)
COLOR UR: YELLOW
CREAT SERPL-MCNC: 0.9 MG/DL (ref 0.8–1.3)
DEPRECATED RDW RBC AUTO: 14.1 % (ref 12.4–15.4)
EOSINOPHIL # BLD: 0.2 K/UL (ref 0–0.6)
EOSINOPHIL NFR BLD: 3.1 %
GFR SERPLBLD CREATININE-BSD FMLA CKD-EPI: 83 ML/MIN/{1.73_M2}
GLUCOSE SERPL-MCNC: 77 MG/DL (ref 70–99)
GLUCOSE UR STRIP.AUTO-MCNC: NEGATIVE MG/DL
HCT VFR BLD AUTO: 39.4 % (ref 40.5–52.5)
HDLC SERPL-MCNC: 71 MG/DL (ref 40–60)
HGB BLD-MCNC: 13 G/DL (ref 13.5–17.5)
HGB UR QL STRIP.AUTO: NEGATIVE
KETONES UR STRIP.AUTO-MCNC: NEGATIVE MG/DL
LDLC SERPL CALC-MCNC: 92 MG/DL
LEUKOCYTE ESTERASE UR QL STRIP.AUTO: NEGATIVE
LYMPHOCYTES # BLD: 1.9 K/UL (ref 1–5.1)
LYMPHOCYTES NFR BLD: 36.6 %
MCH RBC QN AUTO: 28.8 PG (ref 26–34)
MCHC RBC AUTO-ENTMCNC: 33.1 G/DL (ref 31–36)
MCV RBC AUTO: 87 FL (ref 80–100)
MONOCYTES # BLD: 0.4 K/UL (ref 0–1.3)
MONOCYTES NFR BLD: 8.1 %
NEUTROPHILS # BLD: 2.7 K/UL (ref 1.7–7.7)
NEUTROPHILS NFR BLD: 51.3 %
NITRITE UR QL STRIP.AUTO: NEGATIVE
PH UR STRIP.AUTO: 6.5 [PH] (ref 5–8)
PLATELET # BLD AUTO: 192 K/UL (ref 135–450)
PMV BLD AUTO: 10.1 FL (ref 5–10.5)
POTASSIUM SERPL-SCNC: 3.8 MMOL/L (ref 3.5–5.1)
PROT SERPL-MCNC: 6.8 G/DL (ref 6.4–8.2)
PROT UR STRIP.AUTO-MCNC: NEGATIVE MG/DL
PSA SERPL DL<=0.01 NG/ML-MCNC: 0.1 NG/ML (ref 0–4)
RBC # BLD AUTO: 4.52 M/UL (ref 4.2–5.9)
SODIUM SERPL-SCNC: 145 MMOL/L (ref 136–145)
SP GR UR STRIP.AUTO: 1.01 (ref 1–1.03)
TRIGL SERPL-MCNC: 45 MG/DL (ref 0–150)
TSH SERPL DL<=0.005 MIU/L-ACNC: 1.13 UIU/ML (ref 0.27–4.2)
UA DIPSTICK W REFLEX MICRO PNL UR: NORMAL
URN SPEC COLLECT METH UR: NORMAL
UROBILINOGEN UR STRIP-ACNC: 1 E.U./DL
VLDLC SERPL CALC-MCNC: 9 MG/DL
WBC # BLD AUTO: 5.2 K/UL (ref 4–11)

## 2024-04-05 NOTE — PLAN OF CARE
Problem: Discharge Planning  Goal: Discharge to home or other facility with appropriate resources  Outcome: Progressing  Flowsheets (Taken 11/8/2022 0933)  Discharge to home or other facility with appropriate resources: Identify barriers to discharge with patient and caregiver     Problem: Skin/Tissue Integrity  Goal: Absence of new skin breakdown  Description: 1. Monitor for areas of redness and/or skin breakdown  2. Assess vascular access sites hourly  3. Every 4-6 hours minimum:  Change oxygen saturation probe site  4. Every 4-6 hours:  If on nasal continuous positive airway pressure, respiratory therapy assess nares and determine need for appliance change or resting period.   Outcome: Progressing     Problem: ABCDS Injury Assessment  Goal: Absence of physical injury  Outcome: Progressing  Flowsheets (Taken 11/8/2022 0930)  Absence of Physical Injury: Implement safety measures based on patient assessment     Problem: Safety - Adult  Goal: Free from fall injury  Outcome: Progressing  Flowsheets (Taken 11/8/2022 0930)  Free From Fall Injury: Instruct family/caregiver on patient safety     Problem: Chronic Conditions and Co-morbidities  Goal: Patient's chronic conditions and co-morbidity symptoms are monitored and maintained or improved  Outcome: Progressing  Flowsheets (Taken 11/8/2022 0933)  Care Plan - Patient's Chronic Conditions and Co-Morbidity Symptoms are Monitored and Maintained or Improved: Monitor and assess patient's chronic conditions and comorbid symptoms for stability, deterioration, or improvement     Problem: Respiratory - Adult  Goal: Achieves optimal ventilation and oxygenation  Outcome: Progressing  Flowsheets (Taken 11/8/2022 0933)  Achieves optimal ventilation and oxygenation: Assess for changes in respiratory status     Problem: Cardiovascular - Adult  Goal: Maintains optimal cardiac output and hemodynamic stability  Outcome: Progressing  Flowsheets (Taken 11/8/2022 0933)  Maintains optimal cardiac output and hemodynamic stability: Monitor blood pressure and heart rate  Goal: Absence of cardiac dysrhythmias or at baseline  Outcome: Progressing  Flowsheets (Taken 11/8/2022 0933)  Absence of cardiac dysrhythmias or at baseline: Monitor cardiac rate and rhythm     Problem: Metabolic/Fluid and Electrolytes - Adult  Goal: Electrolytes maintained within normal limits  Outcome: Progressing  Flowsheets (Taken 11/8/2022 0933)  Electrolytes maintained within normal limits: Monitor labs and assess patient for signs and symptoms of electrolyte imbalances  Goal: Hemodynamic stability and optimal renal function maintained  Outcome: Progressing  Flowsheets (Taken 11/8/2022 0933)  Hemodynamic stability and optimal renal function maintained: Monitor labs and assess for signs and symptoms of volume excess or deficit never

## 2024-04-13 PROCEDURE — 93297 REM INTERROG DEV EVAL ICPMS: CPT | Performed by: NURSE PRACTITIONER

## 2024-05-28 DIAGNOSIS — N32.81 OAB (OVERACTIVE BLADDER): ICD-10-CM

## 2024-05-28 RX ORDER — FUROSEMIDE 40 MG/1
TABLET ORAL
Qty: 90 TABLET | Refills: 1 | Status: SHIPPED | OUTPATIENT
Start: 2024-05-28

## 2024-05-28 RX ORDER — SACUBITRIL AND VALSARTAN 24; 26 MG/1; MG/1
TABLET, FILM COATED ORAL
Qty: 30 TABLET | Refills: 3 | Status: SHIPPED | OUTPATIENT
Start: 2024-05-28

## 2024-05-28 RX ORDER — OXYBUTYNIN CHLORIDE 5 MG/1
5 TABLET, EXTENDED RELEASE ORAL DAILY
Qty: 90 TABLET | Refills: 3 | Status: SHIPPED | OUTPATIENT
Start: 2024-05-28

## 2024-05-28 NOTE — TELEPHONE ENCOUNTER
Requested Prescriptions     Pending Prescriptions Disp Refills    furosemide (LASIX) 40 MG tablet [Pharmacy Med Name: FUROSEMIDE 40MG TABLETS] 90 tablet 1     Sig: TAKE 1 TABLET BY MOUTH DAILY AS NEEDED FOR LOWER EXTREMITY SWELLING            Last Office Visit: 1/11/2024     Next Office Visit: 7/11/2024

## 2024-05-28 NOTE — TELEPHONE ENCOUNTER
Medication:   Requested Prescriptions     Pending Prescriptions Disp Refills    ENTRESTO 24-26 MG per tablet [Pharmacy Med Name: ENTRESTO 24-26MG TABLETS] 30 tablet 3     Sig: TAKE 1/2 TABLET BY MOUTH TWICE DAILY    oxyBUTYnin (DITROPAN-XL) 5 MG extended release tablet [Pharmacy Med Name: OXYBUTYNIN ER 5MG TABLETS] 90 tablet 1     Sig: TAKE 1 TABLET BY MOUTH DAILY     Last Filled:  # 90 oxybutynin with 1 refill on 2/8/24 and Entesto #30 with 3 refills on 12/26/23    Last appt: 3/12/2024   Next appt: 6/11/2024    Last OARRS:        No data to display

## 2024-06-11 ENCOUNTER — OFFICE VISIT (OUTPATIENT)
Dept: INTERNAL MEDICINE CLINIC | Age: 89
End: 2024-06-11
Payer: MEDICARE

## 2024-06-11 DIAGNOSIS — I10 PRIMARY HYPERTENSION: ICD-10-CM

## 2024-06-11 DIAGNOSIS — E78.2 MIXED HYPERLIPIDEMIA: Primary | ICD-10-CM

## 2024-06-11 DIAGNOSIS — F02.B0 MODERATE DEMENTIA DUE TO PARKINSON'S DISEASE, WITHOUT BEHAVIORAL DISTURBANCE, PSYCHOTIC DISTURBANCE, MOOD DISTURBANCE, OR ANXIETY (HCC): ICD-10-CM

## 2024-06-11 DIAGNOSIS — E78.2 MIXED HYPERLIPIDEMIA: ICD-10-CM

## 2024-06-11 DIAGNOSIS — G31.83 MODERATE LEWY BODY DEMENTIA WITHOUT BEHAVIORAL DISTURBANCE, PSYCHOTIC DISTURBANCE, MOOD DISTURBANCE, OR ANXIETY (HCC): ICD-10-CM

## 2024-06-11 DIAGNOSIS — I25.5 ISCHEMIC CARDIOMYOPATHY: ICD-10-CM

## 2024-06-11 DIAGNOSIS — R35.0 BENIGN PROSTATIC HYPERPLASIA WITH URINARY FREQUENCY: ICD-10-CM

## 2024-06-11 DIAGNOSIS — G20.A1 MODERATE DEMENTIA DUE TO PARKINSON'S DISEASE, WITHOUT BEHAVIORAL DISTURBANCE, PSYCHOTIC DISTURBANCE, MOOD DISTURBANCE, OR ANXIETY (HCC): ICD-10-CM

## 2024-06-11 DIAGNOSIS — N40.1 BENIGN PROSTATIC HYPERPLASIA WITH URINARY FREQUENCY: ICD-10-CM

## 2024-06-11 DIAGNOSIS — H91.93 BILATERAL HEARING LOSS, UNSPECIFIED HEARING LOSS TYPE: ICD-10-CM

## 2024-06-11 DIAGNOSIS — F02.B0 MODERATE LEWY BODY DEMENTIA WITHOUT BEHAVIORAL DISTURBANCE, PSYCHOTIC DISTURBANCE, MOOD DISTURBANCE, OR ANXIETY (HCC): ICD-10-CM

## 2024-06-11 PROCEDURE — G2211 COMPLEX E/M VISIT ADD ON: HCPCS | Performed by: INTERNAL MEDICINE

## 2024-06-11 PROCEDURE — 99215 OFFICE O/P EST HI 40 MIN: CPT | Performed by: INTERNAL MEDICINE

## 2024-06-11 PROCEDURE — 1123F ACP DISCUSS/DSCN MKR DOCD: CPT | Performed by: INTERNAL MEDICINE

## 2024-06-11 SDOH — ECONOMIC STABILITY: HOUSING INSECURITY
IN THE LAST 12 MONTHS, WAS THERE A TIME WHEN YOU DID NOT HAVE A STEADY PLACE TO SLEEP OR SLEPT IN A SHELTER (INCLUDING NOW)?: NO

## 2024-06-11 SDOH — ECONOMIC STABILITY: FOOD INSECURITY: WITHIN THE PAST 12 MONTHS, THE FOOD YOU BOUGHT JUST DIDN'T LAST AND YOU DIDN'T HAVE MONEY TO GET MORE.: NEVER TRUE

## 2024-06-11 SDOH — ECONOMIC STABILITY: FOOD INSECURITY: WITHIN THE PAST 12 MONTHS, YOU WORRIED THAT YOUR FOOD WOULD RUN OUT BEFORE YOU GOT MONEY TO BUY MORE.: NEVER TRUE

## 2024-06-11 SDOH — ECONOMIC STABILITY: INCOME INSECURITY: HOW HARD IS IT FOR YOU TO PAY FOR THE VERY BASICS LIKE FOOD, HOUSING, MEDICAL CARE, AND HEATING?: NOT VERY HARD

## 2024-06-11 NOTE — PROGRESS NOTES
Patient: Harjinder Mcgee Jr is a 88 y.o. male who presents today with the following Chief Complaint(s):    Chief Complaint   Patient presents with    Follow-up     3 month f/u, concerns about balance,frequency urinating,and forgetfulness        Urinary Frequency         HPIpee a lot, thinking is off, balance is off. No recent falls. Appetite good. No dysuria. Peeing about the same. Drives from [employer] daily. Somone in car with him. 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 Rec someone else drive. Remembered that I added hearing to the 3. More forgetgul.   Does a little work.  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 He is still with the game so to speak.   Trace to 1/2 + edema, L>R.   Current Outpatient Medications   Medication Sig Dispense Refill    furosemide (LASIX) 40 MG tablet TAKE 1 TABLET BY MOUTH DAILY AS NEEDED FOR LOWER EXTREMITY SWELLING 90 tablet 1    ENTRESTO 24-26 MG per tablet TAKE 1/2 TABLET BY MOUTH TWICE DAILY 30 tablet 3    oxyBUTYnin (DITROPAN-XL) 5 MG extended release tablet TAKE 1 TABLET BY MOUTH DAILY 90 tablet 3    spironolactone (ALDACTONE) 25 MG tablet TAKE 1/2 TABLET BY MOUTH DAILY 30 tablet 3    tamsulosin (FLOMAX) 0.4 MG capsule TAKE 1 CAPSULE BY MOUTH DAILY 90 capsule 3    aspirin 81 MG chewable tablet Take 1 tablet by mouth daily 30 tablet 3    atorvastatin (LIPITOR) 40 MG tablet TAKE 1 TABLET BY MOUTH DAILY FOR CHOLESTEROL 90 tablet 3    metoprolol succinate (TOPROL XL) 25 MG extended release tablet TAKE 1 TABLET BY MOUTH DAILY FOR BLOOD PRESSURE AND HEART 90 tablet 3     No current facility-administered medications for this visit.       Patient's past medical history, surgical history, family history, medications,and allergies  were all reviewed and updated as appropriate today.      Review of Systems      Physical Exam    Vitals:    06/11/24 1026   BP: 118/62   Pulse: (!) 41   Temp: 97.1 °F (36.2 °C)

## 2024-06-12 LAB
ANION GAP SERPL CALCULATED.3IONS-SCNC: 10 MMOL/L (ref 3–16)
BUN SERPL-MCNC: 15 MG/DL (ref 7–20)
CALCIUM SERPL-MCNC: 9.4 MG/DL (ref 8.3–10.6)
CHLORIDE SERPL-SCNC: 105 MMOL/L (ref 99–110)
CHOLEST SERPL-MCNC: 177 MG/DL (ref 0–199)
CO2 SERPL-SCNC: 25 MMOL/L (ref 21–32)
CREAT SERPL-MCNC: 0.9 MG/DL (ref 0.8–1.3)
GFR SERPLBLD CREATININE-BSD FMLA CKD-EPI: 83 ML/MIN/{1.73_M2}
GLUCOSE SERPL-MCNC: 70 MG/DL (ref 70–99)
HDLC SERPL-MCNC: 68 MG/DL (ref 40–60)
LDLC SERPL CALC-MCNC: 99 MG/DL
POTASSIUM SERPL-SCNC: 3.9 MMOL/L (ref 3.5–5.1)
SODIUM SERPL-SCNC: 140 MMOL/L (ref 136–145)
TRIGL SERPL-MCNC: 48 MG/DL (ref 0–150)
VLDLC SERPL CALC-MCNC: 10 MG/DL

## 2024-06-20 VITALS
OXYGEN SATURATION: 99 % | TEMPERATURE: 97.1 F | HEIGHT: 73 IN | SYSTOLIC BLOOD PRESSURE: 118 MMHG | BODY MASS INDEX: 20.28 KG/M2 | DIASTOLIC BLOOD PRESSURE: 62 MMHG | HEART RATE: 50 BPM | WEIGHT: 153 LBS

## 2024-06-20 ASSESSMENT — ENCOUNTER SYMPTOMS
RESPIRATORY NEGATIVE: 1
GASTROINTESTINAL NEGATIVE: 1
EYES NEGATIVE: 1

## 2024-06-20 NOTE — PROGRESS NOTES
Patient: Harjinder Mcgee Jr is a 88 y.o. male who presents today with the following Chief Complaint(s):    Chief Complaint   Patient presents with    Follow-up     3 month f/u, concerns about balance,frequency urinating,and forgetfulness        Urinary Frequency         HPIHe is here for a checkup. Doing about the same. Denies CP or SOB. H/O cardiomyopathy, ischemic with HFrEF. Treated with Blker  Entresto, aldactone, furosemide.  He has a h/o ICD placement and is doing well. ECHO, LVEF 15 to 20 %. Cardiology f/u.         He suffers from mild-moderate dementia. Still functional with ADL. Family takes care of executive functions. Home support system consist of daughter who stays in the home. Wife is home but is challenged from prior stroke. Grandson there also. Son who accompanies him today visits home routinely. Socialization is good. Now OT/PT/CNA visit 2 to 3 times per week. Patient keeps a note pad that he refers to. Son notes he is more alert and there is slight improvement in overall function. Sons number is [phone]. Patient notes 3 problems: peeing, thinking and hearing. Son plans to get hearing aid from [facility]. He still drives short distances against my advice. Someone is usually with him.              Current Outpatient Medications   Medication Sig Dispense Refill    furosemide (LASIX) 40 MG tablet TAKE 1 TABLET BY MOUTH DAILY AS NEEDED FOR LOWER EXTREMITY SWELLING 90 tablet 1    ENTRESTO 24-26 MG per tablet TAKE 1/2 TABLET BY MOUTH TWICE DAILY 30 tablet 3    oxyBUTYnin (DITROPAN-XL) 5 MG extended release tablet TAKE 1 TABLET BY MOUTH DAILY 90 tablet 3    spironolactone (ALDACTONE) 25 MG tablet TAKE 1/2 TABLET BY MOUTH DAILY 30 tablet 3    tamsulosin (FLOMAX) 0.4 MG capsule TAKE 1 CAPSULE BY MOUTH DAILY 90 capsule 3    aspirin 81 MG chewable tablet Take 1 tablet by mouth daily 30 tablet 3    atorvastatin (LIPITOR) 40 MG tablet TAKE 1 TABLET BY MOUTH DAILY FOR CHOLESTEROL 90 tablet 3    metoprolol succinate

## 2024-07-02 NOTE — PROGRESS NOTES
to Admission medications    Medication Sig Start Date End Date Taking? Authorizing Provider   furosemide (LASIX) 40 MG tablet TAKE 1 TABLET BY MOUTH DAILY AS NEEDED FOR LOWER EXTREMITY SWELLING 5/28/24  Yes Nhung Casper APRN - CNP   ENTRESTO 24-26 MG per tablet TAKE 1/2 TABLET BY MOUTH TWICE DAILY 5/28/24  Yes Jorge Will MD   oxyBUTYnin (DITROPAN-XL) 5 MG extended release tablet TAKE 1 TABLET BY MOUTH DAILY 5/28/24  Yes Jorge Will MD   spironolactone (ALDACTONE) 25 MG tablet TAKE 1/2 TABLET BY MOUTH DAILY 1/25/24  Yes Jorge Will MD   tamsulosin (FLOMAX) 0.4 MG capsule TAKE 1 CAPSULE BY MOUTH DAILY 11/3/23  Yes Jorge Will MD   aspirin 81 MG chewable tablet Take 1 tablet by mouth daily 11/11/22  Yes Paul Pabon MD   atorvastatin (LIPITOR) 40 MG tablet TAKE 1 TABLET BY MOUTH DAILY FOR CHOLESTEROL 11/3/23 3/12/24  Jorge Will MD   metoprolol succinate (TOPROL XL) 25 MG extended release tablet TAKE 1 TABLET BY MOUTH DAILY FOR BLOOD PRESSURE AND HEART 10/25/23 3/12/24  Jorge Will MD       Social History:   reports that he quit smoking about 49 years ago. His smoking use included cigarettes. He has never used smokeless tobacco. He reports current alcohol use. He reports that he does not use drugs.        Family History:  family history includes Heart Disease in his father and mother.   Reviewed. Denies family history of sudden cardiac death, arrhythmia, premature CAD    Review of System:    General ROS: negative for - chills, fever   Psychological ROS: negative for - anxiety or depression  Ophthalmic ROS: negative for - eye pain or loss of vision  ENT ROS: negative for - epistaxis, headaches, nasal discharge, sore throat   Allergy and Immunology ROS: negative for - hives, nasal congestion   Hematological and Lymphatic ROS: negative for - bleeding problems, blood clots, bruising or jaundice  Endocrine ROS: negative for - skin changes, temperature intolerance or unexpected weight

## 2024-07-11 ENCOUNTER — NURSE ONLY (OUTPATIENT)
Dept: CARDIOLOGY CLINIC | Age: 89
End: 2024-07-11

## 2024-07-11 ENCOUNTER — OFFICE VISIT (OUTPATIENT)
Dept: CARDIOLOGY CLINIC | Age: 89
End: 2024-07-11

## 2024-07-11 VITALS
HEART RATE: 72 BPM | DIASTOLIC BLOOD PRESSURE: 62 MMHG | WEIGHT: 153 LBS | BODY MASS INDEX: 20.19 KG/M2 | SYSTOLIC BLOOD PRESSURE: 110 MMHG

## 2024-07-11 DIAGNOSIS — I25.10 CORONARY ARTERY DISEASE INVOLVING NATIVE CORONARY ARTERY OF NATIVE HEART WITHOUT ANGINA PECTORIS: ICD-10-CM

## 2024-07-11 DIAGNOSIS — Z95.810 IMPLANTABLE CARDIOVERTER-DEFIBRILLATOR (ICD) IN SITU: Primary | ICD-10-CM

## 2024-07-11 DIAGNOSIS — I47.29 NSVT (NONSUSTAINED VENTRICULAR TACHYCARDIA) (HCC): ICD-10-CM

## 2024-07-11 DIAGNOSIS — Z95.810 CARDIAC DEFIBRILLATOR IN SITU: Primary | ICD-10-CM

## 2024-07-11 DIAGNOSIS — I25.5 ISCHEMIC CARDIOMYOPATHY: ICD-10-CM

## 2024-07-11 DIAGNOSIS — I50.22 CHRONIC SYSTOLIC (CONGESTIVE) HEART FAILURE (HCC): ICD-10-CM

## 2024-07-11 DIAGNOSIS — I10 BENIGN ESSENTIAL HTN: ICD-10-CM

## 2024-07-15 ENCOUNTER — OFFICE VISIT (OUTPATIENT)
Dept: CARDIOLOGY CLINIC | Age: 89
End: 2024-07-15
Payer: MEDICARE

## 2024-07-15 VITALS
WEIGHT: 153 LBS | HEART RATE: 64 BPM | BODY MASS INDEX: 20.19 KG/M2 | SYSTOLIC BLOOD PRESSURE: 132 MMHG | DIASTOLIC BLOOD PRESSURE: 64 MMHG

## 2024-07-15 DIAGNOSIS — Z95.810 IMPLANTABLE CARDIOVERTER-DEFIBRILLATOR (ICD) IN SITU: Primary | ICD-10-CM

## 2024-07-15 DIAGNOSIS — I42.9 CARDIOMYOPATHY, UNSPECIFIED TYPE (HCC): ICD-10-CM

## 2024-07-15 DIAGNOSIS — E78.5 HYPERLIPIDEMIA LDL GOAL <70: ICD-10-CM

## 2024-07-15 PROCEDURE — 93297 REM INTERROG DEV EVAL ICPMS: CPT | Performed by: NURSE PRACTITIONER

## 2024-07-15 PROCEDURE — 99214 OFFICE O/P EST MOD 30 MIN: CPT | Performed by: INTERNAL MEDICINE

## 2024-07-15 PROCEDURE — 1123F ACP DISCUSS/DSCN MKR DOCD: CPT | Performed by: INTERNAL MEDICINE

## 2024-07-15 NOTE — PROGRESS NOTES
[facility] [place]   Dr Jarod Becker MD, [facility] [place]    Outpatient Follow Up Note    7/15/2024,10:58 AM  Subjective:   CHIEF COMPLAINT / HPI:  Follow Up secondary to CAD and post bypass and cardiomyopathy.     Harjinder Mcgee Jr is 89 y.o. male who here here today with his son on  7/15/24for follow-up of his cardiac and ischemic disease.  Clinically stable.  Is progressive dementia but has periods of being alert and lucid.  Pacemaker interrogation was reviewed and has a year battery life.  He is active and moving about and there is very minimal edema in both feet.  Today looks fairly stable.  He is attributing most of his issues to the fact that he is getting old.  Did have a recent 89th birthday.  No current check discomfort.  Did have a pacemaker AICD evaluation on 2024 and he has about 12.7 battery life years on it.               Covid pneumonia   CHF ISCHEMIC CARDIOMYOPATHY EF 15-20%  CAD and CABG  TURP for BPH     Past Medical History:    Past Medical History:   Diagnosis Date    Arthritis     Balance disorder     CAD (coronary artery disease)     Chest pain     Dental disease     Dizziness     Dyslipidemia     Fatigue     Hard of hearing     History of prostate cancer     Hypertension     Hyperthyroidism     Impaired memory     SOB (shortness of breath)     Vitamin D deficiency      Past Surgical History  Past Surgical History:   Procedure Laterality Date    CARDIAC SURGERY       Social History:       Social History     Tobacco Use   Smoking Status Former    Current packs/day: 0.00    Types: Cigarettes    Quit date: 1975    Years since quittin.5   Smokeless Tobacco Never     Current Medications:  Prior to Visit Medications    Medication Sig Taking? Authorizing Provider   furosemide (LASIX) 40 MG tablet TAKE 1 TABLET BY MOUTH DAILY AS NEEDED FOR LOWER EXTREMITY SWELLING Yes Nhung Casper, APRN - CNP   ENTRESTO 24-26 MG per tablet TAKE 1/2 TABLET BY MOUTH TWICE

## 2024-09-04 RX ORDER — TAMSULOSIN HYDROCHLORIDE 0.4 MG/1
0.4 CAPSULE ORAL DAILY
Qty: 90 CAPSULE | Refills: 3 | Status: SHIPPED | OUTPATIENT
Start: 2024-09-04

## 2024-09-11 ENCOUNTER — OFFICE VISIT (OUTPATIENT)
Dept: INTERNAL MEDICINE CLINIC | Age: 89
End: 2024-09-11
Payer: MEDICARE

## 2024-09-11 VITALS
SYSTOLIC BLOOD PRESSURE: 114 MMHG | BODY MASS INDEX: 19.66 KG/M2 | OXYGEN SATURATION: 98 % | HEART RATE: 67 BPM | WEIGHT: 149 LBS | DIASTOLIC BLOOD PRESSURE: 66 MMHG

## 2024-09-11 DIAGNOSIS — C61 PROSTATE CANCER (HCC): ICD-10-CM

## 2024-09-11 DIAGNOSIS — G20.A1 MODERATE DEMENTIA DUE TO PARKINSON'S DISEASE, WITHOUT BEHAVIORAL DISTURBANCE, PSYCHOTIC DISTURBANCE, MOOD DISTURBANCE, OR ANXIETY (HCC): ICD-10-CM

## 2024-09-11 DIAGNOSIS — R26.89 BALANCE DISORDER: ICD-10-CM

## 2024-09-11 DIAGNOSIS — I25.10 CORONARY ARTERY DISEASE INVOLVING NATIVE CORONARY ARTERY OF NATIVE HEART WITHOUT ANGINA PECTORIS: ICD-10-CM

## 2024-09-11 DIAGNOSIS — Z23 NEEDS FLU SHOT: ICD-10-CM

## 2024-09-11 DIAGNOSIS — N32.81 OAB (OVERACTIVE BLADDER): ICD-10-CM

## 2024-09-11 DIAGNOSIS — Z00.00 MEDICARE ANNUAL WELLNESS VISIT, SUBSEQUENT: Primary | ICD-10-CM

## 2024-09-11 DIAGNOSIS — F02.B0 MODERATE DEMENTIA DUE TO PARKINSON'S DISEASE, WITHOUT BEHAVIORAL DISTURBANCE, PSYCHOTIC DISTURBANCE, MOOD DISTURBANCE, OR ANXIETY (HCC): ICD-10-CM

## 2024-09-11 PROCEDURE — 1123F ACP DISCUSS/DSCN MKR DOCD: CPT | Performed by: INTERNAL MEDICINE

## 2024-09-11 PROCEDURE — G0439 PPPS, SUBSEQ VISIT: HCPCS | Performed by: INTERNAL MEDICINE

## 2024-09-11 ASSESSMENT — PATIENT HEALTH QUESTIONNAIRE - PHQ9
SUM OF ALL RESPONSES TO PHQ9 QUESTIONS 1 & 2: 0
SUM OF ALL RESPONSES TO PHQ QUESTIONS 1-9: 0
10. IF YOU CHECKED OFF ANY PROBLEMS, HOW DIFFICULT HAVE THESE PROBLEMS MADE IT FOR YOU TO DO YOUR WORK, TAKE CARE OF THINGS AT HOME, OR GET ALONG WITH OTHER PEOPLE: NOT DIFFICULT AT ALL
SUM OF ALL RESPONSES TO PHQ QUESTIONS 1-9: 0
7. TROUBLE CONCENTRATING ON THINGS, SUCH AS READING THE NEWSPAPER OR WATCHING TELEVISION: NOT AT ALL
9. THOUGHTS THAT YOU WOULD BE BETTER OFF DEAD, OR OF HURTING YOURSELF: NOT AT ALL
6. FEELING BAD ABOUT YOURSELF - OR THAT YOU ARE A FAILURE OR HAVE LET YOURSELF OR YOUR FAMILY DOWN: NOT AT ALL
8. MOVING OR SPEAKING SO SLOWLY THAT OTHER PEOPLE COULD HAVE NOTICED. OR THE OPPOSITE, BEING SO FIGETY OR RESTLESS THAT YOU HAVE BEEN MOVING AROUND A LOT MORE THAN USUAL: NOT AT ALL
4. FEELING TIRED OR HAVING LITTLE ENERGY: NOT AT ALL
SUM OF ALL RESPONSES TO PHQ QUESTIONS 1-9: 0
SUM OF ALL RESPONSES TO PHQ QUESTIONS 1-9: 0
5. POOR APPETITE OR OVEREATING: NOT AT ALL
1. LITTLE INTEREST OR PLEASURE IN DOING THINGS: NOT AT ALL
3. TROUBLE FALLING OR STAYING ASLEEP: NOT AT ALL
2. FEELING DOWN, DEPRESSED OR HOPELESS: NOT AT ALL

## 2024-09-11 ASSESSMENT — LIFESTYLE VARIABLES
HOW OFTEN DO YOU HAVE A DRINK CONTAINING ALCOHOL: NEVER
HOW MANY STANDARD DRINKS CONTAINING ALCOHOL DO YOU HAVE ON A TYPICAL DAY: PATIENT DOES NOT DRINK

## 2024-09-19 PROCEDURE — G0008 ADMIN INFLUENZA VIRUS VAC: HCPCS | Performed by: INTERNAL MEDICINE

## 2024-09-19 PROCEDURE — 90653 IIV ADJUVANT VACCINE IM: CPT | Performed by: INTERNAL MEDICINE

## 2024-10-07 NOTE — TELEPHONE ENCOUNTER
Medication:   Requested Prescriptions     Pending Prescriptions Disp Refills    spironolactone (ALDACTONE) 25 MG tablet [Pharmacy Med Name: SPIRONOLACTONE 25MG TABLETS] 30 tablet 3     Sig: TAKE ONE-HALF TABLET BY MOUTH DAILY        Last Filled:      Patient Phone Number: [phone] (home)     Last appt: 9/11/2024   Next appt: 12/19/2024    Last OARRS:        No data to display

## 2024-10-08 RX ORDER — SPIRONOLACTONE 25 MG/1
12.5 TABLET ORAL DAILY
Qty: 30 TABLET | Refills: 3 | Status: SHIPPED | OUTPATIENT
Start: 2024-10-08

## 2024-10-09 RX ORDER — SPIRONOLACTONE 25 MG/1
12.5 TABLET ORAL DAILY
Qty: 45 TABLET | OUTPATIENT
Start: 2024-10-09

## 2024-10-09 NOTE — TELEPHONE ENCOUNTER
Medication:   Requested Prescriptions     Pending Prescriptions Disp Refills    spironolactone (ALDACTONE) 25 MG tablet [Pharmacy Med Name: SPIRONOLACTONE 25MG TABLETS] 45 tablet      Sig: TAKE 1/2 TABLET BY MOUTH DAILY        Last Filled:      Patient Phone Number: [phone] (home)     Last appt: 9/11/2024   Next appt: 12/19/2024    Last OARRS:        No data to display

## 2024-12-17 PROCEDURE — 93297 REM INTERROG DEV EVAL ICPMS: CPT | Performed by: NURSE PRACTITIONER

## 2024-12-21 DIAGNOSIS — E78.00 PURE HYPERCHOLESTEROLEMIA: ICD-10-CM

## 2024-12-23 NOTE — TELEPHONE ENCOUNTER
Medication:   Requested Prescriptions     Pending Prescriptions Disp Refills    atorvastatin (LIPITOR) 40 MG tablet [Pharmacy Med Name: ATORVASTATIN 40MG TABLETS] 90 tablet 3     Sig: TAKE 1 TABLET BY MOUTH DAILY FOR CHOLESTEROL        Last Filled:      Patient Phone Number: [phone] (home)     Last appt: 9/11/2024   Next appt: 1/28/2025    Last OARRS:        No data to display

## 2024-12-24 RX ORDER — FUROSEMIDE 40 MG/1
TABLET ORAL
Qty: 90 TABLET | Refills: 1 | Status: SHIPPED | OUTPATIENT
Start: 2024-12-24

## 2024-12-24 NOTE — TELEPHONE ENCOUNTER
Requested Prescriptions     Pending Prescriptions Disp Refills    furosemide (LASIX) 40 MG tablet [Pharmacy Med Name: FUROSEMIDE 40MG TABLETS] 90 tablet 1     Sig: TAKE 1 TABLET BY MOUTH DAILY AS NEEDED FOR LOWER EXTREMITY SWELLING            Checked Correct Pharmacy YES    Any changes since last refill? No     Number: 90  Refills: 3    Last OV: 7/11/2024 Provider: DEVANG    Next OV: 06/25/2025: NPFW    Last Labs:   BMP:   Lab Results   Component Value Date/Time     06/11/2024 12:24 PM    K 3.9 06/11/2024 12:24 PM    K 4.4 11/05/2022 05:15 AM     06/11/2024 12:24 PM    CO2 25 06/11/2024 12:24 PM    BUN 15 06/11/2024 12:24 PM    CREATININE 0.9 06/11/2024 12:24 PM    GLUCOSE 70 06/11/2024 12:24 PM    CALCIUM 9.4 06/11/2024 12:24 PM    LABGLOM 83 06/11/2024 12:24 PM    LABGLOM 83 03/28/2024 04:01 PM

## 2024-12-26 RX ORDER — ATORVASTATIN CALCIUM 40 MG/1
40 TABLET, FILM COATED ORAL DAILY
Qty: 90 TABLET | Refills: 3 | Status: SHIPPED | OUTPATIENT
Start: 2024-12-26

## 2025-02-14 NOTE — TELEPHONE ENCOUNTER
Medication:   Requested Prescriptions     Pending Prescriptions Disp Refills    ENTRESTO 24-26 MG per tablet [Pharmacy Med Name: ENTRESTO 24-26MG TABLETS] 30 tablet 3     Sig: TAKE 1/2 TABLET BY MOUTH TWICE DAILY        Last Filled:      Patient Phone Number: [phone] (home)     Last appt: 9/11/2024   Next appt: 2/25/2025    Last OARRS:        No data to display

## 2025-02-15 RX ORDER — SACUBITRIL AND VALSARTAN 24; 26 MG/1; MG/1
TABLET, FILM COATED ORAL
Qty: 30 TABLET | Refills: 5 | Status: SHIPPED | OUTPATIENT
Start: 2025-02-15

## 2025-02-27 ENCOUNTER — HOSPITAL ENCOUNTER (INPATIENT)
Age: 89
LOS: 7 days | Discharge: NURSING, REHAB OR LONG-TERM CARE | DRG: 522 | End: 2025-03-06
Attending: STUDENT IN AN ORGANIZED HEALTH CARE EDUCATION/TRAINING PROGRAM | Admitting: INTERNAL MEDICINE
Payer: MEDICARE

## 2025-02-27 ENCOUNTER — APPOINTMENT (OUTPATIENT)
Dept: GENERAL RADIOLOGY | Age: 89
DRG: 522 | End: 2025-02-27
Payer: MEDICARE

## 2025-02-27 DIAGNOSIS — S72.001A CLOSED RIGHT HIP FRACTURE, INITIAL ENCOUNTER (HCC): ICD-10-CM

## 2025-02-27 DIAGNOSIS — S72.001A CLOSED DISPLACED FRACTURE OF RIGHT FEMORAL NECK (HCC): Primary | ICD-10-CM
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
## 2025-02-27 LAB
ANION GAP SERPL CALCULATED.3IONS-SCNC: 9 MMOL/L (ref 3–16)
BASOPHILS # BLD: 0 K/UL (ref 0–0.2)
BASOPHILS NFR BLD: 0.2 %
BUN SERPL-MCNC: 13 MG/DL (ref 7–20)
CALCIUM SERPL-MCNC: 9.4 MG/DL (ref 8.3–10.6)
CHLORIDE SERPL-SCNC: 103 MMOL/L (ref 99–110)
CO2 SERPL-SCNC: 30 MMOL/L (ref 21–32)
CREAT SERPL-MCNC: 0.9 MG/DL (ref 0.8–1.3)
DEPRECATED RDW RBC AUTO: 13.5 % (ref 12.4–15.4)
EOSINOPHIL # BLD: 0 K/UL (ref 0–0.6)
EOSINOPHIL NFR BLD: 0.7 %
GFR SERPLBLD CREATININE-BSD FMLA CKD-EPI: 82 ML/MIN/{1.73_M2}
GLUCOSE SERPL-MCNC: 106 MG/DL (ref 70–99)
HCT VFR BLD AUTO: 40.4 % (ref 40.5–52.5)
HGB BLD-MCNC: 13.2 G/DL (ref 13.5–17.5)
LYMPHOCYTES # BLD: 0.9 K/UL (ref 1–5.1)
LYMPHOCYTES NFR BLD: 12.8 %
MCH RBC QN AUTO: 28.5 PG (ref 26–34)
MCHC RBC AUTO-ENTMCNC: 32.7 G/DL (ref 31–36)
MCV RBC AUTO: 87 FL (ref 80–100)
MONOCYTES # BLD: 0.4 K/UL (ref 0–1.3)
MONOCYTES NFR BLD: 5.6 %
NEUTROPHILS # BLD: 5.8 K/UL (ref 1.7–7.7)
NEUTROPHILS NFR BLD: 80.7 %
PLATELET # BLD AUTO: 214 K/UL (ref 135–450)
PMV BLD AUTO: 9.1 FL (ref 5–10.5)
POTASSIUM SERPL-SCNC: 4.7 MMOL/L (ref 3.5–5.1)
RBC # BLD AUTO: 4.64 M/UL (ref 4.2–5.9)
SODIUM SERPL-SCNC: 142 MMOL/L (ref 136–145)
WBC # BLD AUTO: 7.2 K/UL (ref 4–11)

## 2025-02-27 PROCEDURE — 80048 BASIC METABOLIC PNL TOTAL CA: CPT

## 2025-02-27 PROCEDURE — 6360000002 HC RX W HCPCS

## 2025-02-27 PROCEDURE — 99285 EMERGENCY DEPT VISIT HI MDM: CPT

## 2025-02-27 PROCEDURE — 6370000000 HC RX 637 (ALT 250 FOR IP): Performed by: INTERNAL MEDICINE

## 2025-02-27 PROCEDURE — 93005 ELECTROCARDIOGRAM TRACING: CPT

## 2025-02-27 PROCEDURE — 1200000000 HC SEMI PRIVATE

## 2025-02-27 PROCEDURE — 71045 X-RAY EXAM CHEST 1 VIEW: CPT

## 2025-02-27 PROCEDURE — 96374 THER/PROPH/DIAG INJ IV PUSH: CPT

## 2025-02-27 PROCEDURE — 85025 COMPLETE CBC W/AUTO DIFF WBC: CPT

## 2025-02-27 PROCEDURE — 73502 X-RAY EXAM HIP UNI 2-3 VIEWS: CPT

## 2025-02-27 PROCEDURE — 2500000003 HC RX 250 WO HCPCS: Performed by: INTERNAL MEDICINE

## 2025-02-27 PROCEDURE — 36415 COLL VENOUS BLD VENIPUNCTURE: CPT

## 2025-02-27 RX ORDER — OXYCODONE HYDROCHLORIDE 5 MG/1
10 TABLET ORAL EVERY 4 HOURS PRN
Status: DISCONTINUED | OUTPATIENT
Start: 2025-02-27 | End: 2025-03-06 | Stop reason: HOSPADM

## 2025-02-27 RX ORDER — ONDANSETRON 2 MG/ML
4 INJECTION INTRAMUSCULAR; INTRAVENOUS EVERY 6 HOURS PRN
Status: DISCONTINUED | OUTPATIENT
Start: 2025-02-27 | End: 2025-03-06 | Stop reason: HOSPADM

## 2025-02-27 RX ORDER — HYDROMORPHONE HYDROCHLORIDE 1 MG/ML
0.25 INJECTION, SOLUTION INTRAMUSCULAR; INTRAVENOUS; SUBCUTANEOUS
Status: DISCONTINUED | OUTPATIENT
Start: 2025-02-27 | End: 2025-03-04

## 2025-02-27 RX ORDER — POLYETHYLENE GLYCOL 3350 17 G/17G
17 POWDER, FOR SOLUTION ORAL DAILY PRN
Status: DISCONTINUED | OUTPATIENT
Start: 2025-02-27 | End: 2025-03-06 | Stop reason: HOSPADM

## 2025-02-27 RX ORDER — HYDROMORPHONE HYDROCHLORIDE 1 MG/ML
0.25 INJECTION, SOLUTION INTRAMUSCULAR; INTRAVENOUS; SUBCUTANEOUS
Status: COMPLETED | OUTPATIENT
Start: 2025-02-27 | End: 2025-02-27

## 2025-02-27 RX ORDER — SODIUM CHLORIDE 9 MG/ML
INJECTION, SOLUTION INTRAVENOUS PRN
Status: DISCONTINUED | OUTPATIENT
Start: 2025-02-27 | End: 2025-03-06 | Stop reason: HOSPADM

## 2025-02-27 RX ORDER — SODIUM CHLORIDE 0.9 % (FLUSH) 0.9 %
5-40 SYRINGE (ML) INJECTION PRN
Status: DISCONTINUED | OUTPATIENT
Start: 2025-02-27 | End: 2025-03-06 | Stop reason: HOSPADM

## 2025-02-27 RX ORDER — POTASSIUM CHLORIDE 1500 MG/1
40 TABLET, EXTENDED RELEASE ORAL PRN
Status: DISCONTINUED | OUTPATIENT
Start: 2025-02-27 | End: 2025-03-06 | Stop reason: HOSPADM

## 2025-02-27 RX ORDER — TAMSULOSIN HYDROCHLORIDE 0.4 MG/1
0.4 CAPSULE ORAL DAILY
Status: DISCONTINUED | OUTPATIENT
Start: 2025-02-28 | End: 2025-03-06 | Stop reason: HOSPADM

## 2025-02-27 RX ORDER — HYDROMORPHONE HYDROCHLORIDE 1 MG/ML
0.5 INJECTION, SOLUTION INTRAMUSCULAR; INTRAVENOUS; SUBCUTANEOUS
Status: DISCONTINUED | OUTPATIENT
Start: 2025-02-27 | End: 2025-03-04

## 2025-02-27 RX ORDER — POTASSIUM CHLORIDE 7.45 MG/ML
10 INJECTION INTRAVENOUS PRN
Status: DISCONTINUED | OUTPATIENT
Start: 2025-02-27 | End: 2025-03-06 | Stop reason: HOSPADM

## 2025-02-27 RX ORDER — SODIUM CHLORIDE 0.9 % (FLUSH) 0.9 %
5-40 SYRINGE (ML) INJECTION EVERY 12 HOURS SCHEDULED
Status: DISCONTINUED | OUTPATIENT
Start: 2025-02-27 | End: 2025-03-06 | Stop reason: HOSPADM

## 2025-02-27 RX ORDER — MAGNESIUM SULFATE IN WATER 40 MG/ML
2000 INJECTION, SOLUTION INTRAVENOUS PRN
Status: DISCONTINUED | OUTPATIENT
Start: 2025-02-27 | End: 2025-03-06 | Stop reason: HOSPADM

## 2025-02-27 RX ORDER — ONDANSETRON 4 MG/1
4 TABLET, ORALLY DISINTEGRATING ORAL EVERY 8 HOURS PRN
Status: DISCONTINUED | OUTPATIENT
Start: 2025-02-27 | End: 2025-03-06 | Stop reason: HOSPADM

## 2025-02-27 RX ORDER — ACETAMINOPHEN 650 MG/1
650 SUPPOSITORY RECTAL EVERY 6 HOURS PRN
Status: DISCONTINUED | OUTPATIENT
Start: 2025-02-27 | End: 2025-03-06 | Stop reason: HOSPADM

## 2025-02-27 RX ORDER — OXYCODONE HYDROCHLORIDE 5 MG/1
5 TABLET ORAL EVERY 4 HOURS PRN
Status: DISCONTINUED | OUTPATIENT
Start: 2025-02-27 | End: 2025-03-06 | Stop reason: HOSPADM

## 2025-02-27 RX ORDER — METOPROLOL SUCCINATE 25 MG/1
12.5 TABLET, EXTENDED RELEASE ORAL DAILY
Status: DISCONTINUED | OUTPATIENT
Start: 2025-02-28 | End: 2025-03-06 | Stop reason: HOSPADM

## 2025-02-27 RX ORDER — ACETAMINOPHEN 325 MG/1
650 TABLET ORAL EVERY 6 HOURS PRN
Status: DISCONTINUED | OUTPATIENT
Start: 2025-02-27 | End: 2025-03-06 | Stop reason: HOSPADM

## 2025-02-27 RX ADMIN — OXYCODONE 10 MG: 5 TABLET ORAL at 21:26

## 2025-02-27 RX ADMIN — HYDROMORPHONE HYDROCHLORIDE 0.25 MG: 1 INJECTION, SOLUTION INTRAMUSCULAR; INTRAVENOUS; SUBCUTANEOUS at 17:57

## 2025-02-27 RX ADMIN — SODIUM CHLORIDE, PRESERVATIVE FREE 10 ML: 5 INJECTION INTRAVENOUS at 21:26

## 2025-02-27 ASSESSMENT — PAIN DESCRIPTION - LOCATION
LOCATION: HIP
LOCATION: HIP

## 2025-02-27 ASSESSMENT — PAIN - FUNCTIONAL ASSESSMENT
PAIN_FUNCTIONAL_ASSESSMENT: NONE - DENIES PAIN
PAIN_FUNCTIONAL_ASSESSMENT: PREVENTS OR INTERFERES SOME ACTIVE ACTIVITIES AND ADLS
PAIN_FUNCTIONAL_ASSESSMENT: 0-10

## 2025-02-27 ASSESSMENT — PAIN SCALES - GENERAL
PAINLEVEL_OUTOF10: 8
PAINLEVEL_OUTOF10: 6
PAINLEVEL_OUTOF10: 7
PAINLEVEL_OUTOF10: 4

## 2025-02-27 ASSESSMENT — PAIN DESCRIPTION - DESCRIPTORS: DESCRIPTORS: ACHING

## 2025-02-27 ASSESSMENT — PAIN DESCRIPTION - ORIENTATION
ORIENTATION: RIGHT
ORIENTATION: RIGHT

## 2025-02-27 ASSESSMENT — PAIN DESCRIPTION - FREQUENCY
FREQUENCY: CONTINUOUS
FREQUENCY: CONTINUOUS

## 2025-02-27 ASSESSMENT — PAIN DESCRIPTION - PAIN TYPE: TYPE: ACUTE PAIN

## 2025-02-27 ASSESSMENT — PAIN DESCRIPTION - DIRECTION: RADIATING_TOWARDS: NO

## 2025-02-27 ASSESSMENT — LIFESTYLE VARIABLES
HOW OFTEN DO YOU HAVE A DRINK CONTAINING ALCOHOL: MONTHLY OR LESS
HOW MANY STANDARD DRINKS CONTAINING ALCOHOL DO YOU HAVE ON A TYPICAL DAY: 1 OR 2

## 2025-02-27 ASSESSMENT — PAIN DESCRIPTION - ONSET: ONSET: ON-GOING

## 2025-02-27 NOTE — ED PROVIDER NOTES
THE [facility]  EMERGENCY DEPARTMENT ENCOUNTER          EM RESIDENT NOTE     Date of Evaluation: 2/27/2025    Chief Complaint     Fall (Pt presents to the Ed after a fall at home. Pt states he was trying to walk don the stairs on his front porch and fell about 3ft and is now complaining of right hip pain. Pt denies hitting head and loss of consciousness.)    History of Present Illness     Harjinder Mcgee Jr is a 89 y.o. male with PMHx as below who presents with hip pain.  He states that he usually uses a cane to ambulate.  States he was walking down a flight of stairs today when the cane slipped and he fell down part of the flight of steps.  States that he landed on his right hip, and developed sudden onset right hip pain.  States that he has not been able to ambulate since.  He denies loss of consciousness, head strike, and states he is not anticoagulated.  He denies pain elsewhere or injuries elsewhere    Review of Systems     ROS: A comprehensive review of systems was performed and negative except as noted previously in the HPI.     Past Medical, Surgical, Family, and Social History     He has a past medical history of Arthritis, Balance disorder, CAD (coronary artery disease), Chest pain, Dental disease, Dizziness, Dyslipidemia, Fatigue, Hard of hearing, History of prostate cancer, Hypertension, Hyperthyroidism, Impaired memory, SOB (shortness of breath), and Vitamin D deficiency.  He has a past surgical history that includes Cardiac surgery and hip surgery (Right, 2/28/2025).  Hisfamily history includes Heart Disease in his father and mother.  He reports that he quit smoking about 50 years ago. His smoking use included cigarettes. He has never used smokeless tobacco. He reports current alcohol use. He reports that he does not use drugs.  Medications     Discharge Medication List as of 3/6/2025 10:25 AM        CONTINUE these medications which have NOT CHANGED    Details   sacubitril-valsartan (ENTRESTO)

## 2025-02-27 NOTE — PLAN OF CARE
Right hip femoral neck fracture  Right hip hemiarthroplasty tomorrow (Friday)  NPO at midnight  Hold anticoagulation  Pending medicine eval and optimization

## 2025-02-27 NOTE — ED PROVIDER NOTES
ED Attending Attestation Note     Date of evaluation: 2/27/2025    I have discussed the case with the resident. I have personally performed a history, physical exam, and my own medical decision making. I have reviewed the note and agree with the findings and plan.     INITIAL VITALS: BP: 121/68, Temp: 98.3 °F (36.8 °C), Pulse: 64, Respirations: 20, SpO2: 100 %     MDM:  My assessment reveals a well-appearing 89-year-old male presenting with right hip pain after a mechanical fall downstairs.  On examination, he does have tenderness to palpation of the right hip.  X-ray reveals a comminuted femoral neck fracture.  Orthopedics will be consulted to consider operative intervention and the patient will be admitted to medicine for pain control and further care       Haile Villatoro MD  02/27/25 [id number]

## 2025-02-28 ENCOUNTER — ANESTHESIA EVENT (OUTPATIENT)
Dept: OPERATING ROOM | Age: 89
End: 2025-02-28
Payer: MEDICARE

## 2025-02-28 ENCOUNTER — ANESTHESIA (OUTPATIENT)
Dept: OPERATING ROOM | Age: 89
End: 2025-02-28
Payer: MEDICARE

## 2025-02-28 ENCOUNTER — TELEPHONE (OUTPATIENT)
Dept: INTERNAL MEDICINE CLINIC | Age: 89
End: 2025-02-28

## 2025-02-28 ENCOUNTER — APPOINTMENT (OUTPATIENT)
Dept: GENERAL RADIOLOGY | Age: 89
DRG: 522 | End: 2025-02-28
Payer: MEDICARE

## 2025-02-28 DIAGNOSIS — I10 ESSENTIAL HYPERTENSION: ICD-10-CM

## 2025-02-28 LAB
ABO + RH BLD: NORMAL
ANTIBODY SCREEN: NORMAL
EKG ATRIAL RATE: 73 BPM
EKG DIAGNOSIS: NORMAL
EKG P AXIS: 81 DEGREES
EKG P-R INTERVAL: 172 MS
EKG Q-T INTERVAL: 460 MS
EKG QRS DURATION: 152 MS
EKG QTC CALCULATION (BAZETT): 506 MS
EKG R AXIS: 145 DEGREES
EKG T AXIS: -18 DEGREES
EKG VENTRICULAR RATE: 73 BPM
GLUCOSE BLD-MCNC: 92 MG/DL (ref 70–99)
GLUCOSE BLD-MCNC: 96 MG/DL (ref 70–99)
PERFORMED ON: NORMAL
PERFORMED ON: NORMAL

## 2025-02-28 PROCEDURE — 2500000003 HC RX 250 WO HCPCS: Performed by: ORTHOPAEDIC SURGERY

## 2025-02-28 PROCEDURE — 6370000000 HC RX 637 (ALT 250 FOR IP): Performed by: PHYSICIAN ASSISTANT

## 2025-02-28 PROCEDURE — 6360000002 HC RX W HCPCS: Performed by: ORTHOPAEDIC SURGERY

## 2025-02-28 PROCEDURE — 3600000014 HC SURGERY LEVEL 4 ADDTL 15MIN: Performed by: ORTHOPAEDIC SURGERY

## 2025-02-28 PROCEDURE — 3600000004 HC SURGERY LEVEL 4 BASE: Performed by: ORTHOPAEDIC SURGERY

## 2025-02-28 PROCEDURE — 2580000003 HC RX 258: Performed by: ORTHOPAEDIC SURGERY

## 2025-02-28 PROCEDURE — 6360000002 HC RX W HCPCS: Performed by: PHYSICIAN ASSISTANT

## 2025-02-28 PROCEDURE — 6360000002 HC RX W HCPCS

## 2025-02-28 PROCEDURE — 6370000000 HC RX 637 (ALT 250 FOR IP): Performed by: ANESTHESIOLOGY

## 2025-02-28 PROCEDURE — 2580000003 HC RX 258

## 2025-02-28 PROCEDURE — 99223 1ST HOSP IP/OBS HIGH 75: CPT | Performed by: ORTHOPAEDIC SURGERY

## 2025-02-28 PROCEDURE — 7100000001 HC PACU RECOVERY - ADDTL 15 MIN: Performed by: ORTHOPAEDIC SURGERY

## 2025-02-28 PROCEDURE — 6360000002 HC RX W HCPCS: Performed by: ANESTHESIOLOGY

## 2025-02-28 PROCEDURE — 72170 X-RAY EXAM OF PELVIS: CPT

## 2025-02-28 PROCEDURE — C1713 ANCHOR/SCREW BN/BN,TIS/BN: HCPCS | Performed by: ORTHOPAEDIC SURGERY

## 2025-02-28 PROCEDURE — 27236 TREAT THIGH FRACTURE: CPT | Performed by: ORTHOPAEDIC SURGERY

## 2025-02-28 PROCEDURE — C1776 JOINT DEVICE (IMPLANTABLE): HCPCS | Performed by: ORTHOPAEDIC SURGERY

## 2025-02-28 PROCEDURE — P9045 ALBUMIN (HUMAN), 5%, 250 ML: HCPCS

## 2025-02-28 PROCEDURE — 1200000000 HC SEMI PRIVATE

## 2025-02-28 PROCEDURE — 3700000000 HC ANESTHESIA ATTENDED CARE: Performed by: ORTHOPAEDIC SURGERY

## 2025-02-28 PROCEDURE — 86850 RBC ANTIBODY SCREEN: CPT

## 2025-02-28 PROCEDURE — 2500000003 HC RX 250 WO HCPCS: Performed by: PHYSICIAN ASSISTANT

## 2025-02-28 PROCEDURE — 2720000010 HC SURG SUPPLY STERILE: Performed by: ORTHOPAEDIC SURGERY

## 2025-02-28 PROCEDURE — 2709999900 HC NON-CHARGEABLE SUPPLY: Performed by: ORTHOPAEDIC SURGERY

## 2025-02-28 PROCEDURE — 2500000003 HC RX 250 WO HCPCS

## 2025-02-28 PROCEDURE — 2580000003 HC RX 258: Performed by: PHYSICIAN ASSISTANT

## 2025-02-28 PROCEDURE — 86900 BLOOD TYPING SEROLOGIC ABO: CPT

## 2025-02-28 PROCEDURE — 3700000001 HC ADD 15 MINUTES (ANESTHESIA): Performed by: ORTHOPAEDIC SURGERY

## 2025-02-28 PROCEDURE — 2580000003 HC RX 258: Performed by: NURSE PRACTITIONER

## 2025-02-28 PROCEDURE — 0SRR0J9 REPLACEMENT OF RIGHT HIP JOINT, FEMORAL SURFACE WITH SYNTHETIC SUBSTITUTE, CEMENTED, OPEN APPROACH: ICD-10-PCS | Performed by: ORTHOPAEDIC SURGERY

## 2025-02-28 PROCEDURE — 86901 BLOOD TYPING SEROLOGIC RH(D): CPT

## 2025-02-28 PROCEDURE — 7100000000 HC PACU RECOVERY - FIRST 15 MIN: Performed by: ORTHOPAEDIC SURGERY

## 2025-02-28 PROCEDURE — 6370000000 HC RX 637 (ALT 250 FOR IP): Performed by: INTERNAL MEDICINE

## 2025-02-28 DEVICE — KIT HIP IMPL CAPPED H4 HEMI UNI/BIPOLAR H4ZIMMERBIOMET: Type: IMPLANTABLE DEVICE | Site: HIP | Status: FUNCTIONAL

## 2025-02-28 DEVICE — IMPLANT FEMORAL HEAD ZB 12/14 COCR HD: Type: IMPLANTABLE DEVICE | Site: HIP | Status: FUNCTIONAL

## 2025-02-28 DEVICE — IMPLANTABLE DEVICE: Type: IMPLANTABLE DEVICE | Site: HIP | Status: FUNCTIONAL

## 2025-02-28 DEVICE — AVENIR® STANDARD STEM CEMENTED 4
Type: IMPLANTABLE DEVICE | Site: HIP | Status: FUNCTIONAL
Brand: AVENIR®

## 2025-02-28 RX ORDER — NALOXONE HYDROCHLORIDE 0.4 MG/ML
INJECTION, SOLUTION INTRAMUSCULAR; INTRAVENOUS; SUBCUTANEOUS PRN
Status: DISCONTINUED | OUTPATIENT
Start: 2025-02-28 | End: 2025-02-28 | Stop reason: HOSPADM

## 2025-02-28 RX ORDER — SODIUM CHLORIDE 9 MG/ML
INJECTION, SOLUTION INTRAVENOUS PRN
Status: DISCONTINUED | OUTPATIENT
Start: 2025-02-28 | End: 2025-03-06 | Stop reason: HOSPADM

## 2025-02-28 RX ORDER — SODIUM CHLORIDE, SODIUM LACTATE, POTASSIUM CHLORIDE, CALCIUM CHLORIDE 600; 310; 30; 20 MG/100ML; MG/100ML; MG/100ML; MG/100ML
INJECTION, SOLUTION INTRAVENOUS CONTINUOUS
Status: DISCONTINUED | OUTPATIENT
Start: 2025-02-28 | End: 2025-03-01

## 2025-02-28 RX ORDER — LIDOCAINE HYDROCHLORIDE 20 MG/ML
INJECTION, SOLUTION INTRAVENOUS
Status: DISCONTINUED | OUTPATIENT
Start: 2025-02-28 | End: 2025-02-28 | Stop reason: SDUPTHER

## 2025-02-28 RX ORDER — VANCOMYCIN HYDROCHLORIDE 1 G/20ML
INJECTION, POWDER, LYOPHILIZED, FOR SOLUTION INTRAVENOUS PRN
Status: DISCONTINUED | OUTPATIENT
Start: 2025-02-28 | End: 2025-02-28 | Stop reason: HOSPADM

## 2025-02-28 RX ORDER — MORPHINE SULFATE 2 MG/ML
2 INJECTION, SOLUTION INTRAMUSCULAR; INTRAVENOUS
Status: DISCONTINUED | OUTPATIENT
Start: 2025-02-28 | End: 2025-03-04

## 2025-02-28 RX ORDER — SODIUM CHLORIDE 9 MG/ML
INJECTION, SOLUTION INTRAVENOUS PRN
Status: DISCONTINUED | OUTPATIENT
Start: 2025-02-28 | End: 2025-02-28 | Stop reason: HOSPADM

## 2025-02-28 RX ORDER — ROCURONIUM BROMIDE 10 MG/ML
INJECTION, SOLUTION INTRAVENOUS
Status: DISCONTINUED | OUTPATIENT
Start: 2025-02-28 | End: 2025-02-28 | Stop reason: SDUPTHER

## 2025-02-28 RX ORDER — GLUCAGON 1 MG/ML
1 KIT INJECTION PRN
Status: DISCONTINUED | OUTPATIENT
Start: 2025-02-28 | End: 2025-03-06 | Stop reason: HOSPADM

## 2025-02-28 RX ORDER — INSULIN LISPRO 100 [IU]/ML
0.08 INJECTION, SOLUTION INTRAVENOUS; SUBCUTANEOUS
Status: DISCONTINUED | OUTPATIENT
Start: 2025-02-28 | End: 2025-03-06 | Stop reason: HOSPADM

## 2025-02-28 RX ORDER — CEFAZOLIN SODIUM 1 G/3ML
INJECTION, POWDER, FOR SOLUTION INTRAMUSCULAR; INTRAVENOUS
Status: DISCONTINUED | OUTPATIENT
Start: 2025-02-28 | End: 2025-02-28 | Stop reason: SDUPTHER

## 2025-02-28 RX ORDER — OXYCODONE HYDROCHLORIDE 5 MG/1
5 TABLET ORAL EVERY 4 HOURS PRN
Status: DISCONTINUED | OUTPATIENT
Start: 2025-02-28 | End: 2025-03-03 | Stop reason: SDUPTHER

## 2025-02-28 RX ORDER — PROPOFOL 10 MG/ML
INJECTION, EMULSION INTRAVENOUS
Status: DISCONTINUED | OUTPATIENT
Start: 2025-02-28 | End: 2025-02-28 | Stop reason: SDUPTHER

## 2025-02-28 RX ORDER — MAGNESIUM HYDROXIDE 1200 MG/15ML
LIQUID ORAL CONTINUOUS PRN
Status: DISCONTINUED | OUTPATIENT
Start: 2025-02-28 | End: 2025-02-28 | Stop reason: HOSPADM

## 2025-02-28 RX ORDER — ACETAMINOPHEN 325 MG/1
650 TABLET ORAL EVERY 6 HOURS
Status: DISCONTINUED | OUTPATIENT
Start: 2025-02-28 | End: 2025-03-06 | Stop reason: HOSPADM

## 2025-02-28 RX ORDER — INSULIN LISPRO 100 [IU]/ML
0-8 INJECTION, SOLUTION INTRAVENOUS; SUBCUTANEOUS
Status: DISCONTINUED | OUTPATIENT
Start: 2025-02-28 | End: 2025-03-06 | Stop reason: HOSPADM

## 2025-02-28 RX ORDER — ASPIRIN 81 MG/1
81 TABLET, CHEWABLE ORAL DAILY
Status: DISCONTINUED | OUTPATIENT
Start: 2025-02-28 | End: 2025-03-06 | Stop reason: HOSPADM

## 2025-02-28 RX ORDER — SODIUM CHLORIDE 0.9 % (FLUSH) 0.9 %
5-40 SYRINGE (ML) INJECTION PRN
Status: DISCONTINUED | OUTPATIENT
Start: 2025-02-28 | End: 2025-03-06 | Stop reason: HOSPADM

## 2025-02-28 RX ORDER — MORPHINE SULFATE 4 MG/ML
4 INJECTION INTRAVENOUS
Status: DISCONTINUED | OUTPATIENT
Start: 2025-02-28 | End: 2025-03-04

## 2025-02-28 RX ORDER — ONDANSETRON 2 MG/ML
INJECTION INTRAMUSCULAR; INTRAVENOUS
Status: DISCONTINUED | OUTPATIENT
Start: 2025-02-28 | End: 2025-02-28 | Stop reason: SDUPTHER

## 2025-02-28 RX ORDER — PHENYLEPHRINE HYDROCHLORIDE 10 MG/ML
INJECTION INTRAVENOUS
Status: DISCONTINUED | OUTPATIENT
Start: 2025-02-28 | End: 2025-02-28 | Stop reason: SDUPTHER

## 2025-02-28 RX ORDER — METHOCARBAMOL 100 MG/ML
INJECTION, SOLUTION INTRAMUSCULAR; INTRAVENOUS
Status: DISCONTINUED | OUTPATIENT
Start: 2025-02-28 | End: 2025-02-28 | Stop reason: SDUPTHER

## 2025-02-28 RX ORDER — FENTANYL CITRATE 50 UG/ML
25 INJECTION, SOLUTION INTRAMUSCULAR; INTRAVENOUS EVERY 5 MIN PRN
Status: DISCONTINUED | OUTPATIENT
Start: 2025-02-28 | End: 2025-02-28 | Stop reason: HOSPADM

## 2025-02-28 RX ORDER — HYDRALAZINE HYDROCHLORIDE 20 MG/ML
10 INJECTION INTRAMUSCULAR; INTRAVENOUS
Status: DISCONTINUED | OUTPATIENT
Start: 2025-02-28 | End: 2025-02-28 | Stop reason: HOSPADM

## 2025-02-28 RX ORDER — ETOMIDATE 2 MG/ML
INJECTION INTRAVENOUS
Status: DISCONTINUED | OUTPATIENT
Start: 2025-02-28 | End: 2025-02-28 | Stop reason: SDUPTHER

## 2025-02-28 RX ORDER — HYDROMORPHONE HYDROCHLORIDE 1 MG/ML
0.5 INJECTION, SOLUTION INTRAMUSCULAR; INTRAVENOUS; SUBCUTANEOUS EVERY 5 MIN PRN
Status: DISCONTINUED | OUTPATIENT
Start: 2025-02-28 | End: 2025-02-28 | Stop reason: HOSPADM

## 2025-02-28 RX ORDER — SODIUM CHLORIDE 9 MG/ML
INJECTION, SOLUTION INTRAVENOUS
Status: DISCONTINUED | OUTPATIENT
Start: 2025-02-28 | End: 2025-02-28 | Stop reason: SDUPTHER

## 2025-02-28 RX ORDER — SODIUM CHLORIDE 0.9 % (FLUSH) 0.9 %
5-40 SYRINGE (ML) INJECTION PRN
Status: DISCONTINUED | OUTPATIENT
Start: 2025-02-28 | End: 2025-02-28 | Stop reason: HOSPADM

## 2025-02-28 RX ORDER — SODIUM CHLORIDE 0.9 % (FLUSH) 0.9 %
5-40 SYRINGE (ML) INJECTION EVERY 12 HOURS SCHEDULED
Status: DISCONTINUED | OUTPATIENT
Start: 2025-02-28 | End: 2025-02-28 | Stop reason: HOSPADM

## 2025-02-28 RX ORDER — FENTANYL CITRATE 50 UG/ML
INJECTION, SOLUTION INTRAMUSCULAR; INTRAVENOUS
Status: DISCONTINUED | OUTPATIENT
Start: 2025-02-28 | End: 2025-02-28 | Stop reason: SDUPTHER

## 2025-02-28 RX ORDER — 0.9 % SODIUM CHLORIDE 0.9 %
500 INTRAVENOUS SOLUTION INTRAVENOUS ONCE
Status: COMPLETED | OUTPATIENT
Start: 2025-02-28 | End: 2025-02-28

## 2025-02-28 RX ORDER — POLYETHYLENE GLYCOL 3350 17 G/17G
17 POWDER, FOR SOLUTION ORAL DAILY
Status: DISCONTINUED | OUTPATIENT
Start: 2025-02-28 | End: 2025-03-06 | Stop reason: HOSPADM

## 2025-02-28 RX ORDER — SENNOSIDES 8.8 MG/5ML
5 LIQUID ORAL 2 TIMES DAILY PRN
Status: DISCONTINUED | OUTPATIENT
Start: 2025-02-28 | End: 2025-03-06 | Stop reason: HOSPADM

## 2025-02-28 RX ORDER — DEXAMETHASONE SODIUM PHOSPHATE 4 MG/ML
INJECTION, SOLUTION INTRA-ARTICULAR; INTRALESIONAL; INTRAMUSCULAR; INTRAVENOUS; SOFT TISSUE
Status: DISCONTINUED | OUTPATIENT
Start: 2025-02-28 | End: 2025-02-28 | Stop reason: SDUPTHER

## 2025-02-28 RX ORDER — PROCHLORPERAZINE EDISYLATE 5 MG/ML
5 INJECTION INTRAMUSCULAR; INTRAVENOUS
Status: DISCONTINUED | OUTPATIENT
Start: 2025-02-28 | End: 2025-02-28 | Stop reason: HOSPADM

## 2025-02-28 RX ORDER — SODIUM CHLORIDE 0.9 % (FLUSH) 0.9 %
5-40 SYRINGE (ML) INJECTION EVERY 12 HOURS SCHEDULED
Status: DISCONTINUED | OUTPATIENT
Start: 2025-02-28 | End: 2025-03-06 | Stop reason: HOSPADM

## 2025-02-28 RX ORDER — OXYCODONE HYDROCHLORIDE 5 MG/1
10 TABLET ORAL EVERY 4 HOURS PRN
Status: DISCONTINUED | OUTPATIENT
Start: 2025-02-28 | End: 2025-03-03 | Stop reason: SDUPTHER

## 2025-02-28 RX ORDER — DEXTROSE MONOHYDRATE 100 MG/ML
INJECTION, SOLUTION INTRAVENOUS CONTINUOUS PRN
Status: DISCONTINUED | OUTPATIENT
Start: 2025-02-28 | End: 2025-03-06 | Stop reason: HOSPADM

## 2025-02-28 RX ORDER — ALBUMIN HUMAN 50 G/1000ML
SOLUTION INTRAVENOUS
Status: DISCONTINUED | OUTPATIENT
Start: 2025-02-28 | End: 2025-02-28 | Stop reason: SDUPTHER

## 2025-02-28 RX ORDER — HALOPERIDOL 5 MG/ML
1 INJECTION INTRAMUSCULAR
Status: DISCONTINUED | OUTPATIENT
Start: 2025-02-28 | End: 2025-02-28 | Stop reason: HOSPADM

## 2025-02-28 RX ORDER — CALCIUM CHLORIDE 100 MG/ML
INJECTION INTRAVENOUS; INTRAVENTRICULAR
Status: DISCONTINUED | OUTPATIENT
Start: 2025-02-28 | End: 2025-02-28 | Stop reason: SDUPTHER

## 2025-02-28 RX ORDER — INSULIN GLARGINE 100 [IU]/ML
0.25 INJECTION, SOLUTION SUBCUTANEOUS DAILY
Status: DISCONTINUED | OUTPATIENT
Start: 2025-02-28 | End: 2025-02-28

## 2025-02-28 RX ORDER — SODIUM CHLORIDE, SODIUM LACTATE, POTASSIUM CHLORIDE, CALCIUM CHLORIDE 600; 310; 30; 20 MG/100ML; MG/100ML; MG/100ML; MG/100ML
INJECTION, SOLUTION INTRAVENOUS
Status: DISCONTINUED | OUTPATIENT
Start: 2025-02-28 | End: 2025-02-28 | Stop reason: SDUPTHER

## 2025-02-28 RX ORDER — METOPROLOL SUCCINATE 25 MG/1
25 TABLET, EXTENDED RELEASE ORAL DAILY
Qty: 90 TABLET | Refills: 3 | Status: SHIPPED | OUTPATIENT
Start: 2025-02-28 | End: 2025-03-03 | Stop reason: HOSPADM

## 2025-02-28 RX ADMIN — OXYCODONE 5 MG: 5 TABLET ORAL at 18:09

## 2025-02-28 RX ADMIN — METOPROLOL SUCCINATE 12.5 MG: 25 TABLET, EXTENDED RELEASE ORAL at 09:01

## 2025-02-28 RX ADMIN — METHOCARBAMOL 200 MG: 100 INJECTION INTRAMUSCULAR; INTRAVENOUS at 13:51

## 2025-02-28 RX ADMIN — CALCIUM CHLORIDE 0.2 G: 100 INJECTION, SOLUTION INTRAVENOUS at 13:27

## 2025-02-28 RX ADMIN — SUGAMMADEX 200 MG: 100 INJECTION, SOLUTION INTRAVENOUS at 13:46

## 2025-02-28 RX ADMIN — CALCIUM CHLORIDE 0.2 G: 100 INJECTION, SOLUTION INTRAVENOUS at 13:31

## 2025-02-28 RX ADMIN — ACETAMINOPHEN 650 MG: 325 TABLET, FILM COATED ORAL at 18:10

## 2025-02-28 RX ADMIN — CEFAZOLIN SODIUM 2 G: 1 POWDER, FOR SOLUTION INTRAMUSCULAR; INTRAVENOUS at 12:05

## 2025-02-28 RX ADMIN — CALCIUM CHLORIDE 0.2 G: 100 INJECTION, SOLUTION INTRAVENOUS at 13:23

## 2025-02-28 RX ADMIN — ACETAMINOPHEN 650 MG: 325 TABLET, FILM COATED ORAL at 21:29

## 2025-02-28 RX ADMIN — METHOCARBAMOL 100 MG: 100 INJECTION INTRAMUSCULAR; INTRAVENOUS at 13:30

## 2025-02-28 RX ADMIN — CALCIUM CHLORIDE 0.2 G: 100 INJECTION, SOLUTION INTRAVENOUS at 13:12

## 2025-02-28 RX ADMIN — WATER 2000 MG: 1 INJECTION INTRAMUSCULAR; INTRAVENOUS; SUBCUTANEOUS at 21:30

## 2025-02-28 RX ADMIN — DEXAMETHASONE SODIUM PHOSPHATE 4 MG: 4 INJECTION INTRA-ARTICULAR; INTRALESIONAL; INTRAMUSCULAR; INTRAVENOUS; SOFT TISSUE at 12:42

## 2025-02-28 RX ADMIN — FENTANYL CITRATE 50 MCG: 50 INJECTION, SOLUTION INTRAMUSCULAR; INTRAVENOUS at 12:44

## 2025-02-28 RX ADMIN — ALBUMIN (HUMAN) 12.5 G: 12.5 SOLUTION INTRAVENOUS at 13:11

## 2025-02-28 RX ADMIN — TRANEXAMIC ACID 1000 MG: 100 INJECTION, SOLUTION INTRAVENOUS at 12:05

## 2025-02-28 RX ADMIN — ROCURONIUM BROMIDE 80 MG: 10 INJECTION, SOLUTION INTRAVENOUS at 11:54

## 2025-02-28 RX ADMIN — SODIUM CHLORIDE 500 ML: 0.9 INJECTION, SOLUTION INTRAVENOUS at 21:45

## 2025-02-28 RX ADMIN — METHOCARBAMOL 200 MG: 100 INJECTION INTRAMUSCULAR; INTRAVENOUS at 12:54

## 2025-02-28 RX ADMIN — PHENYLEPHRINE HYDROCHLORIDE 100 MCG: 10 INJECTION, SOLUTION INTRAVENOUS at 13:00

## 2025-02-28 RX ADMIN — METHOCARBAMOL 200 MG: 100 INJECTION INTRAMUSCULAR; INTRAVENOUS at 13:05

## 2025-02-28 RX ADMIN — LIDOCAINE HYDROCHLORIDE 50 MG: 20 INJECTION, SOLUTION INTRAVENOUS at 11:49

## 2025-02-28 RX ADMIN — ETOMIDATE 8 MG: 2 INJECTION, SOLUTION INTRAVENOUS at 11:50

## 2025-02-28 RX ADMIN — PHENYLEPHRINE HYDROCHLORIDE 100 MCG: 10 INJECTION, SOLUTION INTRAVENOUS at 13:18

## 2025-02-28 RX ADMIN — ONDANSETRON 4 MG: 2 INJECTION INTRAMUSCULAR; INTRAVENOUS at 12:42

## 2025-02-28 RX ADMIN — TAMSULOSIN HYDROCHLORIDE 0.4 MG: 0.4 CAPSULE ORAL at 09:03

## 2025-02-28 RX ADMIN — PROPOFOL 20 MG: 10 INJECTION, EMULSION INTRAVENOUS at 11:53

## 2025-02-28 RX ADMIN — FENTANYL CITRATE 25 MCG: 50 INJECTION, SOLUTION INTRAMUSCULAR; INTRAVENOUS at 11:49

## 2025-02-28 RX ADMIN — SODIUM CHLORIDE: 9 INJECTION, SOLUTION INTRAVENOUS at 11:42

## 2025-02-28 RX ADMIN — PHENYLEPHRINE HYDROCHLORIDE 100 MCG: 10 INJECTION, SOLUTION INTRAVENOUS at 13:06

## 2025-02-28 RX ADMIN — PROPOFOL 30 MG: 10 INJECTION, EMULSION INTRAVENOUS at 11:51

## 2025-02-28 RX ADMIN — METHOCARBAMOL 200 MG: 100 INJECTION INTRAMUSCULAR; INTRAVENOUS at 13:46

## 2025-02-28 RX ADMIN — PHENYLEPHRINE HYDROCHLORIDE 200 MCG: 10 INJECTION, SOLUTION INTRAVENOUS at 13:11

## 2025-02-28 RX ADMIN — SODIUM CHLORIDE, SODIUM LACTATE, POTASSIUM CHLORIDE, AND CALCIUM CHLORIDE: .6; .31; .03; .02 INJECTION, SOLUTION INTRAVENOUS at 11:42

## 2025-02-28 RX ADMIN — SODIUM CHLORIDE, SODIUM LACTATE, POTASSIUM CHLORIDE, AND CALCIUM CHLORIDE: .6; .31; .03; .02 INJECTION, SOLUTION INTRAVENOUS at 15:12

## 2025-02-28 RX ADMIN — METHOCARBAMOL 100 MG: 100 INJECTION INTRAMUSCULAR; INTRAVENOUS at 13:56

## 2025-02-28 RX ADMIN — PHENYLEPHRINE HYDROCHLORIDE 100 MCG: 10 INJECTION, SOLUTION INTRAVENOUS at 12:05

## 2025-02-28 RX ADMIN — ETOMIDATE 2 MG: 2 INJECTION, SOLUTION INTRAVENOUS at 11:52

## 2025-02-28 RX ADMIN — ASPIRIN 81 MG: 81 TABLET, CHEWABLE ORAL at 09:02

## 2025-02-28 RX ADMIN — HYDRALAZINE HYDROCHLORIDE 10 MG: 20 INJECTION INTRAMUSCULAR; INTRAVENOUS at 14:20

## 2025-02-28 RX ADMIN — FENTANYL CITRATE 25 MCG: 50 INJECTION, SOLUTION INTRAMUSCULAR; INTRAVENOUS at 12:38

## 2025-02-28 RX ADMIN — CALCIUM CHLORIDE 0.2 G: 100 INJECTION, SOLUTION INTRAVENOUS at 13:16

## 2025-02-28 ASSESSMENT — PAIN DESCRIPTION - LOCATION: LOCATION: HIP

## 2025-02-28 ASSESSMENT — PAIN - FUNCTIONAL ASSESSMENT: PAIN_FUNCTIONAL_ASSESSMENT: PREVENTS OR INTERFERES SOME ACTIVE ACTIVITIES AND ADLS

## 2025-02-28 ASSESSMENT — PAIN DESCRIPTION - ORIENTATION: ORIENTATION: RIGHT

## 2025-02-28 ASSESSMENT — PAIN SCALES - GENERAL
PAINLEVEL_OUTOF10: 0
PAINLEVEL_OUTOF10: 2

## 2025-02-28 ASSESSMENT — ENCOUNTER SYMPTOMS: SHORTNESS OF BREATH: 1

## 2025-02-28 ASSESSMENT — PAIN DESCRIPTION - DESCRIPTORS: DESCRIPTORS: ACHING;SORE

## 2025-02-28 NOTE — PLAN OF CARE
Problem: Discharge Planning  Goal: Discharge to home or other facility with appropriate resources  2/28/2025 0751 by Sophia Calvillo RN  Outcome: Progressing     Problem: Pain  Goal: Verbalizes/displays adequate comfort level or baseline comfort level  2/28/2025 0751 by Sophia Calvillo RN  Outcome: Progressing   Pt endorses pain- medication administered per MAR    Problem: Safety - Adult  Goal: Free from fall injury  2/28/2025 0751 by Sophia Calvillo RN  Outcome: Progressing   Standard safety measures in place- call light within reach

## 2025-02-28 NOTE — CONSULTS
Consultant: Claude Menchaca MD  From: hospitalist  Reason: Right hip fracture    Chief Complaint   Patient presents with    Fall     Pt presents to the Ed after a fall at home. Pt states he was trying to walk don the stairs on his front porch and fell about 3ft and is now complaining of right hip pain. Pt denies hitting head and loss of consciousness.        History of Present Illness      Harjinder Mcgee Jr is a 89 y.o. male who presents with RIGHT hip pain. Patient sustained a mechanical fall resulting in the injury.    Baseline functional level: He has a history of a CABG, Parkinson's, dementia, has a pacemaker defibrillator.  He is reasonably active but walks with a cane.      Past History       Past Medical History:   Diagnosis Date    Arthritis     Balance disorder     CAD (coronary artery disease)     Chest pain     Dental disease     Dizziness     Dyslipidemia     Fatigue     Hard of hearing     History of prostate cancer     Hypertension     Hyperthyroidism     Impaired memory     SOB (shortness of breath)     Vitamin D deficiency      Past Surgical History:   Procedure Laterality Date    CARDIAC SURGERY       Family History   Problem Relation Age of Onset    Heart Disease Mother     Heart Disease Father      Social History     Tobacco Use    Smoking status: Former     Current packs/day: 0.00     Types: Cigarettes     Quit date: 1975     Years since quittin.1    Smokeless tobacco: Never   Substance Use Topics    Alcohol use: Yes     Alcohol/week: 0.0 standard drinks of alcohol      No current facility-administered medications on file prior to encounter.     Current Outpatient Medications on File Prior to Encounter   Medication Sig Dispense Refill    sacubitril-valsartan (ENTRESTO) 24-26 MG per tablet TAKE 1/2 TABLET BY MOUTH TWICE DAILY 30 tablet 5    atorvastatin (LIPITOR) 40 MG tablet Take 1 tablet by mouth daily for cholesterol. 90 tablet 3    furosemide (LASIX) 40 MG tablet TAKE 1 TABLET BY MOUTH  intended.

## 2025-02-28 NOTE — PROGRESS NOTES
Patient returned to room 5513 from PACU Report received from PACU RN. YUANS on room 3L NC.. Patient oriented to room and call light. Rights and responsibilities provided to patient, and welcome packet provided to patient. 4 eyes skin assessment completed with 2nd RN.

## 2025-02-28 NOTE — PROGRESS NOTES
4 Eyes Skin Assessment     NAME:  Harjinder Mcgee Jr  YOB: 1935  MEDICAL RECORD NUMBER:  [id number]    The patient is being assessed for  Post-Op Surgical    I agree that at least one RN has performed a thorough Head to Toe Skin Assessment on the patient. ALL assessment sites listed below have been assessed.      Areas assessed by both nurses:    Head, Face, Ears, Shoulders, Back, Chest, Arms, Elbows, Hands, Sacrum. Buttock, Coccyx, Ischium, Legs. Feet and Heels, and Under Medical Devices         Does the Patient have a Wound? No noted wound(s)       Jaime Prevention initiated by RN: No  Wound Care Orders initiated by RN: No    Pressure Injury (Stage 3,4, Unstageable, DTI, NWPT, and Complex wounds) if present, place Wound referral order by RN under : No    New Ostomies, if present place, Ostomy referral order under : No     Nurse 1 eSignature: Electronically signed by Sophia Calvillo RN on 2/28/25 at 3:37 PM EST    **SHARE this note so that the co-signing nurse can place an eSignature**    Nurse 2 eSignature: Electronically signed by Wen Stover RN on 2/28/25 at 4:30 PM EST

## 2025-02-28 NOTE — ANESTHESIA PRE PROCEDURE
Department of Anesthesiology  Preprocedure Note       Name:  Harjinder Mcgee Jr   Age:  89 y.o.  :  1935                                          MRN:  [id number]         Date:  2025      Surgeon: Surgeon(s):  Claude Menchaca MD    Procedure: Procedure(s):  RIGHT HIP HEMIARTHROPLASTY    Medications prior to admission:   Prior to Admission medications    Medication Sig Start Date End Date Taking? Authorizing Provider   sacubitril-valsartan (ENTRESTO) 24-26 MG per tablet TAKE 1/2 TABLET BY MOUTH TWICE DAILY 2/15/25  Yes Jorge Will MD   atorvastatin (LIPITOR) 40 MG tablet Take 1 tablet by mouth daily for cholesterol. 24  Yes Jorge Will MD   furosemide (LASIX) 40 MG tablet TAKE 1 TABLET BY MOUTH DAILY AS NEEDED FOR LOWER EXTREMITY SWELLING 24  Yes Nhung Casper APRN - CNP   spironolactone (ALDACTONE) 25 MG tablet TAKE ONE-HALF TABLET BY MOUTH DAILY 10/8/24  Yes Jorge Will MD   tamsulosin (FLOMAX) 0.4 MG capsule TAKE 1 CAPSULE BY MOUTH DAILY 24  Yes Jorge Will MD   oxyBUTYnin (DITROPAN-XL) 5 MG extended release tablet TAKE 1 TABLET BY MOUTH DAILY 24  Yes Jorge Will MD   metoprolol succinate (TOPROL XL) 25 MG extended release tablet TAKE 1 TABLET BY MOUTH DAILY FOR BLOOD PRESSURE AND HEART  Patient taking differently: Take 0.5 tablets by mouth daily for blood pressure and heart. 10/25/23 2/27/25 Yes Jorge Will MD   aspirin 81 MG chewable tablet Take 1 tablet by mouth daily 22  Yes Paul Pabon MD       Current medications:    Current Facility-Administered Medications   Medication Dose Route Frequency Provider Last Rate Last Admin   • sodium chloride flush 0.9 % injection 5-40 mL  5-40 mL IntraVENous 2 times per day Rashi Wilder MD   10 mL at 25   • sodium chloride flush 0.9 % injection 5-40 mL  5-40 mL IntraVENous PRN Rashi Wilder MD       • 0.9 % sodium chloride infusion   IntraVENous PRN Rashi Wilder MD       • potassium chloride

## 2025-02-28 NOTE — PROGRESS NOTES
Pt admitted to the unit. VS taken and recorded. Spo2 maintained at RA. Swallow screening passed. Admission, head to toe and 4 eyes assessment done with charge RN. Pain medication given per MAR. Pt is on NPO. Pt oriented to the unit, call light. All fall precautions in place,call light within reach, free from fall injury. Plan of care ongoing.

## 2025-02-28 NOTE — PROGRESS NOTES
Patient is oriented to self only. Pt has been sleepy and disoriented post op.  VSS this shift on 2 L NC. Patient has endorsed no pain thus far.. Patient is tolerating PO diet. Pt has not ambulated this shift.  Voiding via external catheter. Patient updated on plan of care. Fall and safety precautions in place, call light within reach.

## 2025-02-28 NOTE — PROGRESS NOTES
V2.0  [facility] [place]      Name:  Harjinder Mcgee Jr /Age/Sex: 1935  (89 y.o. male)   MRN & CSN:  [id number] & [id number] Encounter Date/Time: 2025 12:24 PM EST    Location:  OR/NONE PCP: Jorge Will MD       Hospital Day: 2    Assessment and Plan:   Harjinder Mcgee Jr is a 89 y.o. male with pmh of  who presents with Closed right hip fracture, initial encounter ([facility])      Plan:      Harjinder Mcgee Jr is a 89 y.o. male with PMH significant for Parkinson's, dementia, HTN, HLD, CAD, s/p CABG (), ICMP, EF 15-20% (), on GDMT, echo (2023) EF 30-35%, s/p single chamber ICD (23, Dr. Arenas), NSVT noted on device who presents with a mechanical fall and Closed right hip fracture, initial encounter ([facility])     R femoral neck # post mechanical fall- POA   -Plan for surgical intervention today     2. ICMP/CAD/HFrEF- chronic   Resume BB  Blood pressure soft.  Continue to hold Entresto, Aldactone, Lasix  Overall appears euvolemic.     3. BPH- resume flomax    Diet Diet NPO   DVT Prophylaxis [] Lovenox, []  Heparin, [] SCDs, [] Ambulation,  [] Eliquis, [] Xarelto  [] Coumadin   Code Status Full Code   Disposition From:   Expected Disposition:   Estimated Date of Discharge:   Patient requires continued admission due to    Surrogate Decision Maker/ POA      Personally reviewed Lab Studies and Imaging       Subjective:     Chief Complaint: Fall (Pt presents to the Ed after a fall at home. Pt states he was trying to walk don the stairs on his front porch and fell about 3ft and is now complaining of right hip pain. Pt denies hitting head and loss of consciousness.)       Harjinder Mcgee Jr is a 89 y.o. male who presents with mechanical fall and right femoral neck fracture.  Send seen and examined in the morning.  He is alert and oriented.  Pain controlled.         Review of Systems:    Review of Systems    Negative for mentioned above    Objective:     Intake/Output Summary (Last 24 hours) at

## 2025-02-28 NOTE — PLAN OF CARE
Problem: Chronic Conditions and Co-morbidities  Goal: Patient's chronic conditions and co-morbidity symptoms are monitored and maintained or improved  Outcome: Progressing     Problem: Discharge Planning  Goal: Discharge to home or other facility with appropriate resources  Outcome: Progressing  Flowsheets (Taken 2/27/2025 2102)  Discharge to home or other facility with appropriate resources:   Identify barriers to discharge with patient and caregiver   Arrange for needed discharge resources and transportation as appropriate   Identify discharge learning needs (meds, wound care, etc)     Problem: Pain  Goal: Verbalizes/displays adequate comfort level or baseline comfort level  Outcome: Progressing     Problem: Safety - Adult  Goal: Free from fall injury  Outcome: Progressing     Problem: ABCDS Injury Assessment  Goal: Absence of physical injury  Outcome: Progressing

## 2025-02-28 NOTE — ED NOTES
Patient Name: Harjinder Mcgee Jr  : 1935 89 y.o.  MRN: [id number]  ED Room #: B22/B22-22     Chief complaint:   Chief Complaint   Patient presents with    Fall     Pt presents to the Ed after a fall at home. Pt states he was trying to walk don the stairs on his front porch and fell about 3ft and is now complaining of right hip pain. Pt denies hitting head and loss of consciousness.     Hospital Problem/Diagnosis:   Hospital Problems             Last Modified POA    * (Principal) Closed right hip fracture, initial encounter ([facility]) 2025 Yes         O2 Flow Rate:O2 Device: None (Room air)   (if applicable)  Cardiac Rhythm:   (if applicable)  Active LDA's:   Peripheral IV 25 Left;Posterior Forearm (Active)            How does patient ambulate? Unknown, did not assess in the Emergency Department    2. How does patient take pills? Whole with Water    3. Is patient alert? Alert    4. Is patient oriented? To Person, To Place, To Time, To Situation, and Follows Commands    5.   Patient arrived from:  home  Facility Name: ___________________________________________    6. If patient is disoriented or from a Skill Nursing Facility has family been notified of admission? Yes    7. Patient belongings? Belongings: Cell Phone, Wallet, and Clothing    Disposition of belongings? Kept with Patient     8. Any specific patient or family belongings/needs/dynamics?   a. Son at bedside    9. Miscellaneous comments/pending orders?  a. Pt presents to the Ed after a fall at home. Pt stated he was walking down his porch when he fell down onto his right hip. Pt found to have fracture of right femoral neck. Pt is alert and oriented to self, place, and situation. Pt unable to remember what year it is. Pt has not ambulated since being in the ED due to hip fracture. Pt has 22G IV in left forearm.       If there are any additional questions please reach out to the Emergency Department.      Chip Camejo RN  25

## 2025-02-28 NOTE — PROGRESS NOTES
Patient arrived to PACU post RIGHT HIP HEMIARTHROPLASTY - Right with Dr. Mancuso. VSS on arrival. CRNA gave PACU RN report at bedside stating no complications during procedure. Dressing to surgical site clean, dry and intact. Patient shows no signs of pain at this time. Will continue to monitor.

## 2025-02-28 NOTE — CARE COORDINATION
Case Management Assessment  Initial Evaluation    Date/Time of Evaluation: 2/28/2025 3:54 PM  Assessment Completed by: Bessie Chin RN    If patient is discharged prior to next notation, then this note serves as note for discharge by case management.    Patient Name: Harjinder Mcgee Jr                   YOB: 1935  Diagnosis: Fall, initial encounter [W19.XXXA]  Closed right hip fracture, initial encounter ([facility]) [S72.001A]  Closed displaced fracture of right femoral neck ([facility]) [S72.001A]                   Date / Time: 2/27/2025  4:02 PM    Patient Admission Status: Inpatient   Readmission Risk (Low < 19, Mod (19-27), High > 27): Readmission Risk Score: 10.6    Current PCP: Jorge Will MD  PCP verified by CM? No    Chart Reviewed: Yes      History Provided by: Child/Family  Patient Orientation: Alert and Oriented    Patient Cognition: Alert    Hospitalization in the last 30 days (Readmission):  No    If yes, Readmission Assessment in  Navigator will be completed.    Advance Directives:      Code Status: Full Code   Patient's Primary Decision Maker is: Legal Next of Kin    Primary Decision Maker: Van Mcgeela - Spouse - [phone]    Secondary Decision Maker: EverardoTammie - Child - [phone]    Secondary Decision Maker: EverardoThomas  Child - [phone]    Discharge Planning:    Patient lives with: Spouse/Significant Other Type of Home: House  Primary Care Giver: Self  Patient Support Systems include: Spouse/Significant Other, Family Members   Current Financial resources: Medicare  Current community resources: None  Current services prior to admission: None            Current DME:              Type of Home Care services:  None    ADLS  Prior functional level: Independent in ADLs/IADLs  Current functional level: Assistance with the following:, Mobility    PT AM-PAC:   /24  OT AM-PAC:   /24    Family can provide assistance at DC: Yes  Would you like Case Management to discuss the discharge plan

## 2025-02-28 NOTE — PROGRESS NOTES
PACU Transfer to Floor Note    Procedure(s):  RIGHT HIP HEMIARTHROPLASTY    Current Allergies: Patient has no known allergies.    Pt meets criteria as per Kathleen Score and ASPAN Standards to transfer to next phase of care.     No results for input(s): \"POCGLU\" in the last 72 hours.    Vitals:    02/28/25 1445   BP: (!) 158/88   Pulse: 91   Resp: 15   Temp: 97.5 °F (36.4 °C)   SpO2: 100%     Vitals within 20% of pt's admission vitals as per KATHLEEN SCORE    SpO2: 100 %    O2 Flow Rate (L/min): 3 L/min      Intake/Output Summary (Last 24 hours) at 2/28/2025 1447  Last data filed at 2/28/2025 1347  Gross per 24 hour   Intake 1115 ml   Output 300 ml   Net 815 ml       Pain assessment:  none    Pain Level: 0    Patient was assessed for alterations to skin integrity. There were not alterations observed.    Is patient incontinent: no    Patient has all belongings at discharge from PACU.    Handoff report given at bedside.   Family updated and directed to pt room 5550  **MURALI TRANSPORTED PATIENT TO UNIT.       2/28/2025 2:47 PM

## 2025-02-28 NOTE — H&P
V2.0  History and Physical      Name:  Harjinder Mcgee Jr /Age/Sex: 1935  (89 y.o. male)   MRN & CSN:  [id number] & [id number] Encounter Date/Time: 2025 10:48 PM EST   Location:  5513/5513-01 PCP: Jorge Will MD       Hospital Day: 1    Assessment and Plan:   Harjinder Mcgee Jr is a 89 y.o. male with PMH significant for Parkinson's, dementia, HTN, HLD, CAD, s/p CABG (), ICMP, EF 15-20% (), on GDMT, echo (2023) EF 30-35%, s/p single chamber ICD (23, Dr. Arenas), NSVT noted on device who presents with a mechanical fall and Closed right hip fracture, initial encounter ([facility])    R femoral neck # post mechanical fall- POA    NPO  Consult ortho  EKG/CXR reviewed  Medically cleared for ORIF of R hip #    2. Ischemic cardiomyopathy  Coronary artery disease  HFrEF- chronic     Resume BB  His blood pressure is soft and I am hesitant to resume all his home meds  Post operatively, GDMT can be resumed as tolerated    3. BPH- resume [facility]    Hospital Problems             Last Modified POA    * (Principal) Closed right hip fracture, initial encounter ([facility]) 2025 Yes     Disposition:   Current Living situation: home  Expected Disposition: SNF  Estimated D/C: TBD    Diet Diet NPO  ADULT DIET; Regular   DVT Prophylaxis [] Lovenox, []  Heparin, [] SCDs, [] Ambulation,  [] Eliquis, [] Xarelto, [] Coumadin   Code Status Full Code   Surrogate Decision Maker/ POA      Personally reviewed Lab Studies and Imaging     History from:     patient    History of Present Illness:     Chief Complaint: R hip pain    Harjinder Mcgee Jr is a 89 y.o. male with significant past medical history of ischemic cardiomyopathy with EF of 35%, status post pacemaker, coronary disease status post CABG, hypertension, hyperlipidemia presents with a mechanical fall and right hip pain and imaging consistent with right femoral neck fracture and hence this admission  Labs are stable  Chest x-ray is normal  EKG is at baseline     Review of  DO        Electronically signed by MODESTO HANSON MD on 2/27/2025 at 10:48 PM

## 2025-02-28 NOTE — PLAN OF CARE
[facility] [place] Hospitalist brief note  Consult received.  Case reviewed with ER physician- admit for R femoral neck #    Full note to follow.    Jorge Will MD    Thanks  MODESTO HANSON MD

## 2025-02-28 NOTE — OP NOTE
Orthopaedic Surgery  Operative Report      Patient Name:  Harjinder Mcgee Jr  Patient :  1935  MRN: [id number]    Date: 25     Pre-operative Diagnosis:   S72.041A Displaced femoral neck fracture    Post-operative Diagnosis:    Same    Procedure: RIGHT  12053 Open Treatment Femoral neck fracture with Hemiarthroplasty    Surgeon:  Surgeons and Role:     * Claude Menchaca MD - Primary    Assistant: Circulator: Froylan Willett RN  Surgical Assistant: Ayo Wright; Dejah Kumari  Relief Circulator: Cole Cabrera RN  Relief Scrub: Roula Chavez  Scrub Person First: Cristy Short Surgical Assistant: Tereza Anders    Anesthesia: General endotracheal anesthesia and Intraoperative local infiltration - Exparel/marcaine solution    Estimated blood loss: 300    Specimens: * No specimens in log *    Complications: None    Drains: None    Condition: Stable    Implants:   Perfecto size 4 STD Avenir cemented stem with Simplex cement, -3.5 x 53 bipolar bearing    Findings:   1. Displaced femoral neck fracture  2. Osteoporosis    Indications:   The patient sustained a fall and has a right hip femoral neck fracture.  Patient has significant pain associated with this injury and has limited mobility as a result. Patient's preoperative comorbidities were optimized as best as possible. Patient has high perioperative risk for morbidity, mortality, and major cardiopulmonary event due to \"age, frailty, existing cardiac disease, and altered mentation Patient and family understood the risk benefits and alternatives in detail and wanted to proceed with the above operation.    Procedure Details:   I marked the surgical site of the right hip for surgery.  He was taken back to the operating theater laid supine the table the bony prominences well-padded.  General anesthesia was induced.  We transferred the patient to the operating table, turned lateral with axillary roll. All dependent bony prominences were well padded.

## 2025-02-28 NOTE — PROGRESS NOTES
4 Eyes Skin Assessment     NAME:  Harjinder Mcgee Jr  YOB: 1935  MEDICAL RECORD NUMBER:  [id number]    The patient is being assessed for  Admission    I agree that at least one RN has performed a thorough Head to Toe Skin Assessment on the patient. ALL assessment sites listed below have been assessed.      Areas assessed by both nurses:    Head, Face, Ears, Shoulders, Back, Chest, Arms, Elbows, Hands, Sacrum. Buttock, Coccyx, Ischium, Legs. Feet and Heels, Under Medical Devices , and Other na   Blanchable redness to the sacrum       Does the Patient have a Wound? No noted wound(s)       Jaime Prevention initiated by RN: Yes  Wound Care Orders initiated by RN: No    Pressure Injury (Stage 3,4, Unstageable, DTI, NWPT, and Complex wounds) if present, place Wound referral order by RN under : No    New Ostomies, if present place, Ostomy referral order under : No     Nurse 1 eSignature: Electronically signed by Karie Escoto RN on 2/27/25 at 9:44 PM EST    **SHARE this note so that the co-signing nurse can place an eSignature**    Nurse 2 eSignature: {Esignature:[id number]}

## 2025-02-28 NOTE — ANESTHESIA POSTPROCEDURE EVALUATION
Department of Anesthesiology  Postprocedure Note    Patient: Harjinder Mcgee Jr  MRN: [id number]  YOB: 1935  Date of evaluation: 2/28/2025    Procedure Summary       Date: 02/28/25 Room / Location: [address]    Anesthesia Start: 1142 Anesthesia Stop: 1357    Procedure: RIGHT HIP HEMIARTHROPLASTY (Right: Hip) Diagnosis:       Closed displaced fracture of right femoral neck (HCC)      (Closed displaced fracture of right femoral neck (HCC) [S72.001A])    Surgeons: Claude Menchaca MD Responsible Provider: Dimas Cameron MD    Anesthesia Type: General ASA Status: 3            Anesthesia Type: General    Patti Phase I: Patti Score: 9    Patti Phase II:      Anesthesia Post Evaluation    Patient location during evaluation: PACU  Patient participation: complete - patient participated  Level of consciousness: awake  Pain score: 0  Nausea & Vomiting: no nausea and no vomiting  Cardiovascular status: blood pressure returned to baseline  Respiratory status: acceptable  Hydration status: euvolemic  Pain management: adequate    No notable events documented.

## 2025-03-01 LAB
ANION GAP SERPL CALCULATED.3IONS-SCNC: 12 MMOL/L (ref 3–16)
BASOPHILS # BLD: 0 K/UL (ref 0–0.2)
BASOPHILS NFR BLD: 0.2 %
BUN SERPL-MCNC: 17 MG/DL (ref 7–20)
CALCIUM SERPL-MCNC: 9.2 MG/DL (ref 8.3–10.6)
CHLORIDE SERPL-SCNC: 104 MMOL/L (ref 99–110)
CO2 SERPL-SCNC: 25 MMOL/L (ref 21–32)
CREAT SERPL-MCNC: 1.1 MG/DL (ref 0.8–1.3)
DEPRECATED RDW RBC AUTO: 13.9 % (ref 12.4–15.4)
EOSINOPHIL # BLD: 0.1 K/UL (ref 0–0.6)
EOSINOPHIL NFR BLD: 0.6 %
GFR SERPLBLD CREATININE-BSD FMLA CKD-EPI: 65 ML/MIN/{1.73_M2}
GLUCOSE BLD-MCNC: 108 MG/DL (ref 70–99)
GLUCOSE BLD-MCNC: 153 MG/DL (ref 70–99)
GLUCOSE BLD-MCNC: 88 MG/DL (ref 70–99)
GLUCOSE BLD-MCNC: 91 MG/DL (ref 70–99)
GLUCOSE SERPL-MCNC: 170 MG/DL (ref 70–99)
HCT VFR BLD AUTO: 41.3 % (ref 40.5–52.5)
HGB BLD-MCNC: 13.1 G/DL (ref 13.5–17.5)
LYMPHOCYTES # BLD: 0.6 K/UL (ref 1–5.1)
LYMPHOCYTES NFR BLD: 4.7 %
MCH RBC QN AUTO: 28 PG (ref 26–34)
MCHC RBC AUTO-ENTMCNC: 31.8 G/DL (ref 31–36)
MCV RBC AUTO: 88.3 FL (ref 80–100)
MONOCYTES # BLD: 0.8 K/UL (ref 0–1.3)
MONOCYTES NFR BLD: 6.2 %
NEUTROPHILS # BLD: 12.1 K/UL (ref 1.7–7.7)
NEUTROPHILS NFR BLD: 88.3 %
PERFORMED ON: ABNORMAL
PERFORMED ON: ABNORMAL
PERFORMED ON: NORMAL
PERFORMED ON: NORMAL
PLATELET # BLD AUTO: 189 K/UL (ref 135–450)
PLATELET BLD QL SMEAR: ADEQUATE
PMV BLD AUTO: 9.4 FL (ref 5–10.5)
POTASSIUM SERPL-SCNC: 4.3 MMOL/L (ref 3.5–5.1)
RBC # BLD AUTO: 4.68 M/UL (ref 4.2–5.9)
SLIDE REVIEW: ABNORMAL
SODIUM SERPL-SCNC: 141 MMOL/L (ref 136–145)
WBC # BLD AUTO: 13.7 K/UL (ref 4–11)

## 2025-03-01 PROCEDURE — 36415 COLL VENOUS BLD VENIPUNCTURE: CPT

## 2025-03-01 PROCEDURE — 97535 SELF CARE MNGMENT TRAINING: CPT

## 2025-03-01 PROCEDURE — 97162 PT EVAL MOD COMPLEX 30 MIN: CPT

## 2025-03-01 PROCEDURE — 6370000000 HC RX 637 (ALT 250 FOR IP): Performed by: PHYSICIAN ASSISTANT

## 2025-03-01 PROCEDURE — 6360000002 HC RX W HCPCS: Performed by: NURSE PRACTITIONER

## 2025-03-01 PROCEDURE — 2500000003 HC RX 250 WO HCPCS: Performed by: PHYSICIAN ASSISTANT

## 2025-03-01 PROCEDURE — 85025 COMPLETE CBC W/AUTO DIFF WBC: CPT

## 2025-03-01 PROCEDURE — 97530 THERAPEUTIC ACTIVITIES: CPT

## 2025-03-01 PROCEDURE — 80048 BASIC METABOLIC PNL TOTAL CA: CPT

## 2025-03-01 PROCEDURE — 6360000002 HC RX W HCPCS: Performed by: PHYSICIAN ASSISTANT

## 2025-03-01 PROCEDURE — 1200000000 HC SEMI PRIVATE

## 2025-03-01 PROCEDURE — 97167 OT EVAL HIGH COMPLEX 60 MIN: CPT

## 2025-03-01 RX ORDER — ENOXAPARIN SODIUM 100 MG/ML
40 INJECTION SUBCUTANEOUS DAILY
Status: DISCONTINUED | OUTPATIENT
Start: 2025-03-01 | End: 2025-03-06 | Stop reason: HOSPADM

## 2025-03-01 RX ADMIN — ACETAMINOPHEN 650 MG: 325 TABLET, FILM COATED ORAL at 22:14

## 2025-03-01 RX ADMIN — SODIUM CHLORIDE, PRESERVATIVE FREE 10 ML: 5 INJECTION INTRAVENOUS at 09:23

## 2025-03-01 RX ADMIN — ACETAMINOPHEN 650 MG: 325 TABLET, FILM COATED ORAL at 15:29

## 2025-03-01 RX ADMIN — TAMSULOSIN HYDROCHLORIDE 0.4 MG: 0.4 CAPSULE ORAL at 09:22

## 2025-03-01 RX ADMIN — ASPIRIN 81 MG: 81 TABLET, CHEWABLE ORAL at 09:23

## 2025-03-01 RX ADMIN — METOPROLOL SUCCINATE 12.5 MG: 25 TABLET, EXTENDED RELEASE ORAL at 09:22

## 2025-03-01 RX ADMIN — SODIUM CHLORIDE, PRESERVATIVE FREE 10 ML: 5 INJECTION INTRAVENOUS at 22:14

## 2025-03-01 RX ADMIN — POLYETHYLENE GLYCOL 3350 17 G: 17 POWDER, FOR SOLUTION ORAL at 09:22

## 2025-03-01 RX ADMIN — SODIUM CHLORIDE, PRESERVATIVE FREE 10 ML: 5 INJECTION INTRAVENOUS at 22:15

## 2025-03-01 RX ADMIN — ACETAMINOPHEN 650 MG: 325 TABLET, FILM COATED ORAL at 09:22

## 2025-03-01 RX ADMIN — ENOXAPARIN SODIUM 40 MG: 100 INJECTION SUBCUTANEOUS at 14:17

## 2025-03-01 RX ADMIN — WATER 2000 MG: 1 INJECTION INTRAMUSCULAR; INTRAVENOUS; SUBCUTANEOUS at 03:58

## 2025-03-01 RX ADMIN — ACETAMINOPHEN 650 MG: 325 TABLET, FILM COATED ORAL at 03:58

## 2025-03-01 ASSESSMENT — PAIN SCALES - GENERAL
PAINLEVEL_OUTOF10: 0
PAINLEVEL_OUTOF10: 0

## 2025-03-01 ASSESSMENT — PAIN - FUNCTIONAL ASSESSMENT: PAIN_FUNCTIONAL_ASSESSMENT: PREVENTS OR INTERFERES WITH ALL ACTIVE AND SOME PASSIVE ACTIVITIES

## 2025-03-01 ASSESSMENT — PAIN DESCRIPTION - LOCATION: LOCATION: HIP

## 2025-03-01 ASSESSMENT — PAIN DESCRIPTION - ORIENTATION: ORIENTATION: RIGHT

## 2025-03-01 NOTE — PROGRESS NOTES
V2.0  [facility] Hospitalist Progress Note      Name:  Harjinder Mcgee Jr /Age/Sex: 1935  (89 y.o. male)   MRN & CSN:  [id number] & [id number] Encounter Date/Time: 3/1/2025 12:24 PM EST    Location:  5513/5513-01 PCP: Jorge Will MD       Hospital Day: 3    Assessment and Plan:   Harjinder Mcgee Jr is a 89 y.o. male with pmh of  who presents with Closed right hip fracture, initial encounter ([facility])      Plan:      Harjinder Mcgee Jr is a 89 y.o. male with PMH significant for Parkinson's, dementia, HTN, HLD, CAD, s/p CABG (), ICMP, EF 15-20% (), on GDMT, echo (2023) EF 30-35%, s/p single chamber ICD (23, Dr. Arenas), NSVT noted on device who presents with a mechanical fall and Closed right hip fracture, initial encounter ([facility])     R femoral neck # post mechanical fall- POA   -S/p open femoral neck fracture with hemiarthroplasty postop day 1.  Patient remains slightly sleepy PT OT recommended ARU.  Started on Lovenox 40 daily; follow-up in 2 to 3 weeks will discuss with case management.  No active abduction walker use for 6 weeks     2. ICMP/CAD/HFrEF- chronic   Resume BB  Blood pressure soft.  Continue to hold Entresto, Aldactone, Lasix  Overall appears euvolemic.     3. BPH- resume flomax    Diet ADULT DIET; Regular   DVT Prophylaxis [] Lovenox, []  Heparin, [] SCDs, [] Ambulation,  [] Eliquis, [] Xarelto  [] Coumadin   Code Status Full Code   Disposition From:   Expected Disposition:   Estimated Date of Discharge:   Patient requires continued admission due to    Surrogate Decision Maker/ POA      Personally reviewed Lab Studies and Imaging       Subjective:     Chief Complaint: Fall (Pt presents to the Ed after a fall at home. Pt states he was trying to walk don the stairs on his front porch and fell about 3ft and is now complaining of right hip pain. Pt denies hitting head and loss of consciousness.)       Harjinder Mcgee Jr is a 89 y.o. male who presents with mechanical fall and right femoral neck

## 2025-03-01 NOTE — PLAN OF CARE
Problem: Pain  Goal: Verbalizes/displays adequate comfort level or baseline comfort level  Outcome: Progressing   Denies pain at this time.     Problem: Skin/Tissue Integrity  Goal: Skin integrity remains intact  Description: 1.  Monitor for areas of redness and/or skin breakdown  2.  Assess vascular access sites hourly  3.  Every 4-6 hours minimum:  Change oxygen saturation probe site  4.  Every 4-6 hours:  If on nasal continuous positive airway pressure, respiratory therapy assess nares and determine need for appliance change or resting period  Outcome: Progressing     Problem: Safety - Adult  Goal: Free from fall injury  Outcome: Progressing

## 2025-03-01 NOTE — PROGRESS NOTES
Physical Therapy  Facility/Department: [facility] 5T ORTHO/NEURO  Physical Therapy Initial Assessment / Treatment    Name: Harjinder Mcgee Jr  : 1935  MRN: [id number]  Date of Service: 3/1/2025    Discharge Recommendations:  IP Rehab, Patient able to tolerate 3 hours of therapy per day   PT Equipment Recommendations  Equipment Needed: No  Other: defer to next level of care      Patient Diagnosis(es): The primary encounter diagnosis was Closed displaced fracture of right femoral neck (HCC). A diagnosis of Fall, initial encounter was also pertinent to this visit.  Past Medical History:  has a past medical history of Arthritis, Balance disorder, CAD (coronary artery disease), Chest pain, Dental disease, Dizziness, Dyslipidemia, Fatigue, Hard of hearing, History of prostate cancer, Hypertension, Hyperthyroidism, Impaired memory, SOB (shortness of breath), and Vitamin D deficiency.  Past Surgical History:  has a past surgical history that includes Cardiac surgery and hip surgery (Right, 2025).    Assessment  Body Structures, Functions, Activity Limitations Requiring Skilled Therapeutic Intervention: Decreased functional mobility   Assessment: Pt is 89 y.o. male POD #1 s/p right Open Treatment Femoral neck fracture with Hemiarthroplasty. Pt is from home and very indep at baseline including working part time and driving. Pt highly motivated to improve to return home to care for his wife. Demos good participation and effort but is limited by weakness. Requires 2 person assist for transfers and use of lift equipment for safe mobility out of bed. Unable to amb at this time. Rec ARU. Will follow.  Treatment Diagnosis: impaired gait and transfers  Therapy Prognosis: Good  Decision Making: Medium Complexity  Requires PT Follow-Up: Yes    Plan  Physical Therapy Plan  General Plan: 5-7 times per week  Current Treatment Recommendations: Strengthening, Functional mobility training, Gait training, Transfer training, Balance

## 2025-03-01 NOTE — PROGRESS NOTES
Occupational Therapy  Facility/Department: [facility] 5T ORTHO/NEURO  Occupational Therapy Initial Assessment and Treatment   Late Entry for 0853      Name: Harjinder Mcgee Jr  : 1935  MRN: [id number]  Date of Service: 3/1/2025    Discharge Recommendations:  IP Rehab  OT Equipment Recommendations  Equipment Needed: No (defer recommendations to discharge facility)    At baseline this patient was independent with functional mobility using cane & independent ADL's. The current hospitalization has resulted in the patient now being limited with all aspects of mobility, negatively impacting the patient's functional independence, ability to safely access their home environment, and ability to complete valued daily tasks and life roles. Harjinder Mcgee Jr is motivated for recovery and demonstrates the ability to tolerate inpatient rehabilitation 3 hours/day, 5 days/week for optimal return to functional independence, quality of life, and decreased caregiver burden.         Patient Diagnosis(es): The primary encounter diagnosis was Closed displaced fracture of right femoral neck (HCC). A diagnosis of Fall, initial encounter was also pertinent to this visit.  Past Medical History:  has a past medical history of Arthritis, Balance disorder, CAD (coronary artery disease), Chest pain, Dental disease, Dizziness, Dyslipidemia, Fatigue, Hard of hearing, History of prostate cancer, Hypertension, Hyperthyroidism, Impaired memory, SOB (shortness of breath), and Vitamin D deficiency.  Past Surgical History:  has a past surgical history that includes Cardiac surgery and hip surgery (Right, 2025).    Treatment Diagnosis: impaired ADLs/transfers s/p RIGHT HIP HEMIARTHROPLASTY.      Assessment  Performance deficits / Impairments: Decreased functional mobility ;Decreased ADL status;Decreased endurance;Decreased balance  Assessment: Presenting s/p fall down steps resulting in Right hip femoral neck fracture. Pt is POD#1 s/p RIGHT HIP

## 2025-03-01 NOTE — PROGRESS NOTES
Pt disoriented overnight, oriented to self only at this time. Pt can be oriented/disoriented at times. VSS on room air. Voids per purewick. Pt has not been able to ambulate this shift. Q2 turn as pt allows. Pt with some irritability and impulsivity at times. Denies pain at this time. Pt HR elevated at start of shift to 121, placed on tele, fluid bolus given. Current VS as below. Standard safety measures in place.   Vitals:    03/01/25 0358   BP: 110/69   Pulse: 95   Resp: 16   Temp: 98.2 °F (36.8 °C)   SpO2: 95%

## 2025-03-02 LAB
BASOPHILS # BLD: 0 K/UL (ref 0–0.2)
BASOPHILS NFR BLD: 0.2 %
DEPRECATED RDW RBC AUTO: 13.8 % (ref 12.4–15.4)
EOSINOPHIL # BLD: 0.1 K/UL (ref 0–0.6)
EOSINOPHIL NFR BLD: 1 %
GLUCOSE BLD-MCNC: 121 MG/DL (ref 70–99)
GLUCOSE BLD-MCNC: 129 MG/DL (ref 70–99)
GLUCOSE BLD-MCNC: 158 MG/DL (ref 70–99)
GLUCOSE BLD-MCNC: 96 MG/DL (ref 70–99)
HCT VFR BLD AUTO: 35 % (ref 40.5–52.5)
HGB BLD-MCNC: 11.2 G/DL (ref 13.5–17.5)
LYMPHOCYTES # BLD: 0.7 K/UL (ref 1–5.1)
LYMPHOCYTES NFR BLD: 7.4 %
MCH RBC QN AUTO: 28.4 PG (ref 26–34)
MCHC RBC AUTO-ENTMCNC: 32 G/DL (ref 31–36)
MCV RBC AUTO: 88.9 FL (ref 80–100)
MONOCYTES # BLD: 0.7 K/UL (ref 0–1.3)
MONOCYTES NFR BLD: 7.6 %
NEUTROPHILS # BLD: 7.5 K/UL (ref 1.7–7.7)
NEUTROPHILS NFR BLD: 83.8 %
PERFORMED ON: ABNORMAL
PERFORMED ON: NORMAL
PLATELET # BLD AUTO: 145 K/UL (ref 135–450)
PMV BLD AUTO: 9.5 FL (ref 5–10.5)
RBC # BLD AUTO: 3.94 M/UL (ref 4.2–5.9)
WBC # BLD AUTO: 9 K/UL (ref 4–11)

## 2025-03-02 PROCEDURE — 6370000000 HC RX 637 (ALT 250 FOR IP): Performed by: PHYSICIAN ASSISTANT

## 2025-03-02 PROCEDURE — 6360000002 HC RX W HCPCS: Performed by: NURSE PRACTITIONER

## 2025-03-02 PROCEDURE — 2500000003 HC RX 250 WO HCPCS: Performed by: PHYSICIAN ASSISTANT

## 2025-03-02 PROCEDURE — 85025 COMPLETE CBC W/AUTO DIFF WBC: CPT

## 2025-03-02 PROCEDURE — 1200000000 HC SEMI PRIVATE

## 2025-03-02 PROCEDURE — 36415 COLL VENOUS BLD VENIPUNCTURE: CPT

## 2025-03-02 RX ADMIN — ENOXAPARIN SODIUM 40 MG: 100 INJECTION SUBCUTANEOUS at 08:36

## 2025-03-02 RX ADMIN — OXYCODONE 10 MG: 5 TABLET ORAL at 21:18

## 2025-03-02 RX ADMIN — ASPIRIN 81 MG: 81 TABLET, CHEWABLE ORAL at 08:37

## 2025-03-02 RX ADMIN — TAMSULOSIN HYDROCHLORIDE 0.4 MG: 0.4 CAPSULE ORAL at 08:36

## 2025-03-02 RX ADMIN — SODIUM CHLORIDE, PRESERVATIVE FREE 10 ML: 5 INJECTION INTRAVENOUS at 21:19

## 2025-03-02 RX ADMIN — SODIUM CHLORIDE, PRESERVATIVE FREE 10 ML: 5 INJECTION INTRAVENOUS at 08:36

## 2025-03-02 RX ADMIN — METOPROLOL SUCCINATE 12.5 MG: 25 TABLET, EXTENDED RELEASE ORAL at 08:37

## 2025-03-02 RX ADMIN — ACETAMINOPHEN 650 MG: 325 TABLET, FILM COATED ORAL at 04:02

## 2025-03-02 RX ADMIN — ACETAMINOPHEN 650 MG: 325 TABLET, FILM COATED ORAL at 15:40

## 2025-03-02 RX ADMIN — POLYETHYLENE GLYCOL 3350 17 G: 17 POWDER, FOR SOLUTION ORAL at 08:36

## 2025-03-02 RX ADMIN — ACETAMINOPHEN 650 MG: 325 TABLET, FILM COATED ORAL at 08:37

## 2025-03-02 RX ADMIN — ACETAMINOPHEN 650 MG: 325 TABLET, FILM COATED ORAL at 21:18

## 2025-03-02 RX ADMIN — OXYCODONE 5 MG: 5 TABLET ORAL at 10:29

## 2025-03-02 ASSESSMENT — PAIN DESCRIPTION - FREQUENCY
FREQUENCY: INTERMITTENT
FREQUENCY: INTERMITTENT

## 2025-03-02 ASSESSMENT — PAIN DESCRIPTION - ORIENTATION
ORIENTATION: RIGHT
ORIENTATION: RIGHT

## 2025-03-02 ASSESSMENT — PAIN - FUNCTIONAL ASSESSMENT
PAIN_FUNCTIONAL_ASSESSMENT: PREVENTS OR INTERFERES WITH MANY ACTIVE NOT PASSIVE ACTIVITIES
PAIN_FUNCTIONAL_ASSESSMENT: PREVENTS OR INTERFERES WITH MANY ACTIVE NOT PASSIVE ACTIVITIES

## 2025-03-02 ASSESSMENT — PAIN DESCRIPTION - PAIN TYPE
TYPE: ACUTE PAIN
TYPE: ACUTE PAIN;SURGICAL PAIN

## 2025-03-02 ASSESSMENT — PAIN DESCRIPTION - LOCATION
LOCATION: HIP
LOCATION: HIP

## 2025-03-02 ASSESSMENT — PAIN DESCRIPTION - DESCRIPTORS
DESCRIPTORS: ACHING
DESCRIPTORS: PRESSURE;POUNDING

## 2025-03-02 ASSESSMENT — PAIN SCALES - GENERAL
PAINLEVEL_OUTOF10: 3
PAINLEVEL_OUTOF10: 0
PAINLEVEL_OUTOF10: 4
PAINLEVEL_OUTOF10: 0
PAINLEVEL_OUTOF10: 7
PAINLEVEL_OUTOF10: 5
PAINLEVEL_OUTOF10: 0

## 2025-03-02 ASSESSMENT — PAIN DESCRIPTION - ONSET
ONSET: PROGRESSIVE
ONSET: PROGRESSIVE

## 2025-03-02 ASSESSMENT — PAIN DESCRIPTION - DIRECTION: RADIATING_TOWARDS: NO

## 2025-03-02 NOTE — PLAN OF CARE
Problem: Pain  Goal: Verbalizes/displays adequate comfort level or baseline comfort level  Outcome: Progressing   Pt endorsing pain to R hip. Being treated with PRN pain medication, rest, and frequent repositioning with pillow support for comfort and pressure relief. Pt reports some relief from pain with above interventions.    Problem: Skin/Tissue Integrity  Goal: Skin integrity remains intact  Description: 1.  Monitor for areas of redness and/or skin breakdown  2.  Assess vascular access sites hourly  3.  Every 4-6 hours minimum:  Change oxygen saturation probe site  4.  Every 4-6 hours:  If on nasal continuous positive airway pressure, respiratory therapy assess nares and determine need for appliance change or resting period  Outcome: Progressing  Flowsheets (Taken 3/2/2025 0208)  Skin Integrity Remains Intact: Monitor for areas of redness and/or skin breakdown   Skin assessed this shift. Patient alternating positions to reduce pressure and prevent skin injury q2 hours and as needed.     Problem: Safety - Adult  Goal: Free from fall injury  Recent Flowsheet Documentation  Taken 3/2/2025 0208 by Lisset Mabry, RN  Free From Fall Injury: Instruct family/caregiver on patient safety  All fall precautions in place. Bed locked and in lowest position with alarm on. Overbed table and personal belonings within reach. Call light within reach and patient instructed to use call light for assistance. Non-skid socks on.

## 2025-03-02 NOTE — PLAN OF CARE
Problem: Safety - Adult  Goal: Free from fall injury  Note: Long , sleeping periods , arouses to verbal stim , up to chair , 2 person steady , toes warm natural , brisk cap refill , dressing cdi , thigh soft , positive dorsi flex , meeting pain goal with  tylenol . Pure wic in place , 800 ml ,

## 2025-03-02 NOTE — PROGRESS NOTES
V2.0  [facility] [place] Note      Name:  Harjinder Mcgee Jr /Age/Sex: 1935  (89 y.o. male)   MRN & CSN:  [id number] & [id number] Encounter Date/Time: 3/2/2025 12:24 PM EST    Location:  5513/5513-01 PCP: Jorge Will MD       Hospital Day: 4    Assessment and Plan:   Harjinder Mcgee Jr is a 89 y.o. male with pmh of  who presents with Closed right hip fracture, initial encounter ([facility])      Plan:      Harjinder Mcgee Jr is a 89 y.o. male with PMH significant for Parkinson's, dementia, HTN, HLD, CAD, s/p CABG (), ICMP, EF 15-20% (), on GDMT, echo (2023) EF 30-35%, s/p single chamber ICD (23, Dr. Arenas), NSVT noted on device who presents with a mechanical fall and Closed right hip fracture, initial encounter ([facility])     R femoral neck # post mechanical fall- POA   -S/p open femoral neck fracture with hemiarthroplasty postop day 1.  Patient remains slightly sleepy PT OT recommended ARU.  Started on Lovenox 40 daily; follow-up in 2 to 3 weeks will discuss with case management.  No active abduction walker use for 6 weeks; PMNR consult placed for evaluation.  Patient is pretty active prior to arrival to the hospital he is driving and working as well     2. ICMP/CAD/HFrEF- chronic   Resume BB  Blood pressure soft.  Continue to hold Entresto, Aldactone, Lasix  Overall appears euvolemic.     3. BPH- resume flomax    Diet ADULT DIET; Regular   DVT Prophylaxis [] Lovenox, []  Heparin, [] SCDs, [] Ambulation,  [] Eliquis, [] Xarelto  [] Coumadin   Code Status Full Code   Disposition From:   Expected Disposition:   Estimated Date of Discharge:   Patient requires continued admission due to    Surrogate Decision Maker/ POA      Personally reviewed Lab Studies and Imaging       Subjective:     Chief Complaint: Fall (Pt presents to the Ed after a fall at home. Pt states he was trying to walk don the stairs on his front porch and fell about 3ft and is now complaining of right hip pain. Pt denies hitting head  and loss of consciousness.)       Harjinder Mcgee Jr is a 89 y.o. male who presents with mechanical fall and right femoral neck fracture.  Send seen and examined in the morning.  He is alert and oriented.  Pain controlled.         Review of Systems:    Review of Systems    Negative for mentioned above    Objective:     Intake/Output Summary (Last 24 hours) at 3/2/2025 1312  Last data filed at 3/2/2025 0601  Gross per 24 hour   Intake 220 ml   Output 1150 ml   Net -930 ml        Vitals:   Vitals:    03/02/25 1029   BP:    Pulse:    Resp: 16   Temp:    SpO2:        Physical Exam:     General: NAD  Eyes: EOMI  ENT: neck supple  Cardiovascular: Regular rate.  Respiratory: Clear to auscultation  Gastrointestinal: Soft, non tender  Genitourinary: no suprapubic tenderness  Musculoskeletal: Deformed, shortened and externally rotated right lower extremity, no edema  Skin: warm, dry  Neuro: Alert.  Psych: Mood appropriate.     Medications:   Medications:    enoxaparin  40 mg SubCUTAneous Daily    aspirin  81 mg Oral Daily    insulin lispro  0.08 Units/kg SubCUTAneous TID WC    insulin lispro  0-8 Units SubCUTAneous 4x Daily AC & HS    sodium chloride flush  5-40 mL IntraVENous 2 times per day    acetaminophen  650 mg Oral Q6H    polyethylene glycol  17 g Oral Daily    sodium chloride flush  5-40 mL IntraVENous 2 times per day    metoprolol succinate  12.5 mg Oral Daily    tamsulosin  0.4 mg Oral Daily      Infusions:    dextrose      sodium chloride      sodium chloride       PRN Meds: glucose, 4 tablet, PRN  dextrose bolus, 125 mL, PRN   Or  dextrose bolus, 250 mL, PRN  glucagon (rDNA), 1 mg, PRN  dextrose, , Continuous PRN  sodium chloride flush, 5-40 mL, PRN  sodium chloride, , PRN  oxyCODONE, 5 mg, Q4H PRN   Or  oxyCODONE, 10 mg, Q4H PRN  morphine, 2 mg, Q2H PRN   Or  morphine, 4 mg, Q2H PRN  senna, 5 mL, BID PRN  sodium chloride flush, 5-40 mL, PRN  sodium chloride, , PRN  potassium chloride, 40 mEq, PRN   Or  potassium

## 2025-03-02 NOTE — PROGRESS NOTES
VSS on RA. Pt A+O to self, oriented/disoriented at times. PT voiding via external male catheter. Pt did not ambulate this shift, gets up x2 stedy. Pt endorses pain to R hip, pain controlled per MAR. Pt tolerating PO diet/fluids. All fall precautions in place, call light within reach.

## 2025-03-02 NOTE — PLAN OF CARE
Problem: Safety - Adult  Goal: Free from fall injury  Flowsheets (Taken 3/2/2025 0208 by Lisset Mabry, RN)  Free From Fall Injury: Instruct family/caregiver on patient safety  Note: Alert sleeps at times , eats 100 percent of , one meal then skips next , continue to encourage intake  up in chair with 2 person max assist , and dylan steady , able to stand once upright , , toes warm ,natural , positive dorsi flex , brisk cap refill , tolerating chair well , air cushion , in chair

## 2025-03-03 LAB
BASOPHILS # BLD: 0 K/UL (ref 0–0.2)
BASOPHILS NFR BLD: 0.3 %
DEPRECATED RDW RBC AUTO: 13.5 % (ref 12.4–15.4)
EOSINOPHIL # BLD: 0.1 K/UL (ref 0–0.6)
EOSINOPHIL NFR BLD: 1.3 %
GLUCOSE BLD-MCNC: 106 MG/DL (ref 70–99)
GLUCOSE BLD-MCNC: 121 MG/DL (ref 70–99)
GLUCOSE BLD-MCNC: 151 MG/DL (ref 70–99)
GLUCOSE BLD-MCNC: 152 MG/DL (ref 70–99)
HCT VFR BLD AUTO: 35.2 % (ref 40.5–52.5)
HGB BLD-MCNC: 11.6 G/DL (ref 13.5–17.5)
LYMPHOCYTES # BLD: 1.5 K/UL (ref 1–5.1)
LYMPHOCYTES NFR BLD: 14.5 %
MCH RBC QN AUTO: 28.6 PG (ref 26–34)
MCHC RBC AUTO-ENTMCNC: 33 G/DL (ref 31–36)
MCV RBC AUTO: 86.7 FL (ref 80–100)
MONOCYTES # BLD: 1.3 K/UL (ref 0–1.3)
MONOCYTES NFR BLD: 12.5 %
NEUTROPHILS # BLD: 7.3 K/UL (ref 1.7–7.7)
NEUTROPHILS NFR BLD: 71.4 %
PERFORMED ON: ABNORMAL
PLATELET # BLD AUTO: 166 K/UL (ref 135–450)
PMV BLD AUTO: 9.9 FL (ref 5–10.5)
RBC # BLD AUTO: 4.06 M/UL (ref 4.2–5.9)
WBC # BLD AUTO: 10.3 K/UL (ref 4–11)

## 2025-03-03 PROCEDURE — 97110 THERAPEUTIC EXERCISES: CPT

## 2025-03-03 PROCEDURE — 2500000003 HC RX 250 WO HCPCS: Performed by: PHYSICIAN ASSISTANT

## 2025-03-03 PROCEDURE — 6370000000 HC RX 637 (ALT 250 FOR IP): Performed by: PHYSICIAN ASSISTANT

## 2025-03-03 PROCEDURE — 97530 THERAPEUTIC ACTIVITIES: CPT

## 2025-03-03 PROCEDURE — 99024 POSTOP FOLLOW-UP VISIT: CPT | Performed by: PHYSICIAN ASSISTANT

## 2025-03-03 PROCEDURE — 97535 SELF CARE MNGMENT TRAINING: CPT

## 2025-03-03 PROCEDURE — 36415 COLL VENOUS BLD VENIPUNCTURE: CPT

## 2025-03-03 PROCEDURE — 85025 COMPLETE CBC W/AUTO DIFF WBC: CPT

## 2025-03-03 PROCEDURE — 1200000000 HC SEMI PRIVATE

## 2025-03-03 PROCEDURE — 6360000002 HC RX W HCPCS: Performed by: NURSE PRACTITIONER

## 2025-03-03 RX ORDER — OXYCODONE HYDROCHLORIDE 5 MG/1
5 TABLET ORAL EVERY 8 HOURS PRN
Qty: 9 TABLET | Refills: 0 | Status: SHIPPED | OUTPATIENT
Start: 2025-03-03 | End: 2025-03-06

## 2025-03-03 RX ORDER — POLYETHYLENE GLYCOL 3350 17 G/17G
17 POWDER, FOR SOLUTION ORAL DAILY PRN
Qty: 30 PACKET | Refills: 0 | Status: SHIPPED | OUTPATIENT
Start: 2025-03-03 | End: 2025-04-02

## 2025-03-03 RX ORDER — ENOXAPARIN SODIUM 100 MG/ML
40 INJECTION SUBCUTANEOUS DAILY
Qty: 30 EACH | Refills: 0 | Status: SHIPPED | OUTPATIENT
Start: 2025-03-04

## 2025-03-03 RX ORDER — SENNOSIDES 8.8 MG/5ML
5 LIQUID ORAL 2 TIMES DAILY PRN
Qty: 105 ML | Refills: 0 | Status: SHIPPED | OUTPATIENT
Start: 2025-03-03

## 2025-03-03 RX ORDER — METOPROLOL SUCCINATE 25 MG/1
12.5 TABLET, EXTENDED RELEASE ORAL DAILY
Qty: 30 TABLET | Refills: 3 | Status: SHIPPED | OUTPATIENT
Start: 2025-03-04

## 2025-03-03 RX ADMIN — ACETAMINOPHEN 650 MG: 325 TABLET, FILM COATED ORAL at 11:18

## 2025-03-03 RX ADMIN — TAMSULOSIN HYDROCHLORIDE 0.4 MG: 0.4 CAPSULE ORAL at 10:58

## 2025-03-03 RX ADMIN — ACETAMINOPHEN 650 MG: 325 TABLET, FILM COATED ORAL at 22:20

## 2025-03-03 RX ADMIN — SODIUM CHLORIDE, PRESERVATIVE FREE 10 ML: 5 INJECTION INTRAVENOUS at 22:00

## 2025-03-03 RX ADMIN — OXYCODONE 5 MG: 5 TABLET ORAL at 03:15

## 2025-03-03 RX ADMIN — ACETAMINOPHEN 650 MG: 325 TABLET, FILM COATED ORAL at 03:14

## 2025-03-03 RX ADMIN — INSULIN LISPRO 6 UNITS: 100 INJECTION, SOLUTION INTRAVENOUS; SUBCUTANEOUS at 18:50

## 2025-03-03 RX ADMIN — POLYETHYLENE GLYCOL 3350 17 G: 17 POWDER, FOR SOLUTION ORAL at 11:17

## 2025-03-03 RX ADMIN — SODIUM CHLORIDE, PRESERVATIVE FREE 10 ML: 5 INJECTION INTRAVENOUS at 11:23

## 2025-03-03 RX ADMIN — ASPIRIN 81 MG: 81 TABLET, CHEWABLE ORAL at 10:58

## 2025-03-03 RX ADMIN — ENOXAPARIN SODIUM 40 MG: 100 INJECTION SUBCUTANEOUS at 10:59

## 2025-03-03 RX ADMIN — SODIUM CHLORIDE, PRESERVATIVE FREE 10 ML: 5 INJECTION INTRAVENOUS at 22:01

## 2025-03-03 RX ADMIN — SODIUM CHLORIDE, PRESERVATIVE FREE 10 ML: 5 INJECTION INTRAVENOUS at 11:22

## 2025-03-03 ASSESSMENT — PAIN DESCRIPTION - DESCRIPTORS: DESCRIPTORS: PRESSURE;POUNDING

## 2025-03-03 ASSESSMENT — PAIN SCALES - GENERAL
PAINLEVEL_OUTOF10: 5
PAINLEVEL_OUTOF10: 0
PAINLEVEL_OUTOF10: 0

## 2025-03-03 ASSESSMENT — PAIN DESCRIPTION - ONSET: ONSET: AWAKENED FROM SLEEP

## 2025-03-03 ASSESSMENT — PAIN DESCRIPTION - PAIN TYPE: TYPE: SURGICAL PAIN

## 2025-03-03 ASSESSMENT — PAIN DESCRIPTION - LOCATION: LOCATION: HIP

## 2025-03-03 ASSESSMENT — PAIN DESCRIPTION - DIRECTION: RADIATING_TOWARDS: N0

## 2025-03-03 ASSESSMENT — PAIN - FUNCTIONAL ASSESSMENT: PAIN_FUNCTIONAL_ASSESSMENT: PREVENTS OR INTERFERES SOME ACTIVE ACTIVITIES AND ADLS

## 2025-03-03 ASSESSMENT — PAIN DESCRIPTION - ORIENTATION: ORIENTATION: RIGHT

## 2025-03-03 ASSESSMENT — PAIN DESCRIPTION - FREQUENCY: FREQUENCY: INTERMITTENT

## 2025-03-03 NOTE — PROGRESS NOTES
Occupational Therapy  Facility/Department: [facility] 5T ORTHO/NEURO  Daily Treatment Note  NAME: Harjinder Mcgee Jr  : 1935  MRN: [id number]    Date of Service: 3/3/2025    Discharge Recommendations:  IP Rehab  OT Equipment Recommendations  Equipment Needed: No (defer recommendations to discharge facility)      Patient Diagnosis(es): The primary encounter diagnosis was Closed displaced fracture of right femoral neck (HCC). A diagnosis of Fall, initial encounter was also pertinent to this visit.  Past Medical History:  has a past medical history of Arthritis, Balance disorder, CAD (coronary artery disease), Chest pain, Dental disease, Dizziness, Dyslipidemia, Fatigue, Hard of hearing, History of prostate cancer, Hypertension, Hyperthyroidism, Impaired memory, SOB (shortness of breath), and Vitamin D deficiency.  Past Surgical History:  has a past surgical history that includes Cardiac surgery and hip surgery (Right, 2025).    Assessment       Assessment: Pt limited by pain but still willing to participate in therapy. Does continued to require assist of 2 for transfers with STEDY and was able to complete sit<>stand transfer with RW, but when trialing to take one step, LLE buckling. Pt completes seated level ADLs well, but will require assistance for any standing level functional activities. Recommend ongoing OT to work on ADLs, funct mob and transfers. Cont with POC      Activity Tolerance: Patient tolerated treatment well  Discharge Recommendations: IP Rehab  Equipment Needed: No (defer recommendations to discharge facility)     Plan  Occupational Therapy Plan  Times Per Week: 5-7    Restrictions  Position Activity Restriction  Other Position/Activity Restrictions: WBAT R LE, up in chair, ambulate; Lateral: Precautions for lateral approach:  no active abduction, no hip extension past midline (use a step to gait pattern with surgical lower extremity leading), no external  to his wife    AM-PAC - ADL  AM-PAC Daily Activity - Inpatient   How much help is needed for putting on and taking off regular lower body clothing?: Total  How much help is needed for bathing (which includes washing, rinsing, drying)?: A Lot  How much help is needed for toileting (which includes using toilet, bedpan, or urinal)?: Total  How much help is needed for putting on and taking off regular upper body clothing?: A Little  How much help is needed for taking care of personal grooming?: A Little  How much help for eating meals?: None  [facility] Daily [facility] Raw Score: 14  AM-PAC Inpatient ADL T-Scale Score : 33.39  ADL Inpatient CMS 0-100% Score: 59.67  ADL Inpatient CMS G-Code Modifier : CK    Therapy Time   Individual Concurrent Group Co-treatment   Time In       1330   Time Out       1410   Minutes       40   Timed Code Treatment Minutes: 40 Minutes       [x]co-treatment indicated; patient seen for co-treatment due to: patient safety and need for the assistance of 2 skilled therapists.    PT addressing: [] Sitting balance/LE WB, [] Standing balance/LE WB, []Transfer training, [] BLE strength/endurance with functional tasks,  [] Other:  OT addressing: [x]ADL's, [] Pericare,  []clothing management, [x] BU E strength/endurance with functional tasks,  [] UE WB [] Other:      ODALYS Masters

## 2025-03-03 NOTE — DISCHARGE INSTRUCTIONS
Total Hip &Bipolar Replacement  Discharge Instructions    To prevent Clot formation, you have been placed on the following medication:  Lovenox 40mg subcut daily for 30 days  Surgical Site Care:  Dressing change every 5-7 days with mepilex dressing.  Can be changed at home until incision healed  If you have staples or sutures, these will be removed on post-operative day 10-12 and steri-strips applied  If you have glue, this will be removed in the office or gradually wear off as your incision heals  Showering is permitted if waterproof mepilex dressing is applied or when staples are removed and all areas of incision are healed.  Thigh high compression stockings are to be worn for 30 days post op. Put them on in the morning and take them off at night  Physical Therapy:  Weight Bearing Status:     Weight bearing as tolerated  3 times per week via Home Health or outpatient therapy depending on your surgeons preference  Precautions  Per Physical Therapy handout  Pain Medications  You were given oxycodone (Oxycontin, Oxyir)  Wean off pain medications as you deem appropriate as long as pain is under control  Be sure to drink plenty of fluids (recommend water) while taking narcotic pain medications to prevent constipation  You may take an over the counter laxative or stool softener as needed to prevent/treat constipation as well, we recommend miralax/glycolax.  We recommend that you consider taking these medications the entire time you are taking pain medication.  Cold packs/Ice packs/Machine  May be used as much as necessary to reduce swelling/inflammation/soreness  Be sure to have a barrier (cloth, clothing, towel) between your skin/incision and the ice pack to prevent frostbite  Contact [facility] office if  Increased redness, swelling, drainage of any kind, and/or pain to surgery site.  As well as new onset fevers and or chills.  These could signify an infection.  Calf or thigh tenderness to touch as well as

## 2025-03-03 NOTE — CARE COORDINATION
CM spoke with patient's son Thomas over the phone.  They would like referrals to SNF: 1) [facility], 2) [facility], 3) [facility], 4) [facility].  Patient is POD 3 rt hip repair.  Will need a pre-cert once accepted.    2:05 PM  1) [facility] -no bed   2) [facility]  -no bed    3) [facility] -no bed  4) [facility]- accepted, bed available.     Bessie Chin, RN, BSN,   5T Ortho/Neuro   [phone]

## 2025-03-03 NOTE — PLAN OF CARE
Problem: Safety - Adult  Goal: Free from fall injury  3/2/2025 1901 by Zachery Aceves, RN  Note: Alert forgetful , stases I know I'm confused , want to go home , reoriented able to javier on simple logical , conversation , talked with son and other family members , stases he is not in pain , little sore but doesn't want pain pills , pt is receiving tylenol scheduled , camera on for safety  Son will be in room around 730 pm , pt aware ,   3/2/2025 1519 by Zachery Aceves, RN  Flowsheets (Taken 3/2/2025 0208 by Lisset Mabry, RN)  Free From Fall Injury: Instruct family/caregiver on patient safety  Note: Alert sleeps at times , eats 100 percent of , one meal then skips next , continue to encourage intake  up in chair with 2 person max assist , and dylan steady , able to stand once upright , , toes warm ,natural , positive dorsi flex , brisk cap refill , tolerating chair well , air cushion , in chair

## 2025-03-03 NOTE — PROGRESS NOTES
Update 3/5/2025: precert denied- P2P denied- defer to SNF level of care.     The [facility] - Acute Rehab Unit   After review, this patient is felt to be:       [x]  Dr. Eckert states this patient is appropriate for rehab.     []  Not appropriate for Acute Inpatient Rehab    []  Referral received and ARU reviewing patient      Precert initiated 3/3/2025 for ARU admission. Pt in agreement per  and CL's conversation with pt this AM. Ref#: [id number]   Will notify CM/SW with further updates. Thank you for the referral.      Yvonne PADGETT OTR/L  Clinical Liaison- [facility]   (P): [phone]  (F): [phone]

## 2025-03-03 NOTE — PROGRESS NOTES
Patient is alert and oriented x4 ( intermittent confusion). VSS on room air. Medications given per MAR, no side effects noted. PRN pain medication manage per MAR. Voiding well via External catheter this shift.      Patient is currently resting in bed with bed alarm on for safety. Call light within reach and all fall precautions in place. Plan of care continues.

## 2025-03-03 NOTE — CONSULTS
Consult Note  Physical Medicine and Rehabilitation    Patient: Harjinder Mcgee Jr  [id number]  Date: 3/3/2025      Chief Complaint: mechanical fall    History of Present Illness/Hospital Course:  Harjinder Mcgee Jr is a 89 y.o. male with PMH significant for Parkinson's, dementia, HTN, HLD, CAD, s/p CABG (2009), ICMP, EF 15-20% (2022), on GDMT, echo (2/2023) EF 30-35%, s/p single chamber ICD (4/21/23, Dr. Arenas), NSVT noted on device who presents with a mechanical fall and Closed right hip fracture. Patient reports that he was at home and descending the stairs and lost his footing and fell. Patient is s/p open treatment femoral neck fracture with hemiarthroplasty on 2/28.    POD#3. Patient seen resting comfortably in bed. No acute events overnight. No complaints this morning.    Prior Level of Function:  Patient reports he's independent for mobility, ADLs, and IADLs    Current Level of Function:  Below baseline of function    Pertinent Social History:  Support: Two children who visit daily    Past Medical History:   Diagnosis Date    Arthritis     Balance disorder     CAD (coronary artery disease)     Chest pain     Dental disease     Dizziness     Dyslipidemia     Fatigue     Hard of hearing     History of prostate cancer     Hypertension     Hyperthyroidism     Impaired memory     SOB (shortness of breath)     Vitamin D deficiency        Past Surgical History:   Procedure Laterality Date    CARDIAC SURGERY      HIP SURGERY Right 2/28/2025    RIGHT HIP HEMIARTHROPLASTY performed by Claude Menchaca MD at [facility] OR       Family History   Problem Relation Age of Onset    Heart Disease Mother     Heart Disease Father        Social History     Socioeconomic History    Marital status:      Spouse name: None    Number of children: None    Years of education: None    Highest education level: None   Tobacco Use    Smoking status: Former     Current packs/day: 0.00     Types: Cigarettes     Quit date: 1/1/1975     Years since  There is no distension.      Tenderness: There is no abdominal tenderness.   Musculoskeletal:      Right lower leg: No edema.      Left lower leg: No edema.      Comments: Reduced range of motion in upper extremities and reduced motor ability in lower extremities bilaterally.    Skin:     General: Skin is warm and dry.   Neurological:      General: No focal deficit present.      Mental Status: He is alert and oriented to person, place, and time.   Psychiatric:         Mood and Affect: Mood normal.         Behavior: Behavior normal.        Lab Results   Component Value Date    WBC 10.3 03/03/2025    HGB 11.6 (L) 03/03/2025    HCT 35.2 (L) 03/03/2025    MCV 86.7 03/03/2025     03/03/2025     Lab Results   Component Value Date    INR 1.02 04/21/2023    PROTIME 13.4 04/21/2023     Lab Results   Component Value Date    CREATININE 1.1 03/01/2025    BUN 17 03/01/2025     03/01/2025    K 4.3 03/01/2025     03/01/2025    CO2 25 03/01/2025     Lab Results   Component Value Date    ALT 9 (L) 03/28/2024    AST 15 03/28/2024    ALKPHOS 68 03/28/2024    BILITOT 0.4 03/28/2024       Labs and Imaging: available via EMR.     XR PELVIS (1-2 VIEWS)   Final Result   Status post right hip arthroplasty. No superimposed acute osseous   abnormality.      Electronically signed by Mack Garland      XR HIP 2-3 VW W PELVIS RIGHT   Final Result      As above.      Electronically signed by Fitz Monroe DO      XR CHEST 1 VIEW   Final Result      1. No evidence for acute cardiopulmonary disease         Electronically signed by Bret Dean MD            Assessment:    Impairments- Decreased functional mobility, Decreased ADLs    Recommendations:  Start precert for ARU    Patient with new functional deficits and ongoing medical complexity. Will continue to evaluate if patient demonstrates ability to tolerate 3 hours therapy/day. If so, is a good candidate for acute inpatient rehab when medically appropriate.    Thank you

## 2025-03-03 NOTE — PROGRESS NOTES
Department of Orthopedic Surgery  Physician Assistant   Progress Note    Subjective:     Post-Operative Day: 4 Status Post right Hip Hemiarthroplasty  Systemic or Specific Complaints:Pain Control    Objective:     Patient Vitals for the past 24 hrs:   BP Temp Temp src Pulse Resp SpO2 Weight   03/03/25 0944 (!) 108/56 98.3 °F (36.8 °C) Oral 76 16 97 % --   03/03/25 0600 -- -- -- -- -- -- 74 kg (163 lb 2.3 oz)   03/03/25 0310 124/68 98.5 °F (36.9 °C) Oral 88 16 94 % --   03/03/25 0006 126/67 98.9 °F (37.2 °C) Oral 91 16 91 % --   03/02/25 1920 137/75 98 °F (36.7 °C) Oral 86 14 98 % --   03/02/25 1615 (!) 144/78 98.5 °F (36.9 °C) Oral 89 16 100 % --       General: alert, appears stated age, and cooperative   Wound: Wound clean and dry no evidence of infection., No Erythema, No Drainage, Wound Intact, and Positive for Edema   Motion: Painful range of Motion   DVT Exam: No evidence of DVT seen on physical exam.       NVI in lower extremity. Thigh swollen but soft. Moving foot and ankle.      Data Review  CBC:   Lab Results   Component Value Date/Time    WBC 10.3 03/03/2025 05:35 AM    RBC 4.06 03/03/2025 05:35 AM    HGB 11.6 03/03/2025 05:35 AM    HCT 35.2 03/03/2025 05:35 AM     03/03/2025 05:35 AM       Assessment:     Status Post right Hip Hemiarthroplasty. Doing well postoperatively.     Plan:      1: Continues current post-op course : WBAT.  Ice for swelling.  SNF when able  2:  Continue Deep venous thrombosis prophylaxis - lovenox 40mg daily for 30 days.  3:  Continue physical therapy  4:  Continue Pain Control

## 2025-03-03 NOTE — DISCHARGE SUMMARY
V2.0  Discharge Summary    Name:  Harjinder Mcgee Jr /Age/Sex: 1935 (89 y.o. male)   Admit Date: 2025  Discharge Date: 3/3/25    MRN & CSN:  [id number] & [id number] Encounter Date and Time 3/3/25 12:10 PM EST    Attending:  Dionne Grant MD Discharging Provider: NICK Gautam [facility] [place] Course:     Brief HPI: Harjinder Mcgee Jr is a 89 y.o. male who presented with PMH significant for Parkinson's, dementia, HTN, HLD, CAD, s/p CABG (), ICMP, EF 15-20% (), on GDMT, echo (2023) EF 30-35%, s/p single chamber ICD (23, Dr. Arenas), NSVT noted on device who presents with a mechanical fall and Closed right hip fracture, initial encounter ([facility])     Brief Problem Based Course:     R femoral neck # post mechanical fall- POA   -S/p open femoral neck fracture with hemiarthroplasty postop day 1.  Patient remains slightly sleepy PT OT recommended ARU.  Started on Lovenox 40 daily; follow-up in 2 to 3 weeks will discuss with case management.  No active abduction walker use for 6 weeks; PMNR consult placed for evaluation.  Patient is pretty active prior to arrival to the hospital he is driving and working as well     2. ICMP/CAD/HFrEF- chronic   Resume BB  Blood pressure soft.  Continue to hold Entresto, Aldactone, Lasix  Overall appears euvolemic.     3. BPH- resume flomax      The patient expressed appropriate understanding of, and agreement with the discharge recommendations, medications, and plan.     Consults this admission:  IP CONSULT TO ORTHOPEDIC SURGERY  IP CONSULT TO HOSPITALIST  IP CONSULT TO PHYSICAL MEDICINE REHAB  IP CONSULT TO SOCIAL WORK    Discharge Diagnosis:   Closed right hip fracture, initial encounter ([facility])      Discharge Instruction:   Follow up appointments:   Primary care physician: Jorge Will MD within 1 week  Diet: regular diet   Activity: activity as tolerated  Disposition: Discharged to:   [x]Home, []HHC, []SNF, []Acute Rehab, []Hospice   Condition on  discharge: Stable  Labs and Tests to be Followed up as an outpatient by PCP or Specialist:     Discharge Medications:        Medication List        START taking these medications      enoxaparin 40 MG/0.4ML  Commonly known as: LOVENOX  Inject 0.4 mLs into the skin daily  Start taking on: March 4, 2025     metoprolol succinate 25 MG extended release tablet  Commonly known as: TOPROL XL  Take 0.5 tablets by mouth daily  Start taking on: March 4, 2025     oxyCODONE 5 MG immediate release tablet  Commonly known as: ROXICODONE  Take 1 tablet by mouth every 8 hours as needed for Pain for up to 3 days. Max Daily Amount: 15 mg     polyethylene glycol 17 g packet  Commonly known as: GLYCOLAX  Take 1 packet by mouth daily as needed for Constipation     senna 8.8 MG/5ML Syrp syrup  Commonly known as: SENOKOT  Take 5 mLs by mouth 2 times daily as needed (constipation)            CONTINUE taking these medications      aspirin 81 MG chewable tablet  Take 1 tablet by mouth daily     atorvastatin 40 MG tablet  Commonly known as: LIPITOR  Take 1 tablet by mouth daily for cholesterol.     Entresto 24-26 MG per tablet  Generic drug: sacubitril-valsartan  TAKE 1/2 TABLET BY MOUTH TWICE DAILY     furosemide 40 MG tablet  Commonly known as: LASIX  TAKE 1 TABLET BY MOUTH DAILY AS NEEDED FOR LOWER EXTREMITY SWELLING     oxyBUTYnin 5 MG extended release tablet  Commonly known as: DITROPAN-XL  TAKE 1 TABLET BY MOUTH DAILY     spironolactone 25 MG tablet  Commonly known as: ALDACTONE  TAKE ONE-HALF TABLET BY MOUTH DAILY     tamsulosin 0.4 MG capsule  Commonly known as: FLOMAX  TAKE 1 CAPSULE BY MOUTH DAILY               Where to Get Your Medications        [id number] - [place], [place] - [address] - P [phone] - F [phone] [address] [facility] [address]      Phone: [phone]   enoxaparin 40 MG/0.4ML  metoprolol succinate 25 MG extended release

## 2025-03-03 NOTE — DISCHARGE INSTR - COC
Continuity of Care Form    Patient Name: Harjinder Mcgee Jr   :  1935  MRN:  [id number]    Admit date:  2025  Discharge date:  ***    Code Status Order: Full Code   Advance Directives:   Advance Care Flowsheet Documentation        Date/Time Healthcare Directive Type of Healthcare Directive Copy in Chart Healthcare Agent Appointed Healthcare Agent's Name Healthcare Agent's Phone Number    [phone] No, patient does not have an advance directive for healthcare treatment  --  --  --  --  --                     Admitting Physician:  Rashi Wilder MD  PCP: Jorge Will MD    Discharging Nurse: Anthony CHACKO  Discharging Hospital Unit/Room#: 5513/5513-01  Discharging Unit Phone Number: [phone]    Emergency Contact:   Extended Emergency Contact Information  Primary Emergency Contact: Tammie Mcgee  Mobile Phone: [phone]  Relation: Child  Secondary Emergency Contact: Mattie Mcgee  Address: [address] [place] [address]  Home Phone: [phone]  Relation: Spouse    Past Surgical History:  Past Surgical History:   Procedure Laterality Date    CARDIAC SURGERY      HIP SURGERY Right 2025    RIGHT HIP HEMIARTHROPLASTY performed by Claude Menchaca MD at [facility] OR       Immunization History:   Immunization History   Administered Date(s) Administered    COVID-19, PFIZER PURPLE top, DILUTE for use, (age 12 y+), 30mcg/0.3mL 2021, 2021, 2021    Influenza 2010, 10/17/2012    Influenza Virus Vaccine 12/15/2011, 10/17/2012, 2013, 2014, 2015    Influenza, FLUAD, (age 65 y+), IM, Quadv, 0.5mL 2021, 10/05/2022, 10/20/2023    Influenza, FLUAD, (age 65 y+), IM, Trivalent PF, 0.5mL 2020, 2024    Influenza, FLUZONE High Dose, (age 65 y+), IM, Trivalent PF, 0.5mL 12/15/2011, 2013, 2014, 2015, 2016, 2017    Pneumococcal, PCV-13, PREVNAR 13, (age 6w+), IM, 0.5mL 05/15/2017    Pneumococcal,  Discharge:   - WBAT with assist device  - DVT ppx: lovenox 40 mg once daily  - ambulate postop with PT  - resume home meds, diet  -  f/u scheduled with Claude Menchaca- orthopedic  in 2-3 weeks    Physician Certification: I certify the above information and transfer of Harjinder Mcgee   is necessary for the continuing treatment of the diagnosis listed and that he requires Skilled Nursing Facility for less 30 days.     Update Admission H&P: No change in H&P    PHYSICIAN SIGNATURE:  Electronically signed by NICK Gautam CNP on 3/3/25 at 12:06 PM EST

## 2025-03-03 NOTE — PROGRESS NOTES
arrival. Agreeable to treatment  Pain: +pain of the RLE; no rating provided. Repositioned for comfort.    Objective  Vitals     Bed Mobility Training  Bed Mobility Training: Yes  Supine to Sit: 2 Person assistance;Partial/Moderate assistance (with HOB elevated and bed railing. +cues for sequencing. inc'd time to complete)      Balance  Sitting: With support (CGA at EOB)  Standing: With support (2 person assist with katlyn stedy or RW)   Pt demo's a L lateral lean in standing with inc'd trunk flexion. Cues to correct posture and transition to midline. Pt participated in wt shifting while in the standing position; reluctant to shift wt towards the R side however improvement as time progresses. No buckling noted with wt shifting in the static standing position.       Transfer Training  Transfer Training: Yes  Sit to Stand: Partial/Moderate assistance;2 Person assistance (up to RW (x2 repetitions), up to katlyn stedy (x1 repetition). cues for sequencing with emphasis on hand placement)  Stand to Sit: Partial/Moderate assistance;2 Person assistance (with RW (x2 repetitions), with katlyn stedy (x1 repetition). Cues for initiation and hand placement)  Bed to Chair: Dependent/Total (with katlyn stedy)      Gait  Gait Training: Yes (attempted gait training with 2 person assist (modA) and RW. Pt was able to initiate one step forward with the RLE however when attempting to take a step with the LLE pt unable to maintain SLS on the RLE and noted to have +buckling requiring MaxA)     PT Exercises  Exercise Treatment: Pt participated in seated BLE therex. Exercises included: hip flexion, LAQs, APs. x 10 repetitiions of each. cues for completion and inc'd time 2/2 weakness     Safety Devices  Type of Devices: Chair alarm in place;Call light within reach;Left in chair;Telesitter in use  Restraints  Restraints Initially in Place: No         Goals  Short Term Goals--ongoing   Time Frame for Short Term Goals: discharge  Short Term Goal 1: Pt

## 2025-03-03 NOTE — PLAN OF CARE
Problem: Safety - Adult  Goal: Free from fall injury  3/2/2025 2248 by Suerndra Coppola RN  Outcome: Progressing     Problem: Pain  Goal: Verbalizes/displays adequate comfort level or baseline comfort level  Outcome: Progressing

## 2025-03-04 LAB
GLUCOSE BLD-MCNC: 172 MG/DL (ref 70–99)
GLUCOSE BLD-MCNC: 416 MG/DL (ref 70–99)
GLUCOSE BLD-MCNC: 478 MG/DL (ref 70–99)
GLUCOSE BLD-MCNC: 76 MG/DL (ref 70–99)
GLUCOSE BLD-MCNC: 99 MG/DL (ref 70–99)
PERFORMED ON: ABNORMAL
PERFORMED ON: NORMAL
PERFORMED ON: NORMAL

## 2025-03-04 PROCEDURE — 2500000003 HC RX 250 WO HCPCS: Performed by: PHYSICIAN ASSISTANT

## 2025-03-04 PROCEDURE — 97530 THERAPEUTIC ACTIVITIES: CPT

## 2025-03-04 PROCEDURE — 6370000000 HC RX 637 (ALT 250 FOR IP): Performed by: PHYSICIAN ASSISTANT

## 2025-03-04 PROCEDURE — 1200000000 HC SEMI PRIVATE

## 2025-03-04 PROCEDURE — 97535 SELF CARE MNGMENT TRAINING: CPT

## 2025-03-04 PROCEDURE — 6360000002 HC RX W HCPCS: Performed by: NURSE PRACTITIONER

## 2025-03-04 RX ADMIN — INSULIN LISPRO 8 UNITS: 100 INJECTION, SOLUTION INTRAVENOUS; SUBCUTANEOUS at 11:46

## 2025-03-04 RX ADMIN — SODIUM CHLORIDE, PRESERVATIVE FREE 10 ML: 5 INJECTION INTRAVENOUS at 09:11

## 2025-03-04 RX ADMIN — ACETAMINOPHEN 650 MG: 325 TABLET, FILM COATED ORAL at 09:09

## 2025-03-04 RX ADMIN — INSULIN LISPRO 6 UNITS: 100 INJECTION, SOLUTION INTRAVENOUS; SUBCUTANEOUS at 16:50

## 2025-03-04 RX ADMIN — METOPROLOL SUCCINATE 12.5 MG: 25 TABLET, EXTENDED RELEASE ORAL at 09:08

## 2025-03-04 RX ADMIN — INSULIN LISPRO 6 UNITS: 100 INJECTION, SOLUTION INTRAVENOUS; SUBCUTANEOUS at 09:17

## 2025-03-04 RX ADMIN — SODIUM CHLORIDE, PRESERVATIVE FREE 10 ML: 5 INJECTION INTRAVENOUS at 20:10

## 2025-03-04 RX ADMIN — SODIUM CHLORIDE, PRESERVATIVE FREE 10 ML: 5 INJECTION INTRAVENOUS at 09:09

## 2025-03-04 RX ADMIN — ACETAMINOPHEN 650 MG: 325 TABLET, FILM COATED ORAL at 20:10

## 2025-03-04 RX ADMIN — ASPIRIN 81 MG: 81 TABLET, CHEWABLE ORAL at 09:09

## 2025-03-04 RX ADMIN — POLYETHYLENE GLYCOL 3350 17 G: 17 POWDER, FOR SOLUTION ORAL at 09:08

## 2025-03-04 RX ADMIN — INSULIN LISPRO 6 UNITS: 100 INJECTION, SOLUTION INTRAVENOUS; SUBCUTANEOUS at 11:45

## 2025-03-04 RX ADMIN — TAMSULOSIN HYDROCHLORIDE 0.4 MG: 0.4 CAPSULE ORAL at 09:08

## 2025-03-04 RX ADMIN — ACETAMINOPHEN 650 MG: 325 TABLET, FILM COATED ORAL at 15:19

## 2025-03-04 RX ADMIN — ENOXAPARIN SODIUM 40 MG: 100 INJECTION SUBCUTANEOUS at 09:08

## 2025-03-04 NOTE — PROGRESS NOTES
Patient is alert and oriented x3. VSS on RA. Up to chair x1 with stedy. Voiding via external catheter. Patient denying pain throughout shift. All safety precautions in place. Bed locked and in lowest position. Call light and belongings within reach. Plan of care to continue.

## 2025-03-04 NOTE — PROGRESS NOTES
Department of Physical Medicine & Rehabilitation  Progress Note    Patient Identification:  Harjinder Mcgee Jr  [id number]  : 1935  Admit date: 2025    Chief Complaint: Closed right hip fracture, initial encounter ([facility])    Subjective:   Patient seen and examined at bedside this morning.  Patient continues to have some right hip pain with movement but otherwise denies any other acute complaints at this time.  Denies any fevers, chest pain, shortness of breath, nausea/vomiting, abdominal pain.    ROS: No f/c, n/v, cp     Objective:  Patient Vitals for the past 24 hrs:   BP Temp Temp src Pulse Resp SpO2 Weight   25 0908 124/68 98.7 °F (37.1 °C) Oral (!) 104 16 97 % --   25 0645 -- -- -- -- -- -- 73.7 kg (162 lb 7.7 oz)   25 0400 (!) 154/80 98.6 °F (37 °C) Oral 86 16 99 % --   25 2355 (!) 144/71 97.9 °F (36.6 °C) Oral 77 16 97 % --   25 2150 (!) 157/73 97.6 °F (36.4 °C) Oral 69 17 97 % --   25 1600 (!) 151/80 97.4 °F (36.3 °C) Oral 88 16 97 % --   25 0944 (!) 108/56 98.3 °F (36.8 °C) Oral 76 16 97 % --     Const: Alert. No distress, pleasant.   HEENT: Normocephalic, atraumatic. Normal sclera/conjunctiva. MMM.   CV: Regular rate and rhythm.   Resp: No respiratory distress. Lungs CTAB.   Abd: Soft, nontender, nondistended  Ext: No edema  Neuro: Alert, oriented, appropriately interactive.   Psych: Cooperative, appropriate mood and affect    Laboratory data: Available via EMR.   Last 24 hour lab  Recent Results (from the past 24 hour(s))   POCT Glucose    Collection Time: 25 11:42 AM   Result Value Ref Range    POC Glucose 121 (H) 70 - 99 mg/dl    Performed on ACCU-CHEK    POCT Glucose    Collection Time: 25  4:23 PM   Result Value Ref Range    POC Glucose 152 (H) 70 - 99 mg/dl    Performed on ACCU-CHEK    POCT Glucose    Collection Time: 25  9:59 PM   Result Value Ref Range    POC Glucose 106 (H) 70 - 99 mg/dl    Performed on ACCU-CHEK    POCT Glucose

## 2025-03-04 NOTE — PLAN OF CARE
Problem: Discharge Planning  Goal: Discharge to home or other facility with appropriate resources  3/4/2025 1004 by Amy Verdin RN  Outcome: Progressing  Pt involved in discharge planning. Barriers to discharge discussed with pt and family. Referral sent to [facility].       Problem: Pain  Goal: Verbalizes/displays adequate comfort level or baseline comfort level  3/4/2025 1004 by Amy Verdin RN  Outcome: Progressing  Pt endorsing pain to right hip when ambulating. Being treated with PRN pain medication, rest, and frequent repositioning with pillow support for comfort and pressure relief. Pt reports some relief from pain with above interventions.      Problem: Safety - Adult  Goal: Free from fall injury  3/4/2025 1004 by Amy Verdin RN  Outcome: Progressing  All fall precautions in place. Bed locked and in lowest position with alarm on. Overbed table and personal belonings within reach. Call light within reach and patient instructed to use call light for assistance. Non-skid socks on.      Problem: ABCDS Injury Assessment  Goal: Absence of physical injury  3/4/2025 1004 by Amy Verdin RN  Outcome: Progressing

## 2025-03-04 NOTE — PROGRESS NOTES
Patient medically stable for discharge on 3/3/2025. Pt remains medically stable for discharge and is awaiting skilled nursing placement. Patient denies any new needs today. Continue with discharge as planned case management arrangement.

## 2025-03-04 NOTE — CARE COORDINATION
Pre-cert pending for [facility] ARU.  Patient's family touring [employer] this morning as back up plan.    4:03 PM  CM spoke with patient's son Thomas.  They will move forward with [facility] SNF if patient's insurance does not approve ARU at [facility].      Bessie Chin RN, BSN,   5T Ortho/Neuro   [phone]

## 2025-03-04 NOTE — PROGRESS NOTES
Physical Therapy  Daily Treatment Note    Discharge Recommendation:  Inpatient Rehab  Equipment Needs:  Defer to next level of care    Assessment:  Pt continues to need 2 person assist for bed mobility and transfers at this time. Able to take a few small steps with walker this session (with great effort and 2 person assist). Pt with good effort and participation despite R hip discomfort--he seems very motivated for recovery. Pt from home with wife (who pt reports is mostly bedbound) and son. Has 4 steps to enter house. He would benefit from continued IP PT at D/C prior to returning home. Plan is for ARU.     At baseline this patient was Independent with functional mobility & ADL's. The current hospitalization has resulted in the patient now being limited with all aspects of mobility, negatively impacting the patient's functional independence, ability to safely access their home environment, and ability to complete valued daily tasks and life roles. Harjinder Mcgee Jr is motivated for recovery and demonstrates the ability to tolerate inpatient rehabilitation 3 hours/day, 5 days/week for optimal return to functional independence, quality of life, and decreased caregiver burden.    Chart Reviewed: Yes     Other Position/Activity Restrictions: WBAT R LE, up in chair, ambulate; Lateral: Precautions for lateral approach:  no active abduction, no hip extension past midline (use a step to gait pattern with surgical lower extremity leading), no external rotation   Additional Pertinent Hx: 89 y.o. male admit 2/27 s/p fall; (+) R femoral neck fx; 2/28 to OR for right Open Treatment Femoral neck fracture with Hemiarthroplasty; PMHx: CABG, Parkinson's, dementia, pacemaker defibrillator, HTN, HLD      Diagnosis: R hip fx   Treatment Diagnosis: impaired gait and transfers    Subjective: Pt in bed initially. Agreeable to working with therapy.   \"I appreciate you all being so kind to me.\"    Pain: c/o R hip discomfort with activity.  mod A x 1  Short Term Goal 4: Pt will amb 10 ft with RW and mod A x 1       Plan:  Plan: 5-7x/week  Current Treatment Recommendations: Strengthening, Functional mobility training, Gait training, Transfer training, Balance training, Patient/Caregiver education & training, Therapeutic activities, Modalities, Home exercise program, Equipment evaluation, education, & procurement, Co-Treatment    Therapy Time    Individual  Concurrent  Group  Co-treatment    Time In         808   Time Out         908   Minutes         60     Timed Code Treatment Minutes: 60  Total Treatment Minutes: 60    [x]co-treatment indicated; patient seen for co-treatment due to: patient safety and need for the assistance of 2 skilled therapists.  PT addressing: [] Sitting balance/LE WB, [x] Standing balance/LE WB, [x]Transfer training, [x] BLE strength/endurance with functional tasks,  [] Other:    Will continue per plan of care.   If patient is discharged prior to next treatment, this note will serve as the discharge summary.    Bridget Arellano, PTA #[id number]    \

## 2025-03-04 NOTE — PROGRESS NOTES
Pt in bed upon arrival. Agreeable to treatment  Pain: Pain of the RLE; no rating provided. Repositioned for comfort.          Cognition  Overall Cognitive Status: Exceptions  Arousal/Alertness: Appears intact  Following Commands: Follows one step commands consistently;Follows one step commands with increased time  Safety Judgement: Decreased awareness of need for safety;Decreased awareness of need for assistance  Insights: Decreased awareness of deficits  Initiation: Requires cues for some  Sequencing: Requires cues for some  Cognition Comment: Kiana, hears best out of the R ear      Social/Functional History  Social/Functional History  Lives With: Family (wife (\"she's in a bed\"), dtr; son is in and out)  Type of Home: House  Home Layout: Multi-level (pt uses bedroom and bathroom in the basement)  Entrance Stairs - Number of Steps: 3-4 GEETHA  Bathroom Shower/Tub: Tub/Shower unit  Bathroom Toilet: Handicap height  Home Equipment: Cane  Prior Level of Assist for ADLs: Independent  Prior Level of Assist for Homemaking:  (shares with children)  Prior Level of Assist for Ambulation: Independent community ambulator, with or without device (with cane)  Prior Level of Assist for Transfers: Independent  Active : Yes  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  Additional Comments: Wife receives services at home.  Pt reports he has had \"some\" falls; pt is questionable historian 2* cognition and Kiana. Son arrived at end of session and able to confirm home setup.      Objective      Bed mobility  Supine to sit: Dependent (Mod assist x 2), HOB up with use of railing  Scooting: CGA to EOB     Transfers  Sit to stand: Dependent (Min assist + Mod assist) from raised height bed to Letty Stedy; Min assist x 1 from Letty Seat (twice); Dependent (Mod assist x 2) from chair to walker. Cues for hand placement.   Stand to sit: Dependent (Mod assist x 2) into  None  AM-[facility] Inpatient Daily Activity Raw Score: 14  AM-PAC Inpatient ADL T-Scale Score : 33.39  ADL Inpatient CMS 0-100% Score: 59.67  ADL Inpatient CMS G-Code Modifier : CK    Therapy Time   Individual Concurrent Group Co-treatment   Time In       0808   Time Out       0908   Minutes       60   Timed Code Treatment Minutes: 60 Minutes       [x]co-treatment indicated; patient seen for co-treatment due to: patient safety, patient endurance, and need for the assistance of 2 skilled therapists.    PT addressing: [] Sitting balance/LE WB, [] Standing balance/LE WB, []Transfer training, [] BLE strength/endurance with functional tasks,  [] Other:  OT addressing: [x]ADL's, [] Pericare,  [x]clothing management, [x] BU E strength/endurance with functional tasks,  [] UE WB [] Other:      ASTRID Masters/ALEX

## 2025-03-04 NOTE — PLAN OF CARE
Problem: Chronic Conditions and Co-morbidities  Goal: Patient's chronic conditions and co-morbidity symptoms are monitored and maintained or improved  3/4/2025 0132 by Kimber Chen RN  Outcome: Progressing     Problem: Discharge Planning  Goal: Discharge to home or other facility with appropriate resources  3/4/2025 0132 by Kimber Chen RN  Outcome: Progressing     Problem: Pain  Goal: Verbalizes/displays adequate comfort level or baseline comfort level  3/4/2025 0132 by Kimber Chen RN  Outcome: Progressing   No c/o pain at this time, will continue to monitor and give interventions as indicated    Problem: Skin/Tissue Integrity  Goal: Skin integrity remains intact  Description: 1.  Monitor for areas of redness and/or skin breakdown  2.  Assess vascular access sites hourly  3.  Every 4-6 hours minimum:  Change oxygen saturation probe site  4.  Every 4-6 hours:  If on nasal continuous positive airway pressure, respiratory therapy assess nares and determine need for appliance change or resting period  3/4/2025 0132 by Kimber Chen RN  Outcome: Progressing  Flowsheets (Taken 3/4/2025 0129)  Skin Integrity Remains Intact: Monitor for areas of redness and/or skin breakdown   Skin assessed this shift. Anum care completed as necessary. Patient alternating positions to reduce pressure and prevent skin injury q2 and as needed.     Problem: Safety - Adult  Goal: Free from fall injury  3/4/2025 0132 by Kimber Chen RN  Outcome: Progressing   All fall precautions in place. Bed locked and in lowest position with alarm on. Overbed table and personal belonings within reach. Call light within reach and patient instructed to use call light for assistance. Non-skid socks on.    Problem: ABCDS Injury Assessment  Goal: Absence of physical injury  3/4/2025 0132 by Kimber Chen RN  Outcome: Progressing       Problem: Confusion  Goal: Confusion, delirium, dementia, or psychosis is improved or at

## 2025-03-04 NOTE — PLAN OF CARE
Problem: Chronic Conditions and Co-morbidities  Goal: Patient's chronic conditions and co-morbidity symptoms are monitored and maintained or improved  Outcome: Progressing  Flowsheets (Taken 3/3/2025 0830)  Care Plan - Patient's Chronic Conditions and Co-Morbidity Symptoms are Monitored and Maintained or Improved: Monitor and assess patient's chronic conditions and comorbid symptoms for stability, deterioration, or improvement     Problem: Discharge Planning  Goal: Discharge to home or other facility with appropriate resources  Outcome: Progressing  Flowsheets (Taken 3/3/2025 0830)  Discharge to home or other facility with appropriate resources: Identify barriers to discharge with patient and caregiver     Problem: Pain  Goal: Verbalizes/displays adequate comfort level or baseline comfort level  Outcome: Progressing     Problem: Skin/Tissue Integrity  Goal: Skin integrity remains intact  Description: 1.  Monitor for areas of redness and/or skin breakdown  2.  Assess vascular access sites hourly  3.  Every 4-6 hours minimum:  Change oxygen saturation probe site  4.  Every 4-6 hours:  If on nasal continuous positive airway pressure, respiratory therapy assess nares and determine need for appliance change or resting period  Outcome: Progressing     Problem: Safety - Adult  Goal: Free from fall injury  Outcome: Progressing  Flowsheets (Taken 3/3/2025 0930)  Free From Fall Injury: Instruct family/caregiver on patient safety     Problem: ABCDS Injury Assessment  Goal: Absence of physical injury  Outcome: Progressing

## 2025-03-05 LAB
ANION GAP SERPL CALCULATED.3IONS-SCNC: 8 MMOL/L (ref 3–16)
BUN SERPL-MCNC: 15 MG/DL (ref 7–20)
CALCIUM SERPL-MCNC: 8.9 MG/DL (ref 8.3–10.6)
CHLORIDE SERPL-SCNC: 105 MMOL/L (ref 99–110)
CO2 SERPL-SCNC: 29 MMOL/L (ref 21–32)
CREAT SERPL-MCNC: 0.7 MG/DL (ref 0.8–1.3)
EST. AVERAGE GLUCOSE BLD GHB EST-MCNC: 128.4 MG/DL
GFR SERPLBLD CREATININE-BSD FMLA CKD-EPI: 89 ML/MIN/{1.73_M2}
GLUCOSE BLD-MCNC: 109 MG/DL (ref 70–99)
GLUCOSE BLD-MCNC: 133 MG/DL (ref 70–99)
GLUCOSE BLD-MCNC: 357 MG/DL (ref 70–99)
GLUCOSE BLD-MCNC: 63 MG/DL (ref 70–99)
GLUCOSE BLD-MCNC: 76 MG/DL (ref 70–99)
GLUCOSE BLD-MCNC: 97 MG/DL (ref 70–99)
GLUCOSE BLD-MCNC: 98 MG/DL (ref 70–99)
GLUCOSE SERPL-MCNC: 94 MG/DL (ref 70–99)
HBA1C MFR BLD: 6.1 %
PERFORMED ON: ABNORMAL
PERFORMED ON: NORMAL
POTASSIUM SERPL-SCNC: 4.5 MMOL/L (ref 3.5–5.1)
SODIUM SERPL-SCNC: 142 MMOL/L (ref 136–145)

## 2025-03-05 PROCEDURE — 2500000003 HC RX 250 WO HCPCS: Performed by: PHYSICIAN ASSISTANT

## 2025-03-05 PROCEDURE — 6370000000 HC RX 637 (ALT 250 FOR IP): Performed by: PHYSICIAN ASSISTANT

## 2025-03-05 PROCEDURE — 6360000002 HC RX W HCPCS: Performed by: NURSE PRACTITIONER

## 2025-03-05 PROCEDURE — 97116 GAIT TRAINING THERAPY: CPT

## 2025-03-05 PROCEDURE — 80048 BASIC METABOLIC PNL TOTAL CA: CPT

## 2025-03-05 PROCEDURE — 1200000000 HC SEMI PRIVATE

## 2025-03-05 PROCEDURE — 97535 SELF CARE MNGMENT TRAINING: CPT

## 2025-03-05 PROCEDURE — 97530 THERAPEUTIC ACTIVITIES: CPT

## 2025-03-05 PROCEDURE — 36415 COLL VENOUS BLD VENIPUNCTURE: CPT

## 2025-03-05 PROCEDURE — 83036 HEMOGLOBIN GLYCOSYLATED A1C: CPT

## 2025-03-05 PROCEDURE — 97110 THERAPEUTIC EXERCISES: CPT

## 2025-03-05 RX ADMIN — INSULIN LISPRO 6 UNITS: 100 INJECTION, SOLUTION INTRAVENOUS; SUBCUTANEOUS at 18:00

## 2025-03-05 RX ADMIN — ACETAMINOPHEN 650 MG: 325 TABLET, FILM COATED ORAL at 16:28

## 2025-03-05 RX ADMIN — ENOXAPARIN SODIUM 40 MG: 100 INJECTION SUBCUTANEOUS at 10:07

## 2025-03-05 RX ADMIN — POLYETHYLENE GLYCOL 3350 17 G: 17 POWDER, FOR SOLUTION ORAL at 10:07

## 2025-03-05 RX ADMIN — INSULIN LISPRO 6 UNITS: 100 INJECTION, SOLUTION INTRAVENOUS; SUBCUTANEOUS at 12:05

## 2025-03-05 RX ADMIN — OXYCODONE 5 MG: 5 TABLET ORAL at 20:35

## 2025-03-05 RX ADMIN — TAMSULOSIN HYDROCHLORIDE 0.4 MG: 0.4 CAPSULE ORAL at 10:07

## 2025-03-05 RX ADMIN — ACETAMINOPHEN 650 MG: 325 TABLET, FILM COATED ORAL at 20:37

## 2025-03-05 RX ADMIN — METOPROLOL SUCCINATE 12.5 MG: 25 TABLET, EXTENDED RELEASE ORAL at 10:07

## 2025-03-05 RX ADMIN — ACETAMINOPHEN 650 MG: 325 TABLET, FILM COATED ORAL at 10:07

## 2025-03-05 RX ADMIN — SODIUM CHLORIDE, PRESERVATIVE FREE 10 ML: 5 INJECTION INTRAVENOUS at 10:13

## 2025-03-05 RX ADMIN — ASPIRIN 81 MG: 81 TABLET, CHEWABLE ORAL at 10:07

## 2025-03-05 RX ADMIN — POLYETHYLENE GLYCOL 3350 17 G: 17 POWDER, FOR SOLUTION ORAL at 20:44

## 2025-03-05 RX ADMIN — OXYCODONE 5 MG: 5 TABLET ORAL at 16:28

## 2025-03-05 ASSESSMENT — PAIN DESCRIPTION - ORIENTATION
ORIENTATION: RIGHT
ORIENTATION: RIGHT

## 2025-03-05 ASSESSMENT — PAIN - FUNCTIONAL ASSESSMENT
PAIN_FUNCTIONAL_ASSESSMENT: PREVENTS OR INTERFERES SOME ACTIVE ACTIVITIES AND ADLS
PAIN_FUNCTIONAL_ASSESSMENT: PREVENTS OR INTERFERES SOME ACTIVE ACTIVITIES AND ADLS

## 2025-03-05 ASSESSMENT — PAIN SCALES - GENERAL
PAINLEVEL_OUTOF10: 0
PAINLEVEL_OUTOF10: 2
PAINLEVEL_OUTOF10: 5

## 2025-03-05 ASSESSMENT — PAIN DESCRIPTION - DESCRIPTORS
DESCRIPTORS: ACHING;TENDER
DESCRIPTORS: ACHING

## 2025-03-05 ASSESSMENT — PAIN DESCRIPTION - LOCATION
LOCATION: HIP
LOCATION: HIP

## 2025-03-05 NOTE — PROGRESS NOTES
Physical Therapy  Daily Treatment Note    Discharge Recommendation:  Inpatient Rehab  Equipment Needs:  Defer to next level of care    Assessment:  Pt with good effort and participation throughout session. Continues to need heavy assist x 2 for transfers and to take a few steps with walker. Pt is making gradual progress and seems very motivated for recovery. Currently functioning well below his baseline s/p R hip fx/repair. He would benefit from continued IP PT at D/C prior to returning home. Plan is for ARU.     Chart Reviewed: Yes     Other Position/Activity Restrictions: WBAT R LE, up in chair, ambulate; Lateral: Precautions for lateral approach:  no active abduction, no hip extension past midline (use a step to gait pattern with surgical lower extremity leading), no external rotation   Additional Pertinent Hx: 89 y.o. male admit 2/27 s/p fall; (+) R femoral neck fx; 2/28 to OR for right Open Treatment Femoral neck fracture with Hemiarthroplasty; PMHx: CABG, Parkinson's, dementia, pacemaker defibrillator, HTN, HLD      Diagnosis: R hip fx   Treatment Diagnosis: impaired gait and transfers    Subjective: Pt in chair. Daughter present initially, but left at start of session.   Pt in good spirits and agreeable to working with therapy.     Pain: c/o R hip/upper leg discomfort, especially with activity. Unable to rate. Ice packs to area after therapy session.     Objective:    Exercises  Sitting upright in chair:  15 reps B heel raises, toe raises with cues/demonstration only  10 reps B SAQ (mod assist bilaterally). Pt needing cues for quality and pace of exercises.     Transfers  Sit to stand: Dependent (Mod assist x 2) from chair (x 3 times)  Stand to sit: Dependent (Mod assist x 2) into chair (x 3 times)  Chair <> chair: Dependent (Mod to Max assist x 2) with rolling walker    Ambulation  Assistance Level: Dependent (Mod assist x 2 on 1st trial, Max assist x 1 on 2nd trial)  Assistive device: Rolling

## 2025-03-05 NOTE — PLAN OF CARE
Problem: Skin/Tissue Integrity  Goal: Skin integrity remains intact  Description: 1.  Monitor for areas of redness and/or skin breakdown  2.  Assess vascular access sites hourly  3.  Every 4-6 hours minimum:  Change oxygen saturation probe site  4.  Every 4-6 hours:  If on nasal continuous positive airway pressure, respiratory therapy assess nares and determine need for appliance change or resting period  3/4/2025 2258 by Cristina Strong, RN  Outcome: Progressing  Flowsheets (Taken 3/4/2025 2000)  Skin Integrity Remains Intact: Monitor for areas of redness and/or skin breakdown  3/4/2025 1004 by Amy Verdin, RN  Outcome: Progressing   Skin assessed this shift. Anum care completed as necessary. Patient alternating positions to reduce pressure and prevent skin injury q2 and as needed.     Problem: Safety - Adult  Goal: Free from fall injury  3/4/2025 2258 by Cristina Strong, RN  Outcome: Progressing  Flowsheets (Taken 3/4/2025 2000)  Free From Fall Injury: Instruct family/caregiver on patient safety  3/4/2025 1004 by Amy Verdin, RN  Outcome: Progressing   All fall precautions in place. Bed locked and in lowest position with alarm on. Overbed table and personal belonings within reach. Call light within reach and patient instructed to use call light for assistance. Non-skid socks on.    Problem: Confusion  Goal: Confusion, delirium, dementia, or psychosis is improved or at baseline  Description: INTERVENTIONS:  1. Assess for possible contributors to thought disturbance, including medications, impaired vision or hearing, underlying metabolic abnormalities, dehydration, psychiatric diagnoses, and notify attending LIP  2. [place] high risk fall precautions, as indicated  3. Provide frequent short contacts to provide reality reorientation, refocusing and direction  4. Decrease environmental stimuli, including noise as appropriate  5. Monitor and intervene to maintain adequate nutrition, hydration, elimination, sleep

## 2025-03-05 NOTE — PROGRESS NOTES
Occupational Therapy  Facility/Department: [facility] ORTHO/NEURO  Occupational Therapy Treatment    Name: Harjinder Mcgee Jr  : 1935  MRN: [id number]  Date of Service: 3/5/2025    Discharge Recommendations:  IP Rehab  OT Equipment Recommendations  Equipment Needed: No  Other: defer recommendations to discharge facility       Patient Diagnosis(es): The primary encounter diagnosis was Closed displaced fracture of right femoral neck (HCC). Diagnoses of Fall, initial encounter and Closed right hip fracture, initial encounter (HCC) were also pertinent to this visit.  Past Medical History:  has a past medical history of Arthritis, Balance disorder, CAD (coronary artery disease), Chest pain, Dental disease, Dizziness, Dyslipidemia, Fatigue, Hard of hearing, History of prostate cancer, Hypertension, Hyperthyroidism, Impaired memory, SOB (shortness of breath), and Vitamin D deficiency.  Past Surgical History:  has a past surgical history that includes Cardiac surgery and hip surgery (Right, 2025).    Treatment Diagnosis: impaired ADLs/transfers s/p RIGHT HIP HEMIARTHROPLASTY.      Assessment  Performance deficits / Impairments: Decreased functional mobility ;Decreased ADL status;Decreased endurance;Decreased balance  Assessment: Pt with good effort and participation throughout session. Continues to need heavy assist x 2 for transfers and to take a few steps with walker. Pt is making gradual progress and seems very motivated for recovery. Currently functioning well below his baseline s/p R hip fx/repair. He would benefit from continued IP OT at D/C prior to returning home. Plan is for ARU.  Treatment Diagnosis: impaired ADLs/transfers s/p RIGHT HIP HEMIARTHROPLASTY.  Activity Tolerance  Activity Tolerance: Patient limited by fatigue     Plan  Occupational Therapy Plan  Times Per Week: 5-7  Current Treatment Recommendations: Strengthening, Balance training, Functional mobility training, Endurance training, Safety  Detail Level: Detailed

## 2025-03-05 NOTE — PLAN OF CARE
Problem: Chronic Conditions and Co-morbidities  Goal: Patient's chronic conditions and co-morbidity symptoms are monitored and maintained or improved  Outcome: Progressing     Problem: Discharge Planning  Goal: Discharge to home or other facility with appropriate resources  Outcome: Progressing     Problem: Pain  Goal: Verbalizes/displays adequate comfort level or baseline comfort level  Outcome: Progressing     Problem: Skin/Tissue Integrity  Goal: Skin integrity remains intact  Description: 1.  Monitor for areas of redness and/or skin breakdown  2.  Assess vascular access sites hourly  3.  Every 4-6 hours minimum:  Change oxygen saturation probe site  4.  Every 4-6 hours:  If on nasal continuous positive airway pressure, respiratory therapy assess nares and determine need for appliance change or resting period  3/5/2025 1019 by Patricia Todd RN  Outcome: Progressing     Problem: Safety - Adult  Goal: Free from fall injury  3/5/2025 1019 by Patricia Todd RN  Outcome: Progressing     Problem: ABCDS Injury Assessment  Goal: Absence of physical injury  Outcome: Progressing     Problem: Confusion  Goal: Confusion, delirium, dementia, or psychosis is improved or at baseline  Description: INTERVENTIONS:  1. Assess for possible contributors to thought disturbance, including medications, impaired vision or hearing, underlying metabolic abnormalities, dehydration, psychiatric diagnoses, and notify attending LIP  2. [place] high risk fall precautions, as indicated  3. Provide frequent short contacts to provide reality reorientation, refocusing and direction  4. Decrease environmental stimuli, including noise as appropriate  5. Monitor and intervene to maintain adequate nutrition, hydration, elimination, sleep and activity  6. If unable to ensure safety without constant attention obtain sitter and review sitter guidelines with assigned personnel  7. Initiate Psychosocial CNS and Spiritual Care consult, as

## 2025-03-05 NOTE — PROGRESS NOTES
VSS, afebrile. Alert and oriented x3 at beginning of shift with increased confusion overnight. No c/o pain at this time, will continue to monitor and give interventions as indicated  Delirium protocol orders obtained. No acute events overnight. All fall & safety precautions in place. Call light within reach. Continue current plan of care.

## 2025-03-05 NOTE — PROGRESS NOTES
Department of Physical Medicine & Rehabilitation  Progress Note    Patient Identification:  Harjinder Mcgee Jr  [id number]  : 1935  Admit date: 2025    Chief Complaint: Closed right hip fracture, initial encounter ([facility])    Subjective:   Patient seen and examined at bedside this morning.  Patient continues to have some right hip pain with movement but otherwise denies any other acute complaints at this time.  Denies any fevers, chest pain, shortness of breath, nausea/vomiting, abdominal pain.    ROS: No f/c, n/v, cp     Objective:  Patient Vitals for the past 24 hrs:   BP Temp Temp src Pulse Resp SpO2 Weight   25 0945 (!) 143/75 97.6 °F (36.4 °C) Temporal 92 18 96 % --   25 0515 (!) 144/71 98.1 °F (36.7 °C) Oral 86 17 96 % 74.4 kg (163 lb 15.3 oz)   25 2000 (!) 151/75 97.6 °F (36.4 °C) Oral 87 16 97 % --   25 1558 136/74 98.2 °F (36.8 °C) Oral 85 15 100 % --   25 1530 134/77 97.8 °F (36.6 °C) Oral 87 16 98 % --   25 1132 133/64 97.4 °F (36.3 °C) Oral 91 16 97 % --     Const: Alert. No distress, pleasant.   HEENT: Normocephalic, atraumatic. Normal sclera/conjunctiva. MMM.   CV: Regular rate and rhythm.   Resp: No respiratory distress. Lungs CTAB.   Abd: Soft, nontender, nondistended  Ext: No edema  Neuro: Alert, oriented, appropriately interactive.   Psych: Cooperative, appropriate mood and affect    Laboratory data: Available via EMR.   Last 24 hour lab  Recent Results (from the past 24 hour(s))   POCT Glucose    Collection Time: 25 11:11 AM   Result Value Ref Range    POC Glucose 478 (H) 70 - 99 mg/dl    Performed on ACCU-CHEK    POCT Glucose    Collection Time: 25 11:18 AM   Result Value Ref Range    POC Glucose 416 (H) 70 - 99 mg/dl    Performed on ACCU-CHEK    POCT Glucose    Collection Time: 25  4:05 PM   Result Value Ref Range    POC Glucose 172 (H) 70 - 99 mg/dl    Performed on ACCU-CHEK    POCT Glucose    Collection Time: 25  8:16 PM

## 2025-03-05 NOTE — CARE COORDINATION
CM received a message that p2p completed and was denied.  CM spoke with patient's son Thomas.  They are agreeable to [facility].  Will need pre-cert.      Patient's pre-cert approved for SNF: auth# [id number] for [facility]     CM spoke with patient's son Thomas. He would like to get patient set up for transport tomorrow morning when he or another sibling can be here with him when he leaves.  CM arranged transport for 1100 on 3/6/25 to [facility].    Bessie Chin, RN, BSN,   5T Ortho/Neuro   [phone]

## 2025-03-05 NOTE — PROGRESS NOTES
Patient medically stable for discharge on 3/3/25. Pt remains medically stable for discharge and is awaiting skilled nursing placement. A1c checked for monitoring blood sugars. A1c 6.1 (3/5). Patient denies any new needs today. Precert for ARU was denied. P2P completed today per this provider. Insurance to reach out to . Continue with discharge as planned case management arrangement.

## 2025-03-05 NOTE — CARE COORDINATION
Precert started on [employer] portal for [facility] SNF authorization request [id number] has been submitted. The authorization is pending for clinical review. Electronically signed by Lindsey Calloway RN on 3/5/2025 at 2:52 PM  [phone]    1626 ** received call from [facility] that they auto approved a SNF stay for ot under auth# [id number] for [facility].    Electronically signed by Lindsey Calloway RN on 3/5/2025 at 4:22 PM  [phone]

## 2025-03-06 VITALS
SYSTOLIC BLOOD PRESSURE: 135 MMHG | OXYGEN SATURATION: 97 % | WEIGHT: 163.96 LBS | RESPIRATION RATE: 18 BRPM | TEMPERATURE: 97.7 F | HEIGHT: 74 IN | BODY MASS INDEX: 21.04 KG/M2 | HEART RATE: 92 BPM | DIASTOLIC BLOOD PRESSURE: 72 MMHG

## 2025-03-06 LAB
GLUCOSE BLD-MCNC: 114 MG/DL (ref 70–99)
GLUCOSE BLD-MCNC: 72 MG/DL (ref 70–99)
PERFORMED ON: ABNORMAL
PERFORMED ON: NORMAL

## 2025-03-06 PROCEDURE — 6360000002 HC RX W HCPCS: Performed by: NURSE PRACTITIONER

## 2025-03-06 PROCEDURE — 6370000000 HC RX 637 (ALT 250 FOR IP): Performed by: PHYSICIAN ASSISTANT

## 2025-03-06 PROCEDURE — 2500000003 HC RX 250 WO HCPCS: Performed by: PHYSICIAN ASSISTANT

## 2025-03-06 RX ADMIN — METOPROLOL SUCCINATE 12.5 MG: 25 TABLET, EXTENDED RELEASE ORAL at 09:16

## 2025-03-06 RX ADMIN — ACETAMINOPHEN 650 MG: 325 TABLET, FILM COATED ORAL at 04:49

## 2025-03-06 RX ADMIN — ACETAMINOPHEN 650 MG: 325 TABLET, FILM COATED ORAL at 09:16

## 2025-03-06 RX ADMIN — POLYETHYLENE GLYCOL 3350 17 G: 17 POWDER, FOR SOLUTION ORAL at 09:16

## 2025-03-06 RX ADMIN — ASPIRIN 81 MG: 81 TABLET, CHEWABLE ORAL at 09:17

## 2025-03-06 RX ADMIN — TAMSULOSIN HYDROCHLORIDE 0.4 MG: 0.4 CAPSULE ORAL at 09:16

## 2025-03-06 RX ADMIN — SODIUM CHLORIDE, PRESERVATIVE FREE 10 ML: 5 INJECTION INTRAVENOUS at 09:17

## 2025-03-06 RX ADMIN — ENOXAPARIN SODIUM 40 MG: 100 INJECTION SUBCUTANEOUS at 09:16

## 2025-03-06 RX ADMIN — SODIUM CHLORIDE, PRESERVATIVE FREE 10 ML: 5 INJECTION INTRAVENOUS at 09:18

## 2025-03-06 NOTE — CARE COORDINATION
Case Management Assessment            Discharge Note                    Date / Time of Note: 3/6/2025 8:39 AM                  Discharge Note Completed by: Bessie Chin RN    Patient Name: Harjinder Mcgee Jr   YOB: 1935  Diagnosis: Fall, initial encounter [W19.XXXA]  Closed right hip fracture, initial encounter ([facility]) [S72.001A]  Closed displaced fracture of right femoral neck ([facility]) [S72.001A]   Date / Time: 2/27/2025  4:02 PM    Current PCP: Jorge Will MD  Clinic patient: No    Hospitalization in the last 30 days: No       Advance Directives:  Code Status: Full Code  [place] DNR form completed and on chart: Not Indicated    Financial:  Payor: [employer] MEDICARE / Plan: [employer] MEDIBLUE ESSENTIAL/PLUS / Product Type: *No Product type* /      Pharmacy:    [id number] [address]  - P [phone] - F [phone]  [address]  [facility] [address]  Phone: [phone] Fax: [phone]    [facility] #[id number] [address] - P [phone] - F [phone]  [address] [place] [address]  Phone: [phone] Fax: [phone]      Assistance purchasing medications?:    Assistance provided by Case Management: None at this time    Does patient want to participate in local refill/ meds to beds program?: No    Meds To Beds General Rules:  1. Can ONLY be done Monday- Friday between 8:30am-5pm  2. Prescription(s) must be in pharmacy by 3pm to be filled same day  3.Copy of patient's insurance/ prescription drug card and patient face sheet must be sent along with the prescription(s)  4. Cost of Rx cannot be added to hospital bill. If financial assistance is needed, please contact unit  or ;  or  CANNOT provide pharmacy voucher for patients co-pays  5. Patients can then  the prescription on their way out of the hospital at discharge, or pharmacy can deliver to  [facility].  Orders faxed to [phone], nurse to call report to [phone].  Patient is scheduled for transport at 1100 via [facility].  Patient's family aware and agreeable to plan.    COVID Result:    Lab Results   Component Value Date/Time    COVID19 DETECTED 11/04/2022 11:00 PM       The Plan for Transition of Care is related to the following treatment goals of Fall, initial encounter [W19.XXXA]  Closed right hip fracture, initial encounter (HCC) [S72.001A]  Closed displaced fracture of right femoral neck (HCC) [S72.001A]    The Patient and/or patient representative Harjinder and his family were provided with a choice of provider and agrees with the discharge plan Yes    Freedom of choice list was provided with basic dialogue that supports the patient's individualized plan of care/goals and shares the quality data associated with the providers. Yes    Care Transitions patient: No    Bessie Chin RN  [facility]  Case Management Department  Ph: [phone]  Fax: [phone]

## 2025-03-06 NOTE — PLAN OF CARE
Problem: Pain  Goal: Verbalizes/displays adequate comfort level or baseline comfort level  3/5/2025 2335 by Adam Pisano, RN  Outcome: Progressing   Medicated pt per orders, please see e-Mar. Will continue to monitor for comfort and follow POC.    Problem: Safety - Adult  Goal: Free from fall injury  3/5/2025 2335 by Adam Pisano, RN  Outcome: Progressing   Pt remains free from injury during this shift. Pt is a max assist x 3 person. Encourage pt to call for all assistance. Call light is in reach, bed alarm is activated for safety, bed locked and in lowest position. Will continue to monitor and follow POC.

## 2025-03-06 NOTE — PLAN OF CARE
Problem: Chronic Conditions and Co-morbidities  Goal: Patient's chronic conditions and co-morbidity symptoms are monitored and maintained or improved  Outcome: Completed  Flowsheets (Taken 3/6/2025 0800)  Care Plan - Patient's Chronic Conditions and Co-Morbidity Symptoms are Monitored and Maintained or Improved:   Monitor and assess patient's chronic conditions and comorbid symptoms for stability, deterioration, or improvement   Collaborate with multidisciplinary team to address chronic and comorbid conditions and prevent exacerbation or deterioration   Update acute care plan with appropriate goals if chronic or comorbid symptoms are exacerbated and prevent overall improvement and discharge

## 2025-03-06 NOTE — PROGRESS NOTES
Department of Physical Medicine & Rehabilitation  Progress Note    Patient Identification:  Harjinder Mcgee Jr  [id number]  : 1935  Admit date: 2025    Chief Complaint: Closed right hip fracture, initial encounter (Formerly [facility])    Subjective:   Patient seen and examined at bedside this morning.  Patient continues to have some right hip pain with movement but otherwise denies any other acute complaints at this time.  Denies any fevers, chest pain, shortness of breath, nausea/vomiting, abdominal pain.    ROS: No f/c, n/v, cp     Objective:  Patient Vitals for the past 24 hrs:   BP Temp Temp src Pulse Resp SpO2   25 0915 135/72 97.7 °F (36.5 °C) Oral 92 18 97 %   25 0430 (!) 145/73 98.2 °F (36.8 °C) Oral 78 20 98 %   25 0009 (!) 147/84 97.9 °F (36.6 °C) Oral 81 16 100 %   25 124/62 98.2 °F (36.8 °C) Oral -- 18 96 %   25 1658 -- -- -- -- 16 --   25 1628 -- -- -- -- 17 --   25 1601 132/68 97.4 °F (36.3 °C) Temporal 80 18 99 %   25 1245 107/65 97.5 °F (36.4 °C) Temporal 85 17 97 %     Const: Alert. No distress, pleasant.   HEENT: Normocephalic, atraumatic. Normal sclera/conjunctiva. MMM.   CV: Regular rate and rhythm.   Resp: No respiratory distress. Lungs CTAB.   Abd: Soft, nontender, nondistended  Ext: No edema  Neuro: Alert, oriented, appropriately interactive.   Psych: Cooperative, appropriate mood and affect    Laboratory data: Available via EMR.   Last 24 hour lab  Recent Results (from the past 24 hour(s))   POCT Glucose    Collection Time: 25 10:04 AM   Result Value Ref Range    POC Glucose 109 (H) 70 - 99 mg/dl    Performed on ACCU-CHEK    POCT Glucose    Collection Time: 25 11:25 AM   Result Value Ref Range    POC Glucose 133 (H) 70 - 99 mg/dl    Performed on ACCU-CHEK    POCT Glucose    Collection Time: 25  3:59 PM   Result Value Ref Range    POC Glucose 98 70 - 99 mg/dl    Performed on ACCU-CHEK    POCT Glucose    Collection Time: 25   5:57 PM   Result Value Ref Range    POC Glucose 357 (H) 70 - 99 mg/dl    Performed on ACCU-CHEK    POCT Glucose    Collection Time: 03/05/25  8:35 PM   Result Value Ref Range    POC Glucose 63 (L) 70 - 99 mg/dl    Performed on ACCU-CHEK    POCT Glucose    Collection Time: 03/05/25  8:54 PM   Result Value Ref Range    POC Glucose 76 70 - 99 mg/dl    Performed on ACCU-CHEK    POCT Glucose    Collection Time: 03/06/25  4:52 AM   Result Value Ref Range    POC Glucose 72 70 - 99 mg/dl    Performed on ACCU-CHEK    POCT Glucose    Collection Time: 03/06/25  7:40 AM   Result Value Ref Range    POC Glucose 114 (H) 70 - 99 mg/dl    Performed on ACCU-CHEK        Therapy progress:  PT  Position Activity Restriction  Other Position/Activity Restrictions: WBAT R LE, up in chair, ambulate; Lateral: Precautions for lateral approach:  no active abduction, no hip extension past midline (use a step to gait pattern with surgical lower extremity leading), no external rotation  Objective     Sit to Stand: 2 Person Assistance, Substantial/Maximal assistance (max A x 2 from EOB to RW and stedy; from katlyn stedy seat)  Stand to Sit: 2 Person Assistance, Substantial/Maximal assistance  Bed to Chair: Dependent/Total (via katlyn stedy)     OT  PT Equipment Recommendations  Equipment Needed: No  Other: defer to next level of care     Assessment        SLP          Body mass index is 21.05 kg/m².    Assessment and Plan:  -Precert denied, going to SNF level of care  - will sign off      Janee Man MD  Internal Medicine, PGY3    This patient has been seen, examined, and discussed with the resident. I was part of the key critical services provided to the patient. I agree with the residents documentation. This note may have been altered to reflect my own examination findings, impression, and recommendations.     Anthony Eckert D.O. M.P.H  PM&R  3/6/2025  11:29 AM

## 2025-03-06 NOTE — PROGRESS NOTES
Pt is alert and oriented. VSS. Pt had a low blood sugar of 63. Pt is confused at baseline. Attempted to give glucose tabs. Pt only took 1. BS came up to 76. Pt rested. Rechecked and BS 72. Will continue to monitor.

## 2025-03-11 NOTE — PROGRESS NOTES
Physician Progress Note      PATIENT:               ALBERT ACUÑA JR  CSN #:                  [id number]  :                       1935  ADMIT DATE:       2025 4:02 PM  DISCH DATE:        3/6/2025 11:25 AM  RESPONDING  PROVIDER #:        STEFAN MARQUEZ          QUERY TEXT:    Pt admitted with right femur fracture. Pt noted to have osteoporosis. If   possible, please document in progress notes and discharge summary if you are   evaluating and/or treating any of the following:    The medical record reflects the following:  Risk Factors: age, osteoporosis    Clinical Indicators:  Ortho consult note- \"Osteoporosis management:  I discussed the patient's postoperative fracture care.  Patient has   osteoporosis evidenced by the current  healing break.  Patient is a additional   risk for having another break.  We reviewed evidence based on guidelines.  I   recommend endocrinology work-up and follow-up.  Patient needs extensive bone   health work-up to determine any concomitant risks or predisposing factors   worsening osteoporosis.  I recommend calcium and vitamin D supplementation in   the meantime.  She may be a candidate for additional bone density modifying   medications.\"    Treatment: right hip arthroplasty  Options provided:  -- Osteoporotic right femur fracture following fall which would not usually   break a normal, healthy bone  -- Other - I will add my own diagnosis  -- Disagree - Not applicable / Not valid  -- Disagree - Clinically unable to determine / Unknown  -- Refer to Clinical Documentation Reviewer    PROVIDER RESPONSE TEXT:    This patient has an osteoporotic right femur fracture following fall which   would not break a normal, healthy bone.    Query created by: Anne Marie Du on 3/4/2025 2:30 PM      Electronically signed by:  STEFAN MARQUEZ 3/11/2025 10:14 AM

## 2025-03-21 ENCOUNTER — OFFICE VISIT (OUTPATIENT)
Dept: ORTHOPEDIC SURGERY | Age: 89
End: 2025-03-21

## 2025-03-21 VITALS — BODY MASS INDEX: 20.92 KG/M2 | WEIGHT: 163 LBS | HEIGHT: 74 IN

## 2025-03-21 DIAGNOSIS — Z96.649 S/P HIP HEMIARTHROPLASTY: Primary | ICD-10-CM

## 2025-03-21 PROCEDURE — 99024 POSTOP FOLLOW-UP VISIT: CPT | Performed by: PHYSICIAN ASSISTANT

## 2025-04-03 ENCOUNTER — OFFICE VISIT (OUTPATIENT)
Dept: CARDIOLOGY CLINIC | Age: 89
End: 2025-04-03
Payer: MEDICARE

## 2025-04-03 VITALS
SYSTOLIC BLOOD PRESSURE: 122 MMHG | HEART RATE: 80 BPM | WEIGHT: 151 LBS | DIASTOLIC BLOOD PRESSURE: 70 MMHG | HEIGHT: 74 IN | BODY MASS INDEX: 19.38 KG/M2

## 2025-04-03 DIAGNOSIS — R26.89 BALANCE DISORDER: ICD-10-CM

## 2025-04-03 DIAGNOSIS — I25.10 CORONARY ARTERY DISEASE INVOLVING NATIVE CORONARY ARTERY OF NATIVE HEART WITHOUT ANGINA PECTORIS: ICD-10-CM

## 2025-04-03 DIAGNOSIS — G20.A1 PARKINSON'S DISEASE WITHOUT DYSKINESIA OR FLUCTUATING MANIFESTATIONS (HCC): ICD-10-CM

## 2025-04-03 DIAGNOSIS — N40.0 BENIGN PROSTATIC HYPERPLASIA WITHOUT LOWER URINARY TRACT SYMPTOMS: ICD-10-CM

## 2025-04-03 DIAGNOSIS — I50.21 ACUTE SYSTOLIC CONGESTIVE HEART FAILURE (HCC): ICD-10-CM

## 2025-04-03 DIAGNOSIS — S72.001A CLOSED RIGHT HIP FRACTURE, INITIAL ENCOUNTER (HCC): ICD-10-CM

## 2025-04-03 DIAGNOSIS — Z95.810 CARDIAC DEFIBRILLATOR IN SITU: ICD-10-CM

## 2025-04-03 DIAGNOSIS — E78.00 PURE HYPERCHOLESTEROLEMIA: ICD-10-CM

## 2025-04-03 DIAGNOSIS — I47.29 NSVT (NONSUSTAINED VENTRICULAR TACHYCARDIA) (HCC): ICD-10-CM

## 2025-04-03 DIAGNOSIS — I50.22 CHRONIC SYSTOLIC (CONGESTIVE) HEART FAILURE: ICD-10-CM

## 2025-04-03 DIAGNOSIS — I10 PRIMARY HYPERTENSION: ICD-10-CM

## 2025-04-03 DIAGNOSIS — R06.02 SHORTNESS OF BREATH: Primary | ICD-10-CM

## 2025-04-03 PROCEDURE — 1159F MED LIST DOCD IN RCRD: CPT | Performed by: NURSE PRACTITIONER

## 2025-04-03 PROCEDURE — 99214 OFFICE O/P EST MOD 30 MIN: CPT | Performed by: NURSE PRACTITIONER

## 2025-04-03 PROCEDURE — 1123F ACP DISCUSS/DSCN MKR DOCD: CPT | Performed by: NURSE PRACTITIONER

## 2025-04-03 PROCEDURE — 1160F RVW MEDS BY RX/DR IN RCRD: CPT | Performed by: NURSE PRACTITIONER

## 2025-04-03 RX ORDER — ACETAMINOPHEN 500 MG
500 TABLET ORAL EVERY 6 HOURS PRN
COMMUNITY

## 2025-04-03 RX ORDER — POLYETHYLENE GLYCOL 3350 17 G/17G
17 POWDER, FOR SOLUTION ORAL DAILY PRN
COMMUNITY

## 2025-04-03 RX ORDER — OXYCODONE HYDROCHLORIDE 5 MG/1
5 CAPSULE ORAL EVERY 4 HOURS PRN
COMMUNITY

## 2025-04-03 RX ORDER — MINERAL OIL/HYDROPHIL PETROLAT
OINTMENT (GRAM) TOPICAL PRN
COMMUNITY

## 2025-04-03 RX ORDER — MIDODRINE HYDROCHLORIDE 5 MG/1
5 TABLET ORAL 2 TIMES DAILY
COMMUNITY

## 2025-04-03 NOTE — PROGRESS NOTES
[facility]     Outpatient Follow Up Note  Nhung Casper RN, APRN,CNP     CHIEF COMPLAINT / HPI:  Follow Up     Harjinder Mcgee Jr is 89 y.o. male who presents today with CAD sp CABG / ICM / AICD 2023   Fall right hip surgery  in rehab and here with son   Interval history:  VSS wt down 12#, he appears euvolemic  he had recent fall  right hip pinning   No c/o cp SOB edema TOM       Past Medical History:   Diagnosis Date    Arthritis     Balance disorder     CAD (coronary artery disease)     Chest pain     Dental disease     Dizziness     Dyslipidemia     Fatigue     Hard of hearing     History of prostate cancer     Hypertension     Hyperthyroidism     Impaired memory     SOB (shortness of breath)     Vitamin D deficiency      Social History:    Social History     Tobacco Use   Smoking Status Former    Current packs/day: 0.00    Types: Cigarettes    Quit date: 1975    Years since quittin.2   Smokeless Tobacco Never     Current Medications:  Current Outpatient Medications   Medication Sig Dispense Refill    midodrine (PROAMATINE) 5 MG tablet Take 1 tablet by mouth in the morning and at bedtime      acetaminophen (TYLENOL) 500 MG tablet Take 1 tablet by mouth every 6 hours as needed for Pain      mineral oil-hydrophilic petrolatum (AQUAPHOR) ointment Apply topically as needed for Dry Skin Apply topically as needed.      oxyCODONE 5 MG capsule Take 1 capsule by mouth every 4 hours as needed for Pain. Max Daily Amount: 30 mg      polyethylene glycol (GLYCOLAX) 17 g packet Take 1 packet by mouth daily as needed for Constipation      metoprolol succinate (TOPROL XL) 25 MG extended release tablet Take 0.5 tablets by mouth daily 30 tablet 3    senna (SENOKOT) 8.8 MG/5ML SYRP syrup Take 5 mLs by mouth 2 times daily as needed (constipation) 105 mL 0    sacubitril-valsartan (ENTRESTO) 24-26 MG per tablet TAKE 1/2 TABLET BY MOUTH TWICE DAILY 30 tablet 5    atorvastatin (LIPITOR) 40 MG tablet Take 1

## 2025-04-15 PROCEDURE — 93297 REM INTERROG DEV EVAL ICPMS: CPT | Performed by: NURSE PRACTITIONER

## 2025-04-17 ENCOUNTER — TELEPHONE (OUTPATIENT)
Dept: INTERNAL MEDICINE CLINIC | Age: 89
End: 2025-04-17

## 2025-04-17 NOTE — TELEPHONE ENCOUNTER
Nurse wanted provider to know starting home health on patient and went to see patient today and someone will be coming next week for occupational therapy. Please advise.

## 2025-04-29 ENCOUNTER — OFFICE VISIT (OUTPATIENT)
Dept: INTERNAL MEDICINE CLINIC | Age: 89
End: 2025-04-29

## 2025-04-29 VITALS
WEIGHT: 140 LBS | HEART RATE: 72 BPM | BODY MASS INDEX: 17.97 KG/M2 | SYSTOLIC BLOOD PRESSURE: 93 MMHG | OXYGEN SATURATION: 98 % | DIASTOLIC BLOOD PRESSURE: 59 MMHG

## 2025-04-29 DIAGNOSIS — H61.23 EXCESSIVE CERUMEN IN BOTH EAR CANALS: Primary | ICD-10-CM

## 2025-04-29 DIAGNOSIS — H91.93 BILATERAL HEARING LOSS, UNSPECIFIED HEARING LOSS TYPE: ICD-10-CM

## 2025-04-29 RX ORDER — TAMSULOSIN HYDROCHLORIDE 0.4 MG/1
0.4 CAPSULE ORAL DAILY
Qty: 90 CAPSULE | Refills: 3 | Status: CANCELLED | OUTPATIENT
Start: 2025-04-29

## 2025-04-29 RX ORDER — SACUBITRIL AND VALSARTAN 24; 26 MG/1; MG/1
TABLET, FILM COATED ORAL
Qty: 30 TABLET | Refills: 5 | Status: CANCELLED | OUTPATIENT
Start: 2025-04-29

## 2025-04-29 RX ORDER — OXYBUTYNIN CHLORIDE 5 MG/1
5 TABLET, EXTENDED RELEASE ORAL DAILY
Qty: 90 TABLET | Refills: 3 | Status: CANCELLED | OUTPATIENT
Start: 2025-04-29

## 2025-04-29 RX ORDER — ATORVASTATIN CALCIUM 40 MG/1
40 TABLET, FILM COATED ORAL DAILY
Qty: 90 TABLET | Refills: 3 | Status: CANCELLED | OUTPATIENT
Start: 2025-04-29

## 2025-04-29 RX ORDER — SPIRONOLACTONE 25 MG/1
12.5 TABLET ORAL DAILY
Qty: 30 TABLET | Refills: 3 | Status: CANCELLED | OUTPATIENT
Start: 2025-04-29

## 2025-04-29 ASSESSMENT — PATIENT HEALTH QUESTIONNAIRE - PHQ9
SUM OF ALL RESPONSES TO PHQ QUESTIONS 1-9: 8
SUM OF ALL RESPONSES TO PHQ QUESTIONS 1-9: 8
8. MOVING OR SPEAKING SO SLOWLY THAT OTHER PEOPLE COULD HAVE NOTICED. OR THE OPPOSITE, BEING SO FIGETY OR RESTLESS THAT YOU HAVE BEEN MOVING AROUND A LOT MORE THAN USUAL: MORE THAN HALF THE DAYS
10. IF YOU CHECKED OFF ANY PROBLEMS, HOW DIFFICULT HAVE THESE PROBLEMS MADE IT FOR YOU TO DO YOUR WORK, TAKE CARE OF THINGS AT HOME, OR GET ALONG WITH OTHER PEOPLE: NOT DIFFICULT AT ALL
9. THOUGHTS THAT YOU WOULD BE BETTER OFF DEAD, OR OF HURTING YOURSELF: NOT AT ALL
SUM OF ALL RESPONSES TO PHQ QUESTIONS 1-9: 8
4. FEELING TIRED OR HAVING LITTLE ENERGY: MORE THAN HALF THE DAYS
2. FEELING DOWN, DEPRESSED OR HOPELESS: MORE THAN HALF THE DAYS
6. FEELING BAD ABOUT YOURSELF - OR THAT YOU ARE A FAILURE OR HAVE LET YOURSELF OR YOUR FAMILY DOWN: NOT AT ALL
1. LITTLE INTEREST OR PLEASURE IN DOING THINGS: MORE THAN HALF THE DAYS
SUM OF ALL RESPONSES TO PHQ QUESTIONS 1-9: 8
3. TROUBLE FALLING OR STAYING ASLEEP: NOT AT ALL
7. TROUBLE CONCENTRATING ON THINGS, SUCH AS READING THE NEWSPAPER OR WATCHING TELEVISION: NOT AT ALL
5. POOR APPETITE OR OVEREATING: NOT AT ALL

## 2025-04-29 NOTE — PROGRESS NOTES
Harjinder Mcgee Jr (:  1935) is a 89 y.o. male,Established patient, here for evaluation of the following chief complaint(s):  Hearing Problem      Assessment & Plan  Excessive cerumen in both ear canals   Acute condition, new, eardrops if needed, discussed safe way to clean out ears    Orders:    carbamide peroxide (DEBROX) 6.5 % otic solution; Place 5 drops into both ears 2 times daily    Bilateral hearing loss, unspecified hearing loss type   Chronic, not at goal (unstable), okay to wear hearing aids           Return if symptoms worsen or fail to improve.    Subjective       HPI  Presents to the office with his son.  Complaining of wax buildup in both ears.  Recently received hearing aids from [employer] but wanted to have the ears checked before putting them into make sure he did not have any impacted wax.      Review of Systems   Constitutional: Negative.    HENT:  Positive for hearing loss. Negative for ear discharge and ear pain.    Respiratory: Negative.     Cardiovascular: Negative.    Gastrointestinal: Negative.    Genitourinary: Negative.    Musculoskeletal:         Right hip fracture several months ago.  Was in rehab for several weeks following surgery.   Skin: Negative.    Neurological:         Dementia-son spoke for patient during appointment               Objective     BP (!) 93/59   Pulse 72   Wt 63.5 kg (140 lb)   SpO2 98%   BMI 17.97 kg/m²      Physical Exam  Constitutional:       General: He is not in acute distress.     Appearance: Normal appearance. He is not ill-appearing, toxic-appearing or diaphoretic.   HENT:      Head: Normocephalic.      Right Ear: Tympanic membrane, ear canal and external ear normal. There is no impacted cerumen.      Left Ear: Tympanic membrane, ear canal and external ear normal. There is no impacted cerumen.   Cardiovascular:      Rate and Rhythm: Normal rate and regular rhythm.      Pulses: Normal pulses.      Heart sounds: Normal heart sounds. No murmur

## 2025-04-30 ASSESSMENT — ENCOUNTER SYMPTOMS
RESPIRATORY NEGATIVE: 1
GASTROINTESTINAL NEGATIVE: 1

## 2025-05-07 ENCOUNTER — OFFICE VISIT (OUTPATIENT)
Dept: INTERNAL MEDICINE CLINIC | Age: 89
End: 2025-05-07
Payer: MEDICARE

## 2025-05-07 VITALS
DIASTOLIC BLOOD PRESSURE: 54 MMHG | BODY MASS INDEX: 17.97 KG/M2 | WEIGHT: 140 LBS | HEART RATE: 84 BPM | OXYGEN SATURATION: 93 % | SYSTOLIC BLOOD PRESSURE: 83 MMHG

## 2025-05-07 DIAGNOSIS — I10 PRIMARY HYPERTENSION: ICD-10-CM

## 2025-05-07 DIAGNOSIS — F02.B0 MODERATE DEMENTIA DUE TO PARKINSON'S DISEASE, WITHOUT BEHAVIORAL DISTURBANCE, PSYCHOTIC DISTURBANCE, MOOD DISTURBANCE, OR ANXIETY (HCC): ICD-10-CM

## 2025-05-07 DIAGNOSIS — S72.001S CLOSED FRACTURE OF RIGHT HIP, SEQUELA: ICD-10-CM

## 2025-05-07 DIAGNOSIS — G20.A1 MODERATE DEMENTIA DUE TO PARKINSON'S DISEASE, WITHOUT BEHAVIORAL DISTURBANCE, PSYCHOTIC DISTURBANCE, MOOD DISTURBANCE, OR ANXIETY (HCC): ICD-10-CM

## 2025-05-07 DIAGNOSIS — H61.23 BILATERAL IMPACTED CERUMEN: ICD-10-CM

## 2025-05-07 DIAGNOSIS — I25.5 ISCHEMIC CARDIOMYOPATHY: ICD-10-CM

## 2025-05-07 DIAGNOSIS — H91.93 BILATERAL HEARING LOSS, UNSPECIFIED HEARING LOSS TYPE: ICD-10-CM

## 2025-05-07 DIAGNOSIS — I95.1 ORTHOSTATIC HYPOTENSION: ICD-10-CM

## 2025-05-07 DIAGNOSIS — N40.1 BENIGN PROSTATIC HYPERPLASIA WITH LOWER URINARY TRACT SYMPTOMS, SYMPTOM DETAILS UNSPECIFIED: ICD-10-CM

## 2025-05-07 PROCEDURE — 1123F ACP DISCUSS/DSCN MKR DOCD: CPT | Performed by: INTERNAL MEDICINE

## 2025-05-07 PROCEDURE — 1159F MED LIST DOCD IN RCRD: CPT | Performed by: INTERNAL MEDICINE

## 2025-05-07 PROCEDURE — G2211 COMPLEX E/M VISIT ADD ON: HCPCS | Performed by: INTERNAL MEDICINE

## 2025-05-07 PROCEDURE — 99214 OFFICE O/P EST MOD 30 MIN: CPT | Performed by: INTERNAL MEDICINE

## 2025-05-07 NOTE — PROGRESS NOTES
Patient: Harjinder Mcgee Jr is a 89 y.o. male who presents today with the following Chief Complaint(s):    Chief Complaint   Patient presents with    Follow-up    Hypertension         HPIentresto, meto, spiron all cut 1/2  In rehab after hip fx.  Midodrine for orthostatic   PT comes to house.   Uses walker.   Getting better.   Hearing aid. Got otc aids. Otc from [employer]. [employer] [id number].   Mind is strong.   Cracks jokes, etc.   Old memory good.   Current Outpatient Medications   Medication Sig Dispense Refill    carbamide peroxide (DEBROX) 6.5 % otic solution Place 5 drops into both ears 2 times daily 15 mL 1    midodrine (PROAMATINE) 5 MG tablet Take 1 tablet by mouth in the morning and at bedtime      acetaminophen (TYLENOL) 500 MG tablet Take 1 tablet by mouth every 6 hours as needed for Pain      mineral oil-hydrophilic petrolatum (AQUAPHOR) ointment Apply topically as needed for Dry Skin Apply topically as needed.      oxyCODONE 5 MG capsule Take 1 capsule by mouth every 4 hours as needed for Pain.      polyethylene glycol (GLYCOLAX) 17 g packet Take 1 packet by mouth daily as needed for Constipation      metoprolol succinate (TOPROL XL) 25 MG extended release tablet Take 0.5 tablets by mouth daily 30 tablet 3    senna (SENOKOT) 8.8 MG/5ML SYRP syrup Take 5 mLs by mouth 2 times daily as needed (constipation) 105 mL 0    sacubitril-valsartan (ENTRESTO) 24-26 MG per tablet TAKE 1/2 TABLET BY MOUTH TWICE DAILY 30 tablet 5    atorvastatin (LIPITOR) 40 MG tablet Take 1 tablet by mouth daily for cholesterol. 90 tablet 3    furosemide (LASIX) 40 MG tablet TAKE 1 TABLET BY MOUTH DAILY AS NEEDED FOR LOWER EXTREMITY SWELLING 90 tablet 1    spironolactone (ALDACTONE) 25 MG tablet TAKE ONE-HALF TABLET BY MOUTH DAILY 30 tablet 3    tamsulosin (FLOMAX) 0.4 MG capsule TAKE 1 CAPSULE BY MOUTH DAILY 90 capsule 3    oxyBUTYnin (DITROPAN-XL) 5 MG extended release tablet TAKE 1 TABLET BY MOUTH DAILY 90 tablet 3    aspirin 81 MG chewable

## 2025-05-07 NOTE — PATIENT INSTRUCTIONS
Omron b/p device.     www.validatebp.org. to see if b/p device is certified.     Initially check b/p before dosing and in between.     Get blood test in 2 months.

## 2025-05-22 ENCOUNTER — TELEPHONE (OUTPATIENT)
Dept: ORTHOPEDIC SURGERY | Age: 89
End: 2025-05-22

## 2025-05-22 NOTE — TELEPHONE ENCOUNTER
----- Message from Ayesha JOHNSON sent at 5/22/2025 10:42 AM EDT -----  Regarding: Specialty Message to Provider  Specialty Message to Provider    Relationship to Patient: Third Party [facility]     Patient Message: WANTS TO ADVISE CLINIC THAT PATIENT HAD A FALL. NOT SURE IF PATIENT SHOULD BE SEEN. PLEASE CALL TO ADVISE  --------------------------------------------------------------------------------------------------------------------------    Call Back Information: OK to leave message on voicemail  Preferred Call Back Number: Dru LAKHANI, PT, [phone]

## 2025-05-22 NOTE — TELEPHONE ENCOUNTER
----- Message from Surendra ROMERO sent at 5/22/2025 11:16 AM EDT -----  Regarding: Specialist Appointment Request - Established  Specialist Appointment Request - Established    What Specialty? Select Speciality: Orthopedics     Type of Appointment: Appointment Type: Follow-Up    Reason for appointment?  RT HIP / FALL     Scheduling Preference per Patient: Date, Time, Provider: 5/23/25 ANYTIME AFTER 1:30 PM       Additional Information: PATIENT SON TYRON CALLING TO SEE IF PATIENT CAN GET A LATER TIME TOMORROW , ANY TIME AFTER 1:30 PM     PLEASE ADVISE   --------------------------------------------------------------------------------------------------------------------------    Relationship to Patient: Other/ SON TYRON    Call Back Information: OK to leave message on voicemail  Preferred Call Back Number: [phone]

## 2025-05-23 ENCOUNTER — OFFICE VISIT (OUTPATIENT)
Dept: ORTHOPEDIC SURGERY | Age: 89
End: 2025-05-23

## 2025-05-23 VITALS — BODY MASS INDEX: 17.97 KG/M2 | WEIGHT: 140 LBS | HEIGHT: 74 IN

## 2025-05-23 DIAGNOSIS — Z96.649 S/P HIP HEMIARTHROPLASTY: Primary | ICD-10-CM

## 2025-05-23 NOTE — PROGRESS NOTES
Dr Claude Menchaca      Date /Time 5/23/2025       12:21 PM EDT  Name Harjinder Mcgee Jr             1935   [facility]  [facility]  MRN [id number]                Chief Complaint   Patient presents with    Follow-up      CK Right Hemiarthroplasty SX 02/28/25 (Fall)        History of Present Illness      Harjinder Mcgee Jr is a 89 y.o. male is here for post-op visit after RIGHT  Hip hemiarthroplasty anterior approach      Patient presents the office today for postoperative visit for above-mentioned surgery.  Patient doing well.  Patient denies any fever, chills, or drainage.  Pain controlled.    He fell again yesterday as well as a week ago.  No new symptoms other than some pain.  Still having trouble with balance and muscle control.    Physical Exam    Based off 1997 Exam Criteria    Ht 1.88 m (6' 2.02\")   Wt 63.5 kg (140 lb)   BMI 17.97 kg/m²      Constitutional:       General: He is not in acute distress.     Appearance: Normal appearance.     RIGHT Hip: incision clean, intact, healing appropriately. No surrounding  erythema or fluctuance. Neuro intact distal. No evidence of DVT.      Able to stand up but weak.    Imaging       Right Hip: [facility]  Radiographs: AP pelvis, lateral, and false profile were ordered today and reviewed.  They demonstrate a hip hemiarthroplasty in good position.  No evidence of loosening or periprosthetic fracture.      Assessment and Plan    Harjinder was seen today for follow-up.    Diagnoses and all orders for this visit:    S/P hip hemiarthroplasty  -     XR HIP RIGHT (2-3 VIEWS); Future        Patient doing well.  He can continue to be weightbearing as tolerated.  Okay to use walker and ambulate.  Glute training and home exercises recommended.  Return back at 1 year visit.    Electronically signed by Claude Menchaca MD on 5/23/2025 at 3:56 PM  This dictation was generated by voice recognition computer software.  Although all attempts are made to edit the dictation

## 2025-05-26 ASSESSMENT — ENCOUNTER SYMPTOMS
EYES NEGATIVE: 1
GASTROINTESTINAL NEGATIVE: 1
RESPIRATORY NEGATIVE: 1

## 2025-05-26 NOTE — PROGRESS NOTES
Patient: Harjinder Mcgee Jr is a 89 y.o. male who presents today with the following Chief Complaint(s):    Chief Complaint   Patient presents with    Follow-up    Hypertension         HPIHe is here for a checkup. Recovering from right hip fracture sustained during an apparent mechanical fall. Spent time in rehab. PT now comes to the home. Recovering well.  Uses walker.   Denies CP or SOB. H/O cardiomyopathy, ischemic with HFrEF. Treated with Blker  Entresto, aldactone doses of which have been cut in half with midodrine added for orthostatic hypotension. He has an ICD. ECHO, LVEF 15 to 20 %. Cardiology f/u.         He suffers from mild-moderate dementia. Mind is strong. Cracks jokes an tells stories about the old days. Old memory largely intact.  Family takes care of executive functions. Home support system consist of daughter who stays in the home. Wife is home but is challenged from prior stroke. Grandson there also. Son who accompanies him today visits home routinely. Socialization is good.  Son notes he is close to baseline that he was before fall. Alertness and overall function. Sons number is [phone].   Hearing is an issue. Purchased OTC hearing aids thru [employer]. [id number]. Hearing improved with device.              Current Outpatient Medications   Medication Sig Dispense Refill    carbamide peroxide (DEBROX) 6.5 % otic solution Place 5 drops into both ears 2 times daily 15 mL 1    midodrine (PROAMATINE) 5 MG tablet Take 1 tablet by mouth in the morning and at bedtime      acetaminophen (TYLENOL) 500 MG tablet Take 1 tablet by mouth every 6 hours as needed for Pain      mineral oil-hydrophilic petrolatum (AQUAPHOR) ointment Apply topically as needed for Dry Skin Apply topically as needed.      oxyCODONE 5 MG capsule Take 1 capsule by mouth every 4 hours as needed for Pain.      polyethylene glycol (GLYCOLAX) 17 g packet Take 1 packet by mouth daily as needed for Constipation      metoprolol succinate

## 2025-05-30 ENCOUNTER — OFFICE VISIT (OUTPATIENT)
Dept: ENT CLINIC | Age: 89
End: 2025-05-30

## 2025-05-30 VITALS
HEIGHT: 74 IN | DIASTOLIC BLOOD PRESSURE: 77 MMHG | WEIGHT: 140 LBS | SYSTOLIC BLOOD PRESSURE: 130 MMHG | BODY MASS INDEX: 17.97 KG/M2 | RESPIRATION RATE: 16 BRPM | HEART RATE: 85 BPM | TEMPERATURE: 96.9 F

## 2025-05-30 DIAGNOSIS — H61.23 BILATERAL IMPACTED CERUMEN: Primary | ICD-10-CM

## 2025-05-30 NOTE — PROGRESS NOTES
ICMP, echo (2022) EF 15-20%, on GDMT, echo (2/2023) EF 30-35%, s/p single ch MDT ICD (4/2023, Dr. Arenas), NSVT on device, s/p fall w/ R hip fx w/ ORIF (2/2025).    ICMP/chronic sCHF  - BLE edema  - EF 30 to 35% (2/2023)  - NYHA class II/III  -   - On Toprol-XL 12.5 mg QD, Entresto 24-26 mg 1/2 tablet BID, spironolactone 12.5 mg QD, midodrine 5 mg BID, furosemide 40 mg QD  - hold Entresto first for a SBP < 110 mmHg  - S/p single ch MDT ICD (4/2023)  - Device check today shows less than 0.1% , no arrhythmias, other parameters stable.  - Will check an echo  - Low Na+ diet  - Extra 20 mg furosemide prn weight gain, edema  - Elevate legs while sitting  - BLE knee high support stockings  - F/u w/ gen cards in 3 months  - F/u w/ EP in 6 months  - ECG ordered and results personally reviewed     CAD  - No s/s  - S/p CABG (2009)  - On ASA 81 mg QD, atorvastatin 40 mg QD    HTN  - BP on the low side  - Hold parameters given to son  - On midodrine 5 mg BID  - On Entresto, Toprol, furosemide, spironolactone    All questions and concerns were addressed to the patient/family. Alternatives to my treatment were discussed. The note was completed using EMR. Every effort was made to ensure accuracy; however, inadvertent computerized transcription errors may be present.     Patient received education regarding their diagnosis, treatment and medications while in the office today.      French Schroeder [facility]    I  have spent 43 minutes in care of the patient including direct face to face time, chart preparation, reviewing diagnostic testing, other provider notes and coordinating patient care.

## 2025-05-30 NOTE — PROGRESS NOTES
for cholesterol. 90 tablet 3    furosemide (LASIX) 40 MG tablet TAKE 1 TABLET BY MOUTH DAILY AS NEEDED FOR LOWER EXTREMITY SWELLING 90 tablet 1    spironolactone (ALDACTONE) 25 MG tablet TAKE ONE-HALF TABLET BY MOUTH DAILY 30 tablet 3    tamsulosin (FLOMAX) 0.4 MG capsule TAKE 1 CAPSULE BY MOUTH DAILY 90 capsule 3    oxyBUTYnin (DITROPAN-XL) 5 MG extended release tablet TAKE 1 TABLET BY MOUTH DAILY 90 tablet 3    aspirin 81 MG chewable tablet Take 1 tablet by mouth daily 30 tablet 3     No current facility-administered medications for this visit.       Review of Systems     Review of Systems      PhysicalExam     Vitals:    05/30/25 0937   BP: 130/77   Pulse: 85   Resp: 16   Temp: 96.9 °F (36.1 °C)       Physical Exam      Procedure     Removal Impacted Cerumen    An operating microscope was utilized to visualize the external auditory canals using a 3mm speculum.  Cerumen was removed with curettes and slaughter suctions on both sides.  The tympanic membrane is intact.  No fluid visualized in the middle ear. No complications.        Assessment and Plan     1. Bilateral impacted cerumen  Bilateral cerumen removed with instruments.  Follow-up as needed for cleaning.      Return if symptoms worsen or fail to improve.      [ ] Review/order radiology tests   [ ] Independent interpretation of diagnostic test by another provider  [ ] Discussed case with another provider  [ ] High risk of loss of major body function  [ ] Elective major surgery with risk factors    Portions of this note were dictated using Dragon. There may be linguistic errors secondary to the use of this program.

## 2025-06-16 ENCOUNTER — TELEPHONE (OUTPATIENT)
Dept: INTERNAL MEDICINE CLINIC | Age: 89
End: 2025-06-16

## 2025-06-16 DIAGNOSIS — E78.00 PURE HYPERCHOLESTEROLEMIA: ICD-10-CM

## 2025-06-16 DIAGNOSIS — I95.1 ORTHOSTATIC HYPOTENSION: ICD-10-CM

## 2025-06-16 DIAGNOSIS — I10 PRIMARY HYPERTENSION: Primary | ICD-10-CM

## 2025-06-16 RX ORDER — SPIRONOLACTONE 25 MG/1
12.5 TABLET ORAL DAILY
Qty: 30 TABLET | Refills: 3 | Status: SHIPPED | OUTPATIENT
Start: 2025-06-16

## 2025-06-16 RX ORDER — ATORVASTATIN CALCIUM 40 MG/1
40 TABLET, FILM COATED ORAL DAILY
Qty: 90 TABLET | Refills: 3 | Status: SHIPPED | OUTPATIENT
Start: 2025-06-16

## 2025-06-16 RX ORDER — MIDODRINE HYDROCHLORIDE 5 MG/1
5 TABLET ORAL 2 TIMES DAILY
Qty: 60 TABLET | Refills: 1 | Status: SHIPPED | OUTPATIENT
Start: 2025-06-16

## 2025-06-16 NOTE — TELEPHONE ENCOUNTER
Medication:   Requested Prescriptions     Pending Prescriptions Disp Refills    atorvastatin (LIPITOR) 40 MG tablet 90 tablet 3     Sig: Take 1 tablet by mouth daily for cholesterol.    midodrine (PROAMATINE) 5 MG tablet 90 tablet      Sig: Take 1 tablet by mouth in the morning and at bedtime    spironolactone (ALDACTONE) 25 MG tablet 30 tablet 3     Sig: Take 0.5 tablets by mouth daily        Last Filled:      Patient Phone Number: [phone] (home)     Last appt: 5/7/2025   Next appt: 7/17/2025    Last OARRS:        No data to display

## 2025-06-16 NOTE — TELEPHONE ENCOUNTER
Pt needs a PA for the following medications  He had an Emergency refill but is now all out.   -Midodrine  -Spironolactone   -Atorvastatin   Please Advise

## 2025-06-17 ENCOUNTER — TELEPHONE (OUTPATIENT)
Dept: ADMINISTRATIVE | Age: 89
End: 2025-06-17

## 2025-06-17 NOTE — TELEPHONE ENCOUNTER
Submitted PA for Midodrine HCl 5MG tablets  Via [facility]: [id number] STATUS: The patient currently has access to the requested medication and a Prior Authorization is not needed for the patient/medication.     PAs for Spironolactone and Atorvastatin are in 6/17/25 telephone encounters.

## 2025-06-25 ENCOUNTER — OFFICE VISIT (OUTPATIENT)
Dept: CARDIOLOGY CLINIC | Age: 89
End: 2025-06-25
Payer: MEDICARE

## 2025-06-25 ENCOUNTER — CLINICAL SUPPORT (OUTPATIENT)
Dept: CARDIOLOGY CLINIC | Age: 89
End: 2025-06-25

## 2025-06-25 VITALS
DIASTOLIC BLOOD PRESSURE: 60 MMHG | HEART RATE: 76 BPM | WEIGHT: 146.4 LBS | SYSTOLIC BLOOD PRESSURE: 90 MMHG | BODY MASS INDEX: 18.79 KG/M2

## 2025-06-25 DIAGNOSIS — I47.29 NSVT (NONSUSTAINED VENTRICULAR TACHYCARDIA) (HCC): ICD-10-CM

## 2025-06-25 DIAGNOSIS — I25.10 CORONARY ARTERY DISEASE INVOLVING NATIVE CORONARY ARTERY OF NATIVE HEART WITHOUT ANGINA PECTORIS: ICD-10-CM

## 2025-06-25 DIAGNOSIS — I25.5 ISCHEMIC CARDIOMYOPATHY: Primary | ICD-10-CM

## 2025-06-25 DIAGNOSIS — Z95.810 ICD (IMPLANTABLE CARDIOVERTER-DEFIBRILLATOR), SINGLE, IN SITU: ICD-10-CM

## 2025-06-25 DIAGNOSIS — I50.22 CHRONIC SYSTOLIC CONGESTIVE HEART FAILURE (HCC): ICD-10-CM

## 2025-06-25 DIAGNOSIS — I42.9 CARDIOMYOPATHY, UNSPECIFIED TYPE (HCC): ICD-10-CM

## 2025-06-25 DIAGNOSIS — I50.22 CHRONIC SYSTOLIC CHF (CONGESTIVE HEART FAILURE) (HCC): ICD-10-CM

## 2025-06-25 DIAGNOSIS — I10 BENIGN ESSENTIAL HTN: ICD-10-CM

## 2025-06-25 PROCEDURE — 1160F RVW MEDS BY RX/DR IN RCRD: CPT | Performed by: NURSE PRACTITIONER

## 2025-06-25 PROCEDURE — 99215 OFFICE O/P EST HI 40 MIN: CPT | Performed by: NURSE PRACTITIONER

## 2025-06-25 PROCEDURE — 1159F MED LIST DOCD IN RCRD: CPT | Performed by: NURSE PRACTITIONER

## 2025-06-25 PROCEDURE — 1123F ACP DISCUSS/DSCN MKR DOCD: CPT | Performed by: NURSE PRACTITIONER

## 2025-06-25 PROCEDURE — 93000 ELECTROCARDIOGRAM COMPLETE: CPT | Performed by: NURSE PRACTITIONER

## 2025-06-25 RX ORDER — FUROSEMIDE 40 MG/1
40 TABLET ORAL DAILY
Qty: 90 TABLET | Refills: 3 | Status: SHIPPED | OUTPATIENT
Start: 2025-06-25

## 2025-06-27 DIAGNOSIS — N32.81 OAB (OVERACTIVE BLADDER): ICD-10-CM

## 2025-06-30 RX ORDER — TAMSULOSIN HYDROCHLORIDE 0.4 MG/1
0.4 CAPSULE ORAL DAILY
Qty: 30 CAPSULE | Refills: 3 | Status: SHIPPED | OUTPATIENT
Start: 2025-06-30

## 2025-06-30 RX ORDER — OXYBUTYNIN CHLORIDE 5 MG/1
5 TABLET, EXTENDED RELEASE ORAL DAILY
Qty: 30 TABLET | Refills: 3 | Status: SHIPPED | OUTPATIENT
Start: 2025-06-30

## 2025-06-30 NOTE — TELEPHONE ENCOUNTER
Medication:   Requested Prescriptions     Pending Prescriptions Disp Refills    oxyBUTYnin (DITROPAN-XL) 5 MG extended release tablet [Pharmacy Med Name: OXYBUTYNIN ER 5MG TABLETS] 30 tablet      Sig: TAKE 1 TABLET BY MOUTH ONCE DAILY    tamsulosin (FLOMAX) 0.4 MG capsule [Pharmacy Med Name: TAMSULOSIN 0.4MG CAPSULES] 30 capsule      Sig: TAKE 1 CAPSULE BY MOUTH ONCE DAILY        Last Filled:      Patient Phone Number: [phone] (home)     Last appt: 5/7/2025   Next appt: 7/17/2025    Last OARRS:        No data to display

## 2025-07-17 ENCOUNTER — OFFICE VISIT (OUTPATIENT)
Dept: INTERNAL MEDICINE CLINIC | Age: 89
End: 2025-07-17
Payer: MEDICARE

## 2025-07-17 VITALS
HEART RATE: 91 BPM | DIASTOLIC BLOOD PRESSURE: 60 MMHG | SYSTOLIC BLOOD PRESSURE: 102 MMHG | BODY MASS INDEX: 18.48 KG/M2 | OXYGEN SATURATION: 99 % | WEIGHT: 144 LBS

## 2025-07-17 DIAGNOSIS — N40.1 BENIGN PROSTATIC HYPERPLASIA WITH URINARY FREQUENCY: ICD-10-CM

## 2025-07-17 DIAGNOSIS — I95.1 ORTHOSTATIC HYPOTENSION: ICD-10-CM

## 2025-07-17 DIAGNOSIS — I10 PRIMARY HYPERTENSION: ICD-10-CM

## 2025-07-17 DIAGNOSIS — G20.A1 DEMENTIA ASSOCIATED WITH PARKINSON'S DISEASE (HCC): ICD-10-CM

## 2025-07-17 DIAGNOSIS — E78.2 MIXED HYPERLIPIDEMIA: ICD-10-CM

## 2025-07-17 DIAGNOSIS — Z87.81 HISTORY OF HIP FRACTURE: ICD-10-CM

## 2025-07-17 DIAGNOSIS — I42.8 OTHER CARDIOMYOPATHY (HCC): Primary | ICD-10-CM

## 2025-07-17 DIAGNOSIS — F02.80 DEMENTIA ASSOCIATED WITH PARKINSON'S DISEASE (HCC): ICD-10-CM

## 2025-07-17 DIAGNOSIS — R35.0 BENIGN PROSTATIC HYPERPLASIA WITH URINARY FREQUENCY: ICD-10-CM

## 2025-07-17 DIAGNOSIS — H90.3 SENSORINEURAL HEARING LOSS (SNHL) OF BOTH EARS: ICD-10-CM

## 2025-07-17 PROCEDURE — 1123F ACP DISCUSS/DSCN MKR DOCD: CPT | Performed by: INTERNAL MEDICINE

## 2025-07-17 PROCEDURE — 99214 OFFICE O/P EST MOD 30 MIN: CPT | Performed by: INTERNAL MEDICINE

## 2025-07-17 PROCEDURE — G2211 COMPLEX E/M VISIT ADD ON: HCPCS | Performed by: INTERNAL MEDICINE

## 2025-07-17 PROCEDURE — 1159F MED LIST DOCD IN RCRD: CPT | Performed by: INTERNAL MEDICINE

## 2025-07-17 RX ORDER — MIDODRINE HYDROCHLORIDE 5 MG/1
5 TABLET ORAL 2 TIMES DAILY
Qty: 60 TABLET | Refills: 5 | Status: SHIPPED | OUTPATIENT
Start: 2025-07-17

## 2025-07-26 ASSESSMENT — ENCOUNTER SYMPTOMS
GASTROINTESTINAL NEGATIVE: 1
EYES NEGATIVE: 1
RESPIRATORY NEGATIVE: 1

## 2025-07-26 NOTE — PROGRESS NOTES
Patient: Harjinder Mcgee Jr is a 89 y.o. male who presents today with the following Chief Complaint(s):    Chief Complaint   Patient presents with    Follow-up         HPIpeeing, balancing, hearing has aids. Forgetfulness.   No CP or SOB.   Some exertional sob with exercise. Seems stable. Gets therapy at home. Note hip surgery. Uses walker not wheelchair.   Not as active after hip, no driving and getting around. Doing some activty checking on yards. Still 2.   Anxiety with driving back at night.      Current Outpatient Medications   Medication Sig Dispense Refill    oxyBUTYnin (DITROPAN-XL) 5 MG extended release tablet TAKE 1 TABLET BY MOUTH ONCE DAILY 30 tablet 3    tamsulosin (FLOMAX) 0.4 MG capsule TAKE 1 CAPSULE BY MOUTH ONCE DAILY 30 capsule 3    furosemide (LASIX) 40 MG tablet Take 1 tablet by mouth daily 90 tablet 3    atorvastatin (LIPITOR) 40 MG tablet Take 1 tablet by mouth daily for cholesterol. 90 tablet 3    midodrine (PROAMATINE) 5 MG tablet Take 1 tablet by mouth in the morning and at bedtime 60 tablet 1    spironolactone (ALDACTONE) 25 MG tablet Take 0.5 tablets by mouth daily 30 tablet 3    carbamide peroxide (DEBROX) 6.5 % otic solution Place 5 drops into both ears 2 times daily 15 mL 1    acetaminophen (TYLENOL) 500 MG tablet Take 1 tablet by mouth every 6 hours as needed for Pain      mineral oil-hydrophilic petrolatum (AQUAPHOR) ointment Apply topically as needed for Dry Skin Apply topically as needed.      oxyCODONE 5 MG capsule Take 1 capsule by mouth every 4 hours as needed for Pain.      polyethylene glycol (GLYCOLAX) 17 g packet Take 1 packet by mouth daily as needed for Constipation      metoprolol succinate (TOPROL XL) 25 MG extended release tablet Take 0.5 tablets by mouth daily 30 tablet 3    senna (SENOKOT) 8.8 MG/5ML SYRP syrup Take 5 mLs by mouth 2 times daily as needed (constipation) 105 mL 0    sacubitril-valsartan (ENTRESTO) 24-26 MG per tablet TAKE 1/2 TABLET BY MOUTH TWICE DAILY 30 
   acetaminophen (TYLENOL) 500 MG tablet Take 1 tablet by mouth every 6 hours as needed for Pain      mineral oil-hydrophilic petrolatum (AQUAPHOR) ointment Apply topically as needed for Dry Skin Apply topically as needed.      oxyCODONE 5 MG capsule Take 1 capsule by mouth every 4 hours as needed for Pain.      polyethylene glycol (GLYCOLAX) 17 g packet Take 1 packet by mouth daily as needed for Constipation      metoprolol succinate (TOPROL XL) 25 MG extended release tablet Take 0.5 tablets by mouth daily 30 tablet 3    senna (SENOKOT) 8.8 MG/5ML SYRP syrup Take 5 mLs by mouth 2 times daily as needed (constipation) 105 mL 0    sacubitril-valsartan (ENTRESTO) 24-26 MG per tablet TAKE 1/2 TABLET BY MOUTH TWICE DAILY 30 tablet 5    aspirin 81 MG chewable tablet Take 1 tablet by mouth daily 30 tablet 3     No current facility-administered medications for this visit.       Patient's past medical history, surgical history, family history, medications,and allergies  were all reviewed and updated as appropriate today.      Review of Systems   Constitutional: Negative.    HENT:          Hearing, see HPI.    Eyes: Negative.    Respiratory: Negative.     Cardiovascular:         Cardiomyopathy. See HPI.     Hypertension, see med list.    Gastrointestinal: Negative.    Endocrine:        Hyperlipidemia, treated with statin. LDL 99.    Genitourinary: Negative.         BPH, takes tamsulosin.    Musculoskeletal:         Hip fracture, see HPI.   Skin: Negative.    Neurological:         H/O parkinson's. Motor skills seen stable.    Psychiatric/Behavioral:          Dementia, as prior outlined. No change.          Physical Exam  Constitutional:       General: He is not in acute distress.     Appearance: He is well-developed.   HENT:      Head: Normocephalic and atraumatic.      Right Ear: External ear normal.      Left Ear: External ear normal.      Nose: Nose normal.   Eyes:      General: No scleral icterus.     Conjunctiva/sclera:

## 2025-07-29 ENCOUNTER — HOSPITAL ENCOUNTER (OUTPATIENT)
Dept: NON INVASIVE DIAGNOSTICS | Age: 89
Discharge: HOME | End: 2025-07-29
Payer: MEDICARE

## 2025-07-29 ENCOUNTER — HOSPITAL ENCOUNTER (OUTPATIENT)
Age: 89
Discharge: HOME | End: 2025-07-31
Payer: MEDICARE

## 2025-07-29 DIAGNOSIS — I42.9 CARDIOMYOPATHY, UNSPECIFIED TYPE (HCC): ICD-10-CM

## 2025-07-29 DIAGNOSIS — I50.22 CHRONIC SYSTOLIC CONGESTIVE HEART FAILURE (HCC): ICD-10-CM

## 2025-07-29 LAB
ECHO AO ROOT DIAM: 2.8 CM
ECHO AV AREA PEAK VELOCITY: 1.2 CM2
ECHO AV PEAK GRADIENT: 16 MMHG
ECHO AV PEAK VELOCITY: 2 M/S
ECHO AV VELOCITY RATIO: 0.35
ECHO EST RA PRESSURE: 8 MMHG
ECHO IVC EXP: 2.3 CM
ECHO IVC INSP: 1 CM
ECHO LA AREA 2C: 16.8 CM2
ECHO LA AREA 4C: 17.7 CM2
ECHO LA MAJOR AXIS: 5.7 CM
ECHO LA MINOR AXIS: 5.3 CM
ECHO LA VOL BP: 44 ML (ref 18–58)
ECHO LA VOL MOD A2C: 43 ML (ref 18–58)
ECHO LA VOL MOD A4C: 43 ML (ref 18–58)
ECHO LV E' LATERAL VELOCITY: 5.33 CM/S
ECHO LV E' SEPTAL VELOCITY: 3.49 CM/S
ECHO LV EDV 3D: 172 ML
ECHO LV EF PHYSICIAN: 33 %
ECHO LV ESV 3D: 99 ML
ECHO LV FRACTIONAL SHORTENING: 21 % (ref 28–44)
ECHO LV INTERNAL DIMENSION DIASTOLIC: 4.8 CM (ref 4.2–5.9)
ECHO LV INTERNAL DIMENSION SYSTOLIC: 3.8 CM
ECHO LV IVSD: 0.8 CM (ref 0.6–1)
ECHO LV MASS 2D: 158.8 G (ref 88–224)
ECHO LV MASS 3D: 161 G
ECHO LV POSTERIOR WALL DIASTOLIC: 1.1 CM (ref 0.6–1)
ECHO LV RELATIVE WALL THICKNESS RATIO: 0.46
ECHO LVOT AREA: 3.5 CM2
ECHO LVOT DIAM: 2.1 CM
ECHO LVOT MEAN GRADIENT: 1 MMHG
ECHO LVOT PEAK GRADIENT: 2 MMHG
ECHO LVOT PEAK VELOCITY: 0.7 M/S
ECHO LVOT SV: 56.4 ML
ECHO LVOT VTI: 16.3 CM
ECHO MV A VELOCITY: 0.88 M/S
ECHO MV E DECELERATION TIME (DT): 209 MS
ECHO MV E VELOCITY: 0.74 M/S
ECHO MV E/A RATIO: 0.84
ECHO MV E/E' LATERAL: 13.88
ECHO MV E/E' RATIO (AVERAGED): 17.54
ECHO MV E/E' SEPTAL: 21.2
ECHO PV MAX VELOCITY: 0.6 M/S
ECHO PV PEAK GRADIENT: 1 MMHG
ECHO RA AREA 4C: 15.1 CM2
ECHO RA VOLUME: 38 ML
ECHO RV BASAL DIMENSION: 3.9 CM
ECHO RV FREE WALL PEAK S': 5.6 CM/S
ECHO RV MID DIMENSION: 2.9 CM
ECHO RV TAPSE: 1.2 CM (ref 1.7–?)

## 2025-07-29 PROCEDURE — 93306 TTE W/DOPPLER COMPLETE: CPT

## 2025-07-29 PROCEDURE — 93306 TTE W/DOPPLER COMPLETE: CPT | Performed by: INTERNAL MEDICINE

## 2025-07-29 PROCEDURE — 76376 3D RENDER W/INTRP POSTPROCES: CPT | Performed by: INTERNAL MEDICINE

## (undated) DEVICE — BLADE ES L6IN ELASTOMERIC COAT INSUL DURABLE BEND UPTO

## (undated) DEVICE — TOWEL,OR,DSP,ST,BLUE,DLX,8/PK,10PK/CS: Brand: MEDLINE

## (undated) DEVICE — SYRINGE IRRIG 60ML SFT PLIABLE BLB EZ TO GRP 1 HND USE W/

## (undated) DEVICE — TOTAL HIP: Brand: MEDLINE INDUSTRIES, INC.

## (undated) DEVICE — SUTURE ABSORBABLE MONOFILAMENT 1 CTX 36 CM 48 MM VIO PDS +

## (undated) DEVICE — 3M™ STERI-DRAPE™ U-DRAPE 1015: Brand: STERI-DRAPE™

## (undated) DEVICE — SST TWIST DRILL, STANDARD, 2.8MM DIA. X 127MM: Brand: MICROAIRE®

## (undated) DEVICE — TRAP FLUID

## (undated) DEVICE — NEPTUNE E-SEP SMOKE EVACUATION PENCIL, COATED, 70MM BLADE, PUSH BUTTON SWITCH: Brand: NEPTUNE E-SEP

## (undated) DEVICE — SUTURE MONOCRYL STRATAFIX SPRL SZ 3-0 L12IN ABSRB UD FS-1 L30X30CM SXMP2B410

## (undated) DEVICE — SOLUTION IRRIG 1000ML H2O PIC PLAS SHATTERPROOF CONTAINER

## (undated) DEVICE — BLANKET WRM W29.9XL79.1IN UP BODY FORC AIR MISTRAL-AIR

## (undated) DEVICE — TOWEL,STOP FLAG GOLD N-W: Brand: MEDLINE

## (undated) DEVICE — INTENDED TO AID IN THE PASSING OF SUTURES THROUGH BONE AND SOFT TISSUE DURING ORTHOPEDIC SURGERY: Brand: HOFFEE SUTURE RETRIEVER

## (undated) DEVICE — UNDERGLOVE SURG SZ 8 BLU LTX FREE SYN POLYISOPRENE POLYMER

## (undated) DEVICE — NEEDLE SUT SZ 3 MAYO 1 2 CIR TAPR PNT DISPOSABLE

## (undated) DEVICE — GLOVE ORTHO 7 1/2   MSG9475

## (undated) DEVICE — SUTURE STRATAFIX SYMMETRIC PDS + SZ 2-0 L18IN ABSRB VLT SXPP1A403

## (undated) DEVICE — SUTURE ETHIBOND EXCEL SZ 2 L30IN NONABSORBABLE GRN L40MM V-37 MX69G

## (undated) DEVICE — 1010 S-DRAPE TOWEL DRAPE 10/BX: Brand: STERI-DRAPE™

## (undated) DEVICE — BOWL AND CEMENT CARTRIDGE WITH BREAKAWAY FEMORAL NOZZLE AND MEDIUM PRESSURIZER: Brand: ACM

## (undated) DEVICE — BONE PREPARATION KIT: Brand: BIOPREP

## (undated) DEVICE — NEEDLE SPNL 20GA L3.5IN YEL HUB S STL REG WALL FIT STYL

## (undated) DEVICE — SUTURE N ABSRB BRAIDED 5-0 CTX 39 IN 48 MM WHT BLK XBRAID S 3910900052

## (undated) DEVICE — COVER LT HNDL BLU PLAS

## (undated) DEVICE — GOWN,SIRUS,POLYRNF,BRTHSLV,XL,30/CS: Brand: MEDLINE

## (undated) DEVICE — DUAL CUT SAGITTAL BLADE

## (undated) DEVICE — ADHESIVE SKIN CLOSURE WND 8.661X1.5 IN 22 CM LIQUIBAND SECUR

## (undated) DEVICE — BIPOLAR SEALER 23-112-1 AQM 6.0: Brand: AQUAMANTYS ®

## (undated) DEVICE — APPLICATOR MEDICATED 26 CC SOLUTION HI LT ORNG CHLORAPREP

## (undated) DEVICE — SYRINGE MED 30ML STD CLR PLAS LUERLOCK TIP N CTRL DISP

## (undated) DEVICE — ELECTRODE ELECSURG L 10.2 CM PTFE COAT MONOPOLAR BLADE OPN